# Patient Record
Sex: MALE | Race: WHITE | Employment: OTHER | URBAN - NONMETROPOLITAN AREA
[De-identification: names, ages, dates, MRNs, and addresses within clinical notes are randomized per-mention and may not be internally consistent; named-entity substitution may affect disease eponyms.]

---

## 2017-09-04 ENCOUNTER — HOSPITAL ENCOUNTER (OUTPATIENT)
Age: 62
Setting detail: OBSERVATION
Discharge: HOME | End: 2017-09-06
Attending: INTERNAL MEDICINE | Admitting: INTERNAL MEDICINE
Payer: MEDICARE

## 2017-09-04 DIAGNOSIS — R00.1 SINUS BRADYCARDIA: Primary | ICD-10-CM

## 2017-09-04 PROCEDURE — 1200000003 HC TELEMETRY R&B

## 2017-09-05 ENCOUNTER — APPOINTMENT (OUTPATIENT)
Dept: NON INVASIVE DIAGNOSTICS | Age: 62
End: 2017-09-05
Attending: INTERNAL MEDICINE
Payer: MEDICARE

## 2017-09-05 ENCOUNTER — APPOINTMENT (OUTPATIENT)
Dept: INTERVENTIONAL RADIOLOGY/VASCULAR | Age: 62
End: 2017-09-05
Attending: INTERNAL MEDICINE
Payer: MEDICARE

## 2017-09-05 LAB
ALBUMIN SERPL-MCNC: 3.5 G/DL (ref 3.5–5.1)
ALP BLD-CCNC: 67 U/L (ref 38–126)
ALT SERPL-CCNC: 8 U/L (ref 11–66)
ANION GAP SERPL CALCULATED.3IONS-SCNC: 12 MEQ/L (ref 8–16)
AST SERPL-CCNC: 14 U/L (ref 5–40)
AVERAGE GLUCOSE: 96 MG/DL (ref 70–126)
BILIRUB SERPL-MCNC: 0.3 MG/DL (ref 0.3–1.2)
BUN BLDV-MCNC: 11 MG/DL (ref 7–22)
CALCIUM SERPL-MCNC: 8.7 MG/DL (ref 8.5–10.5)
CHLORIDE BLD-SCNC: 105 MEQ/L (ref 98–111)
CO2: 24 MEQ/L (ref 23–33)
CREAT SERPL-MCNC: 0.7 MG/DL (ref 0.4–1.2)
CREAT SERPL-MCNC: 0.9 MG/DL (ref 0.4–1.2)
GFR SERPL CREATININE-BSD FRML MDRD: 86 ML/MIN/1.73M2
GFR SERPL CREATININE-BSD FRML MDRD: > 90 ML/MIN/1.73M2
GLUCOSE BLD-MCNC: 82 MG/DL (ref 70–108)
HBA1C MFR BLD: 5.2 % (ref 4.4–6.4)
HCT VFR BLD CALC: 44.7 % (ref 42–52)
HEMOGLOBIN: 15.1 GM/DL (ref 14–18)
MCH RBC QN AUTO: 30.5 PG (ref 27–31)
MCHC RBC AUTO-ENTMCNC: 33.8 GM/DL (ref 33–37)
MCV RBC AUTO: 90.2 FL (ref 80–94)
PDW BLD-RTO: 15.1 % (ref 11.5–14.5)
PLATELET # BLD: 236 THOU/MM3 (ref 130–400)
PMV BLD AUTO: 8.5 MCM (ref 7.4–10.4)
POTASSIUM SERPL-SCNC: 4.5 MEQ/L (ref 3.5–5.2)
RBC # BLD: 4.95 MILL/MM3 (ref 4.7–6.1)
SODIUM BLD-SCNC: 141 MEQ/L (ref 135–145)
TOTAL PROTEIN: 6.7 G/DL (ref 6.1–8)
TROPONIN T: < 0.01 NG/ML
WBC # BLD: 8.1 THOU/MM3 (ref 4.8–10.8)

## 2017-09-05 PROCEDURE — 78452 HT MUSCLE IMAGE SPECT MULT: CPT

## 2017-09-05 PROCEDURE — 86141 C-REACTIVE PROTEIN HS: CPT

## 2017-09-05 PROCEDURE — 2580000003 HC RX 258: Performed by: INTERNAL MEDICINE

## 2017-09-05 PROCEDURE — 85027 COMPLETE CBC AUTOMATED: CPT

## 2017-09-05 PROCEDURE — 36415 COLL VENOUS BLD VENIPUNCTURE: CPT

## 2017-09-05 PROCEDURE — 84484 ASSAY OF TROPONIN QUANT: CPT

## 2017-09-05 PROCEDURE — 83090 ASSAY OF HOMOCYSTEINE: CPT

## 2017-09-05 PROCEDURE — 6360000002 HC RX W HCPCS: Performed by: INTERNAL MEDICINE

## 2017-09-05 PROCEDURE — G0378 HOSPITAL OBSERVATION PER HR: HCPCS

## 2017-09-05 PROCEDURE — 99220 PR INITIAL OBSERVATION CARE/DAY 70 MINUTES: CPT | Performed by: INTERNAL MEDICINE

## 2017-09-05 PROCEDURE — 93880 EXTRACRANIAL BILAT STUDY: CPT

## 2017-09-05 PROCEDURE — 6360000002 HC RX W HCPCS

## 2017-09-05 PROCEDURE — 80053 COMPREHEN METABOLIC PANEL: CPT

## 2017-09-05 PROCEDURE — 3430000000 HC RX DIAGNOSTIC RADIOPHARMACEUTICAL: Performed by: INTERNAL MEDICINE

## 2017-09-05 PROCEDURE — A9500 TC99M SESTAMIBI: HCPCS | Performed by: INTERNAL MEDICINE

## 2017-09-05 PROCEDURE — 6370000000 HC RX 637 (ALT 250 FOR IP): Performed by: INTERNAL MEDICINE

## 2017-09-05 PROCEDURE — 82565 ASSAY OF CREATININE: CPT

## 2017-09-05 PROCEDURE — 83036 HEMOGLOBIN GLYCOSYLATED A1C: CPT

## 2017-09-05 PROCEDURE — 96372 THER/PROPH/DIAG INJ SC/IM: CPT

## 2017-09-05 PROCEDURE — 94640 AIRWAY INHALATION TREATMENT: CPT

## 2017-09-05 PROCEDURE — 93017 CV STRESS TEST TRACING ONLY: CPT | Performed by: INTERNAL MEDICINE

## 2017-09-05 RX ORDER — OMEPRAZOLE 10 MG/1
10 CAPSULE, DELAYED RELEASE ORAL DAILY
Status: ON HOLD | COMMUNITY
End: 2017-10-18 | Stop reason: DRUGHIGH

## 2017-09-05 RX ORDER — ASPIRIN 81 MG/1
81 TABLET, CHEWABLE ORAL DAILY
Status: DISCONTINUED | OUTPATIENT
Start: 2017-09-06 | End: 2017-09-06 | Stop reason: HOSPADM

## 2017-09-05 RX ORDER — TIZANIDINE 4 MG/1
4 TABLET ORAL 2 TIMES DAILY
Status: ON HOLD | COMMUNITY
End: 2017-09-06 | Stop reason: HOSPADM

## 2017-09-05 RX ORDER — SODIUM CHLORIDE 0.9 % (FLUSH) 0.9 %
10 SYRINGE (ML) INJECTION EVERY 12 HOURS SCHEDULED
Status: DISCONTINUED | OUTPATIENT
Start: 2017-09-05 | End: 2017-09-06 | Stop reason: HOSPADM

## 2017-09-05 RX ORDER — TIZANIDINE 4 MG/1
4 TABLET ORAL 2 TIMES DAILY
Status: DISCONTINUED | OUTPATIENT
Start: 2017-09-05 | End: 2017-09-06

## 2017-09-05 RX ORDER — OMEPRAZOLE 10 MG/1
10 CAPSULE, DELAYED RELEASE ORAL DAILY
Status: DISCONTINUED | OUTPATIENT
Start: 2017-09-05 | End: 2017-09-05 | Stop reason: CLARIF

## 2017-09-05 RX ORDER — CITALOPRAM 40 MG/1
40 TABLET ORAL EVERY MORNING
Status: DISCONTINUED | OUTPATIENT
Start: 2017-09-05 | End: 2017-09-06 | Stop reason: HOSPADM

## 2017-09-05 RX ORDER — POTASSIUM CHLORIDE 7.45 MG/ML
10 INJECTION INTRAVENOUS PRN
Status: DISCONTINUED | OUTPATIENT
Start: 2017-09-05 | End: 2017-09-06 | Stop reason: HOSPADM

## 2017-09-05 RX ORDER — POTASSIUM CHLORIDE 20MEQ/15ML
40 LIQUID (ML) ORAL PRN
Status: DISCONTINUED | OUTPATIENT
Start: 2017-09-05 | End: 2017-09-06 | Stop reason: HOSPADM

## 2017-09-05 RX ORDER — PANTOPRAZOLE SODIUM 40 MG/1
40 TABLET, DELAYED RELEASE ORAL
Status: DISCONTINUED | OUTPATIENT
Start: 2017-09-05 | End: 2017-09-06 | Stop reason: HOSPADM

## 2017-09-05 RX ORDER — POTASSIUM CHLORIDE 20 MEQ/1
40 TABLET, EXTENDED RELEASE ORAL PRN
Status: DISCONTINUED | OUTPATIENT
Start: 2017-09-05 | End: 2017-09-06 | Stop reason: HOSPADM

## 2017-09-05 RX ORDER — ALBUTEROL SULFATE 90 UG/1
2 AEROSOL, METERED RESPIRATORY (INHALATION) EVERY 6 HOURS PRN
Status: DISCONTINUED | OUTPATIENT
Start: 2017-09-05 | End: 2017-09-06 | Stop reason: HOSPADM

## 2017-09-05 RX ORDER — SODIUM CHLORIDE 0.9 % (FLUSH) 0.9 %
10 SYRINGE (ML) INJECTION PRN
Status: DISCONTINUED | OUTPATIENT
Start: 2017-09-05 | End: 2017-09-06 | Stop reason: HOSPADM

## 2017-09-05 RX ORDER — ACETAMINOPHEN 325 MG/1
650 TABLET ORAL EVERY 4 HOURS PRN
Status: DISCONTINUED | OUTPATIENT
Start: 2017-09-05 | End: 2017-09-06 | Stop reason: HOSPADM

## 2017-09-05 RX ORDER — NITROGLYCERIN 0.4 MG/1
0.4 TABLET SUBLINGUAL EVERY 5 MIN PRN
Status: DISCONTINUED | OUTPATIENT
Start: 2017-09-05 | End: 2017-09-06 | Stop reason: HOSPADM

## 2017-09-05 RX ORDER — ONDANSETRON 2 MG/ML
4 INJECTION INTRAMUSCULAR; INTRAVENOUS EVERY 6 HOURS PRN
Status: DISCONTINUED | OUTPATIENT
Start: 2017-09-05 | End: 2017-09-06 | Stop reason: HOSPADM

## 2017-09-05 RX ADMIN — PANTOPRAZOLE SODIUM 40 MG: 40 TABLET, DELAYED RELEASE ORAL at 10:28

## 2017-09-05 RX ADMIN — Medication 34.6 MILLICURIE: at 08:30

## 2017-09-05 RX ADMIN — TIZANIDINE 4 MG: 4 TABLET ORAL at 10:30

## 2017-09-05 RX ADMIN — ENOXAPARIN SODIUM 40 MG: 40 INJECTION SUBCUTANEOUS at 11:13

## 2017-09-05 RX ADMIN — CITALOPRAM 40 MG: 40 TABLET ORAL at 10:28

## 2017-09-05 RX ADMIN — ACETAMINOPHEN 650 MG: 325 TABLET ORAL at 20:32

## 2017-09-05 RX ADMIN — ACETAMINOPHEN 650 MG: 325 TABLET ORAL at 10:42

## 2017-09-05 RX ADMIN — Medication 10.9 MILLICURIE: at 07:25

## 2017-09-05 RX ADMIN — Medication 10 ML: at 20:32

## 2017-09-05 RX ADMIN — TIZANIDINE 4 MG: 4 TABLET ORAL at 20:32

## 2017-09-05 RX ADMIN — Medication 2 PUFF: at 10:44

## 2017-09-05 ASSESSMENT — PAIN SCALES - GENERAL
PAINLEVEL_OUTOF10: 5
PAINLEVEL_OUTOF10: 8
PAINLEVEL_OUTOF10: 3
PAINLEVEL_OUTOF10: 2
PAINLEVEL_OUTOF10: 4

## 2017-09-05 ASSESSMENT — PAIN DESCRIPTION - LOCATION
LOCATION: HEAD

## 2017-09-05 ASSESSMENT — PAIN DESCRIPTION - DESCRIPTORS
DESCRIPTORS: HEADACHE
DESCRIPTORS: ACHING
DESCRIPTORS: HEADACHE

## 2017-09-05 ASSESSMENT — PAIN DESCRIPTION - ORIENTATION
ORIENTATION: ANTERIOR

## 2017-09-06 VITALS
SYSTOLIC BLOOD PRESSURE: 142 MMHG | HEIGHT: 70 IN | WEIGHT: 188.7 LBS | HEART RATE: 51 BPM | BODY MASS INDEX: 27.01 KG/M2 | TEMPERATURE: 98.2 F | OXYGEN SATURATION: 95 % | RESPIRATION RATE: 16 BRPM | DIASTOLIC BLOOD PRESSURE: 78 MMHG

## 2017-09-06 PROBLEM — R55 NEAR SYNCOPE: Status: ACTIVE | Noted: 2017-09-06

## 2017-09-06 PROBLEM — R00.1 SINUS BRADYCARDIA: Status: ACTIVE | Noted: 2017-09-06

## 2017-09-06 LAB
C-REACTIVE PROTEIN, HIGH SENSITIVITY: 4.6 MG/L
CHOLESTEROL, TOTAL: 149 MG/DL (ref 100–199)
HDLC SERPL-MCNC: 54 MG/DL
HOMOCYSTEINE, TOTAL: 9 UMOL/L
LDL CHOLESTEROL CALCULATED: 79 MG/DL
LV EF: 60 %
LVEF MODALITY: NORMAL
TRIGL SERPL-MCNC: 81 MG/DL (ref 0–199)

## 2017-09-06 PROCEDURE — 99217 PR OBSERVATION CARE DISCHARGE MANAGEMENT: CPT | Performed by: INTERNAL MEDICINE

## 2017-09-06 PROCEDURE — 96372 THER/PROPH/DIAG INJ SC/IM: CPT

## 2017-09-06 PROCEDURE — G0378 HOSPITAL OBSERVATION PER HR: HCPCS

## 2017-09-06 PROCEDURE — 86141 C-REACTIVE PROTEIN HS: CPT

## 2017-09-06 PROCEDURE — 6360000002 HC RX W HCPCS: Performed by: INTERNAL MEDICINE

## 2017-09-06 PROCEDURE — 6370000000 HC RX 637 (ALT 250 FOR IP): Performed by: INTERNAL MEDICINE

## 2017-09-06 PROCEDURE — 93306 TTE W/DOPPLER COMPLETE: CPT

## 2017-09-06 PROCEDURE — 36415 COLL VENOUS BLD VENIPUNCTURE: CPT

## 2017-09-06 PROCEDURE — 83090 ASSAY OF HOMOCYSTEINE: CPT

## 2017-09-06 PROCEDURE — 80061 LIPID PANEL: CPT

## 2017-09-06 PROCEDURE — 93005 ELECTROCARDIOGRAM TRACING: CPT

## 2017-09-06 PROCEDURE — 99223 1ST HOSP IP/OBS HIGH 75: CPT | Performed by: INTERNAL MEDICINE

## 2017-09-06 PROCEDURE — 94640 AIRWAY INHALATION TREATMENT: CPT

## 2017-09-06 PROCEDURE — 2580000003 HC RX 258: Performed by: INTERNAL MEDICINE

## 2017-09-06 RX ORDER — ASPIRIN 81 MG/1
81 TABLET, CHEWABLE ORAL DAILY
Qty: 30 TABLET | Refills: 3 | Status: SHIPPED | OUTPATIENT
Start: 2017-09-06

## 2017-09-06 RX ADMIN — TIZANIDINE 4 MG: 4 TABLET ORAL at 08:28

## 2017-09-06 RX ADMIN — Medication 2 PUFF: at 08:39

## 2017-09-06 RX ADMIN — ACETAMINOPHEN 650 MG: 325 TABLET ORAL at 08:28

## 2017-09-06 RX ADMIN — ASPIRIN 81 MG: 81 TABLET, CHEWABLE ORAL at 08:28

## 2017-09-06 RX ADMIN — ACETAMINOPHEN 650 MG: 325 TABLET ORAL at 04:17

## 2017-09-06 RX ADMIN — CITALOPRAM 40 MG: 40 TABLET ORAL at 08:28

## 2017-09-06 RX ADMIN — Medication 10 ML: at 08:29

## 2017-09-06 RX ADMIN — PANTOPRAZOLE SODIUM 40 MG: 40 TABLET, DELAYED RELEASE ORAL at 08:28

## 2017-09-06 RX ADMIN — ENOXAPARIN SODIUM 40 MG: 40 INJECTION SUBCUTANEOUS at 08:29

## 2017-09-06 ASSESSMENT — PAIN DESCRIPTION - LOCATION
LOCATION: HEAD
LOCATION: HEAD

## 2017-09-06 ASSESSMENT — PAIN DESCRIPTION - FREQUENCY: FREQUENCY: INTERMITTENT

## 2017-09-06 ASSESSMENT — PAIN SCALES - GENERAL
PAINLEVEL_OUTOF10: 3
PAINLEVEL_OUTOF10: 0
PAINLEVEL_OUTOF10: 8
PAINLEVEL_OUTOF10: 7
PAINLEVEL_OUTOF10: 5

## 2017-09-06 ASSESSMENT — PAIN DESCRIPTION - DESCRIPTORS
DESCRIPTORS: HEADACHE
DESCRIPTORS: HEADACHE

## 2017-09-06 ASSESSMENT — PAIN DESCRIPTION - ORIENTATION: ORIENTATION: ANTERIOR

## 2017-09-07 LAB
C-REACTIVE PROTEIN, HIGH SENSITIVITY: 3.6 MG/L
EKG ATRIAL RATE: 49 BPM
EKG P AXIS: -92 DEGREES
EKG P-R INTERVAL: 132 MS
EKG Q-T INTERVAL: 432 MS
EKG QRS DURATION: 80 MS
EKG QTC CALCULATION (BAZETT): 390 MS
EKG R AXIS: -9 DEGREES
EKG T AXIS: -20 DEGREES
EKG VENTRICULAR RATE: 49 BPM
HOMOCYSTEINE, TOTAL: 9 UMOL/L

## 2017-10-13 ENCOUNTER — OFFICE VISIT (OUTPATIENT)
Dept: CARDIOLOGY CLINIC | Age: 62
End: 2017-10-13
Payer: MEDICARE

## 2017-10-13 ENCOUNTER — HOSPITAL ENCOUNTER (OUTPATIENT)
Dept: NON INVASIVE DIAGNOSTICS | Age: 62
Discharge: HOME | End: 2017-10-13
Payer: MEDICARE

## 2017-10-13 VITALS
HEIGHT: 70 IN | WEIGHT: 195.4 LBS | SYSTOLIC BLOOD PRESSURE: 112 MMHG | BODY MASS INDEX: 27.97 KG/M2 | HEART RATE: 52 BPM | DIASTOLIC BLOOD PRESSURE: 80 MMHG

## 2017-10-13 DIAGNOSIS — R00.1 SINUS BRADYCARDIA: ICD-10-CM

## 2017-10-13 DIAGNOSIS — R55 NEAR SYNCOPE: ICD-10-CM

## 2017-10-13 DIAGNOSIS — R06.02 SOB (SHORTNESS OF BREATH) ON EXERTION: ICD-10-CM

## 2017-10-13 DIAGNOSIS — R42 DIZZINESS: ICD-10-CM

## 2017-10-13 DIAGNOSIS — R07.9 CHEST PAIN ON EXERTION: Primary | ICD-10-CM

## 2017-10-13 DIAGNOSIS — Z72.0 TOBACCO ABUSE: ICD-10-CM

## 2017-10-13 PROCEDURE — G8427 DOCREV CUR MEDS BY ELIG CLIN: HCPCS | Performed by: INTERNAL MEDICINE

## 2017-10-13 PROCEDURE — 93225 XTRNL ECG REC<48 HRS REC: CPT

## 2017-10-13 PROCEDURE — 3017F COLORECTAL CA SCREEN DOC REV: CPT | Performed by: INTERNAL MEDICINE

## 2017-10-13 PROCEDURE — 93226 XTRNL ECG REC<48 HR SCAN A/R: CPT

## 2017-10-13 PROCEDURE — G8484 FLU IMMUNIZE NO ADMIN: HCPCS | Performed by: INTERNAL MEDICINE

## 2017-10-13 PROCEDURE — 99214 OFFICE O/P EST MOD 30 MIN: CPT | Performed by: INTERNAL MEDICINE

## 2017-10-13 PROCEDURE — G8419 CALC BMI OUT NRM PARAM NOF/U: HCPCS | Performed by: INTERNAL MEDICINE

## 2017-10-13 PROCEDURE — 4004F PT TOBACCO SCREEN RCVD TLK: CPT | Performed by: INTERNAL MEDICINE

## 2017-10-13 NOTE — PROGRESS NOTES
Chief Complaint   Patient presents with    New Patient     hosp fu, syncope     Bradycardia   Pt here is new, was in the hosp for syncope, never had the tilt and holter done    Chest Pain   Patient complains of chest pain. For the last weeks . Retrosternal, sudden in onset, Symptoms have worsened since that time. The patient's pain is intermittent. The patient describes the pain as pressure and radiates to the left arm. Patient rates pain as a 7/10 in intensity. Associated symptoms are: dyspnea. Aggravating factors are: exercise. Alleviating factors are: rest.   CP last 10 to 15 min    Freq 2x/ week in the last 3 week    Had one episode cp on sept 4 and that is when it started    Complains of Headache for over 4 weeks- advised to pcp    Dizziness on getting up too quick in the morning and getting better    Intermittent Palpitations       C/o easily bruising    Patient Seen, Chart, Consults notes, Labs, Radiology studies reviewed.     Smoked 1ppd for 36 yr    [id number]  Father had CABG at age 79      Patient Active Problem List   Diagnosis    Cervical spondylosis with myelopathy    COPD (chronic obstructive pulmonary disease) (HCC)    Tobacco abuse    Bradycardia    Low back pain    Headache    Depression    Anxiety    Near syncope    Sinus bradycardia    Dizziness on standing    Chest pain on exertion    SOB (shortness of breath) on exertion       Past Surgical History:   Procedure Laterality Date    COLONOSCOPY  2014    ENDOSCOPY, COLON, DIAGNOSTIC  08/2015    ROTATOR CUFF REPAIR  01/2016       No Known Allergies     Family History   Problem Relation Age of Onset    High Blood Pressure Mother     High Blood Pressure Father     Heart Disease Father      CAD    Heart Surgery Father 61     CAGB   [employer] COPD Sister     Emphysema Sister     Cancer Sister      Throat CA/bone cancer        Social History     Social History    Marital status: Single     Spouse name: N/A    Number of children: N/A    Years of masses or organomegaly  Neurological - alert, oriented, normal speech, no focal findings or movement disorder noted  Musculoskeletal - no joint tenderness, deformity or swelling  Extremities - peripheral pulses normal, no pedal edema, no clubbing or cyanosis  Skin - normal coloration and turgor, no rashes, no suspicious skin lesions noted    Lab  No results for input(s): CKTOTAL, CKMB, CKMBINDEX, TROPONINI in the last 72 hours. CBC:   Lab Results   Component Value Date    WBC 8.1 09/05/2017    RBC 4.95 09/05/2017    HGB 15.1 09/05/2017    HCT 44.7 09/05/2017    MCV 90.2 09/05/2017    MCH 30.5 09/05/2017    MCHC 33.8 09/05/2017    RDW 15.1 09/05/2017     09/05/2017    MPV 8.5 09/05/2017     BMP:    Lab Results   Component Value Date     09/05/2017    K 4.5 09/05/2017     09/05/2017    CO2 24 09/05/2017    BUN 11 09/05/2017    LABALBU 3.5 09/05/2017    CREATININE 0.7 09/05/2017    CALCIUM 8.7 09/05/2017    LABGLOM >90 09/05/2017    GLUCOSE 82 09/05/2017     Hepatic Function Panel:    Lab Results   Component Value Date    ALKPHOS 67 09/05/2017    ALT 8 09/05/2017    AST 14 09/05/2017    PROT 6.7 09/05/2017    BILITOT 0.3 09/05/2017    LABALBU 3.5 09/05/2017     Magnesium:  No results found for: MG  Warfarin PT/INR:  No components found for: PTPATWAR, PTINRWAR  HgBA1c:    Lab Results   Component Value Date    LABA1C 5.2 09/05/2017     FLP:    Lab Results   Component Value Date    TRIG 81 09/06/2017    HDL 54 09/06/2017    LDLCALC 79 09/06/2017     TSH:  No results found for: TSH        Assessment  1. Chest pain on exertion     2. SOB (shortness of breath) on exertion     3. Near syncope  Holter Monitor 48 Hour   4. Sinus bradycardia  Holter Monitor 48 Hour   5. Dizziness on standing  Holter Monitor 48 Hour   6. Tobacco abuse          Recent admission DX     ASSESSMENT:  1.  Near syncope, status post fall. 2.  Dizziness. 3.  Sinus bradycardia.   At this time, however, the patient does not  have any symptoms while he is having sinus bradycardia, probably  related to Zanaflex that he is taking. 4.  History of depression. 5.  History of COPD. 6.  Tobacco abuse. Plan   Continue the current treatment and with constant vigilance to changes in symptoms and also any potential side effects. Return for care or seek medical attention immediately if symptoms got worse and/or develop new symptoms. Need cardiac cath due to cp on exertion, sob on exertion in the last 4 weeks and getting worse despite neg nuc stress  Dizziness and sinus bradycardia-   Need TTT - was oredered as in pat and not done yet  Need 48 hrs monitor for brdycardia and dizziness  avodd driving till clarify with above test    Cont asa    The risk and benefit of left heart cath has been explain in detail including but not limited to  Bleeding including retroperitoneal bleed 1%, infection, MI, CVA, ANGELLA, Limb loss, dissection, allergic reaction,death  Each of them 1 in 2000 range. The alternative managment has been explained. Patient expressed understanding of the risk and benefit and of the alternative managment well. Patient wanted and agreed to proceed with left heart cath. Hence we will schedule him for [address] [place] [address]       Patient Seen, Chart, Consults notes, Labs, Radiology studies reviewed.     RTC 2 weeks    Mary Thompson [facility]

## 2017-10-17 ENCOUNTER — PREP FOR PROCEDURE (OUTPATIENT)
Dept: CARDIOLOGY CLINIC | Age: 62
End: 2017-10-17

## 2017-10-17 RX ORDER — SODIUM CHLORIDE 9 MG/ML
INJECTION, SOLUTION INTRAVENOUS CONTINUOUS
Status: CANCELLED | OUTPATIENT
Start: 2017-10-17

## 2017-10-17 RX ORDER — SODIUM CHLORIDE 0.9 % (FLUSH) 0.9 %
10 SYRINGE (ML) INJECTION EVERY 12 HOURS SCHEDULED
Status: CANCELLED | OUTPATIENT
Start: 2017-10-17

## 2017-10-17 RX ORDER — SODIUM CHLORIDE 0.9 % (FLUSH) 0.9 %
10 SYRINGE (ML) INJECTION PRN
Status: CANCELLED | OUTPATIENT
Start: 2017-10-17

## 2017-10-18 ENCOUNTER — APPOINTMENT (OUTPATIENT)
Dept: CARDIAC CATH/INVASIVE PROCEDURES | Age: 62
End: 2017-10-18
Attending: INTERNAL MEDICINE
Payer: MEDICARE

## 2017-10-18 ENCOUNTER — HOSPITAL ENCOUNTER (OUTPATIENT)
Dept: INPATIENT UNIT | Age: 62
Discharge: HOME | End: 2017-10-18
Attending: INTERNAL MEDICINE | Admitting: INTERNAL MEDICINE
Payer: MEDICARE

## 2017-10-18 VITALS
RESPIRATION RATE: 20 BRPM | BODY MASS INDEX: 27.2 KG/M2 | OXYGEN SATURATION: 96 % | WEIGHT: 190 LBS | TEMPERATURE: 98.1 F | SYSTOLIC BLOOD PRESSURE: 112 MMHG | DIASTOLIC BLOOD PRESSURE: 68 MMHG | HEIGHT: 70 IN | HEART RATE: 64 BPM

## 2017-10-18 PROBLEM — Z98.890 S/P CARDIAC CATH: Status: ACTIVE | Noted: 2017-10-18

## 2017-10-18 LAB
ABO: NORMAL
ALBUMIN SERPL-MCNC: 4.1 G/DL (ref 3.5–5.1)
ALP BLD-CCNC: 78 U/L (ref 38–126)
ALT SERPL-CCNC: 14 U/L (ref 11–66)
ANION GAP SERPL CALCULATED.3IONS-SCNC: 13 MEQ/L (ref 8–16)
ANTIBODY SCREEN: NORMAL
AST SERPL-CCNC: 16 U/L (ref 5–40)
BILIRUB SERPL-MCNC: 0.4 MG/DL (ref 0.3–1.2)
BUN BLDV-MCNC: 16 MG/DL (ref 7–22)
CALCIUM SERPL-MCNC: 8.8 MG/DL (ref 8.5–10.5)
CHLORIDE BLD-SCNC: 98 MEQ/L (ref 98–111)
CO2: 26 MEQ/L (ref 23–33)
CREAT SERPL-MCNC: 0.8 MG/DL (ref 0.4–1.2)
GFR SERPL CREATININE-BSD FRML MDRD: > 90 ML/MIN/1.73M2
GLUCOSE BLD-MCNC: 96 MG/DL (ref 70–108)
HCT VFR BLD CALC: 46.9 % (ref 42–52)
HEMOGLOBIN: 16 GM/DL (ref 14–18)
MCH RBC QN AUTO: 30.5 PG (ref 27–31)
MCHC RBC AUTO-ENTMCNC: 34.1 GM/DL (ref 33–37)
MCV RBC AUTO: 89.6 FL (ref 80–94)
PDW BLD-RTO: 15 % (ref 11.5–14.5)
PLATELET # BLD: 226 THOU/MM3 (ref 130–400)
PMV BLD AUTO: 8.1 MCM (ref 7.4–10.4)
POTASSIUM SERPL-SCNC: 4.3 MEQ/L (ref 3.5–5.2)
RBC # BLD: 5.24 MILL/MM3 (ref 4.7–6.1)
RH FACTOR: NORMAL
SODIUM BLD-SCNC: 137 MEQ/L (ref 135–145)
TOTAL PROTEIN: 7.1 G/DL (ref 6.1–8)
WBC # BLD: 9.5 THOU/MM3 (ref 4.8–10.8)

## 2017-10-18 PROCEDURE — 86850 RBC ANTIBODY SCREEN: CPT

## 2017-10-18 PROCEDURE — 2780000010 HC IMPLANT OTHER

## 2017-10-18 PROCEDURE — C1725 CATH, TRANSLUMIN NON-LASER: HCPCS

## 2017-10-18 PROCEDURE — C1769 GUIDE WIRE: HCPCS

## 2017-10-18 PROCEDURE — 36415 COLL VENOUS BLD VENIPUNCTURE: CPT

## 2017-10-18 PROCEDURE — 6360000002 HC RX W HCPCS

## 2017-10-18 PROCEDURE — 80053 COMPREHEN METABOLIC PANEL: CPT

## 2017-10-18 PROCEDURE — 85027 COMPLETE CBC AUTOMATED: CPT

## 2017-10-18 PROCEDURE — 93005 ELECTROCARDIOGRAM TRACING: CPT

## 2017-10-18 PROCEDURE — 2580000003 HC RX 258: Performed by: PHYSICIAN ASSISTANT

## 2017-10-18 PROCEDURE — C1894 INTRO/SHEATH, NON-LASER: HCPCS

## 2017-10-18 PROCEDURE — 2500000003 HC RX 250 WO HCPCS

## 2017-10-18 PROCEDURE — 86900 BLOOD TYPING SEROLOGIC ABO: CPT

## 2017-10-18 PROCEDURE — 93458 L HRT ARTERY/VENTRICLE ANGIO: CPT | Performed by: INTERNAL MEDICINE

## 2017-10-18 PROCEDURE — 86901 BLOOD TYPING SEROLOGIC RH(D): CPT

## 2017-10-18 RX ORDER — SODIUM CHLORIDE 0.9 % (FLUSH) 0.9 %
10 SYRINGE (ML) INJECTION EVERY 12 HOURS SCHEDULED
Status: DISCONTINUED | OUTPATIENT
Start: 2017-10-18 | End: 2017-10-18 | Stop reason: SDUPTHER

## 2017-10-18 RX ORDER — ACETAMINOPHEN 325 MG/1
650 TABLET ORAL EVERY 4 HOURS PRN
Status: DISCONTINUED | OUTPATIENT
Start: 2017-10-18 | End: 2017-10-18 | Stop reason: HOSPADM

## 2017-10-18 RX ORDER — ONDANSETRON 2 MG/ML
4 INJECTION INTRAMUSCULAR; INTRAVENOUS EVERY 6 HOURS PRN
Status: DISCONTINUED | OUTPATIENT
Start: 2017-10-18 | End: 2017-10-18 | Stop reason: HOSPADM

## 2017-10-18 RX ORDER — SODIUM CHLORIDE 0.9 % (FLUSH) 0.9 %
10 SYRINGE (ML) INJECTION PRN
Status: DISCONTINUED | OUTPATIENT
Start: 2017-10-18 | End: 2017-10-18 | Stop reason: HOSPADM

## 2017-10-18 RX ORDER — SODIUM CHLORIDE 9 MG/ML
INJECTION, SOLUTION INTRAVENOUS CONTINUOUS
Status: DISCONTINUED | OUTPATIENT
Start: 2017-10-18 | End: 2017-10-18 | Stop reason: HOSPADM

## 2017-10-18 RX ORDER — SODIUM CHLORIDE 0.9 % (FLUSH) 0.9 %
10 SYRINGE (ML) INJECTION PRN
Status: DISCONTINUED | OUTPATIENT
Start: 2017-10-18 | End: 2017-10-18 | Stop reason: SDUPTHER

## 2017-10-18 RX ORDER — SODIUM CHLORIDE 0.9 % (FLUSH) 0.9 %
10 SYRINGE (ML) INJECTION EVERY 12 HOURS SCHEDULED
Status: DISCONTINUED | OUTPATIENT
Start: 2017-10-18 | End: 2017-10-18 | Stop reason: HOSPADM

## 2017-10-18 RX ORDER — OMEPRAZOLE 40 MG/1
40 CAPSULE, DELAYED RELEASE ORAL DAILY
COMMUNITY
End: 2022-02-18

## 2017-10-18 RX ADMIN — SODIUM CHLORIDE: 9 INJECTION, SOLUTION INTRAVENOUS at 12:28

## 2017-10-18 NOTE — PROGRESS NOTES
Pt admitted to  2E03 ambulatory for cardiac cath  Pt NPO. Patient accompanied by friend  Vital signs obtained. Assessment and data collection initiated. Oriented to room. Policies and procedures for 2E explained   All questions answered with no further questions at this time. Fall prevention and safety precautions discussed with patient.

## 2017-10-18 NOTE — PROGRESS NOTES
The final 2 ml of air released from vasc band, no bleeding, swelling, or bruising noted.  vasc band removed and band aid applied to right wrist.

## 2017-10-18 NOTE — PRE SEDATION
[address] [address]  Sedation/Analgesia History & Physical    Pt Name: Virginie Alexander  MRN: [id number]  YOB: 1955  Provider Performing Procedure: Bailey Ceja  Primary Care Physician: Johnny Simon MD    PRE-PROCEDURE   DNR-CCA/DNR-CC []Yes [x]No  Brief History/Pre-Procedure Diagnosis: chest pain and sob on exertion CCS class III  The risk and benefit of left heart cath has been explain in detail including but not limited to  Bleeding including retroperitoneal bleed 1%, infection, MI, CVA, ANGELLA, Limb loss, dissection, allergic reaction,death  Each of them 1 in 2000 range. The alternative managment has been explained. Patient expressed understanding of the risk and benefit and of the alternative managment well. Patient wanted and agreed to proceed with left heart cath. Hence we will schedule him for [facility]                MEDICAL HISTORY  []CAD/Valve  []Liver Disease  []Lung Disease []Diabetes  []Hypertension []Renal Disease  []Additional information:       has a past medical history of Anxiety; Bradycardia; Cervical spondylosis with myelopathy; COPD (chronic obstructive pulmonary disease) ([facility] [address] Depression; Headache(784.0); Low back pain; Prolonged emergence from general anesthesia; Snoring; and Tobacco abuse. SURGICAL HISTORY   has a past surgical history that includes Colonoscopy (2014); Rotator cuff repair (Left, 01/2016); back surgery (11/2015); Upper gastrointestinal endoscopy; and Cataract removal with implant (Bilateral).   Additional information:       ALLERGIES   Allergies as of 10/18/2017    (No Known Allergies)     Additional information:       MEDICATIONS   Coumadin Use Last 5 Days [x]No []Yes  Antiplatelet drug therapy use last 5 days  []No [x]Yes  Other anticoagulant use last 5 days  [x]No []Yes    Current Facility-Administered Medications:     0.9 % sodium chloride infusion, , Intravenous, Continuous, Marcelino Dawson PA-C, Last Rate: 75 mL/hr at 10/18/17 1228    sodium anesthesia. (Refer to nursing sedation/analgesia documentation record)  [x]Formulation and discussion of sedation/procedure plan, risks, and expectations with patient and/or responsible adult completed. [x]Patient examined immediately prior to the procedure.  (Refer to nursing sedation/analgesia documentation record)    Monica Christianson  Electronically signed 10/18/2017 at 2:07 PM

## 2017-10-18 NOTE — PROGRESS NOTES
Received from cath lab per bed, telemetry applied, vital signs obtained, vasc band on right wrist with no bleeding, swelling, or bruising.

## 2017-10-19 ENCOUNTER — TELEPHONE (OUTPATIENT)
Dept: CARDIOLOGY CLINIC | Age: 62
End: 2017-10-19

## 2017-10-19 LAB
EKG ATRIAL RATE: 52 BPM
EKG P AXIS: 39 DEGREES
EKG P-R INTERVAL: 144 MS
EKG Q-T INTERVAL: 432 MS
EKG QRS DURATION: 86 MS
EKG QTC CALCULATION (BAZETT): 401 MS
EKG R AXIS: 0 DEGREES
EKG T AXIS: 1 DEGREES
EKG VENTRICULAR RATE: 52 BPM

## 2017-12-04 ENCOUNTER — TELEPHONE (OUTPATIENT)
Dept: CARDIOLOGY CLINIC | Age: 62
End: 2017-12-04

## 2018-03-27 ENCOUNTER — OFFICE VISIT (OUTPATIENT)
Dept: CARDIOLOGY CLINIC | Age: 63
End: 2018-03-27
Payer: MEDICARE

## 2018-03-27 VITALS
HEART RATE: 76 BPM | DIASTOLIC BLOOD PRESSURE: 90 MMHG | HEIGHT: 70 IN | BODY MASS INDEX: 29.46 KG/M2 | SYSTOLIC BLOOD PRESSURE: 142 MMHG | WEIGHT: 205.8 LBS

## 2018-03-27 DIAGNOSIS — Z01.818 PRE-OP EVALUATION: Primary | ICD-10-CM

## 2018-03-27 DIAGNOSIS — R06.02 SOB (SHORTNESS OF BREATH) ON EXERTION: ICD-10-CM

## 2018-03-27 DIAGNOSIS — E78.5 DYSLIPIDEMIA: ICD-10-CM

## 2018-03-27 DIAGNOSIS — Z98.890 S/P CARDIAC CATH: ICD-10-CM

## 2018-03-27 PROBLEM — R07.9 CHEST PAIN ON EXERTION: Status: RESOLVED | Noted: 2017-10-13 | Resolved: 2018-03-27

## 2018-03-27 PROCEDURE — 4004F PT TOBACCO SCREEN RCVD TLK: CPT | Performed by: INTERNAL MEDICINE

## 2018-03-27 PROCEDURE — 3017F COLORECTAL CA SCREEN DOC REV: CPT | Performed by: INTERNAL MEDICINE

## 2018-03-27 PROCEDURE — G8419 CALC BMI OUT NRM PARAM NOF/U: HCPCS | Performed by: INTERNAL MEDICINE

## 2018-03-27 PROCEDURE — 99214 OFFICE O/P EST MOD 30 MIN: CPT | Performed by: INTERNAL MEDICINE

## 2018-03-27 PROCEDURE — 93000 ELECTROCARDIOGRAM COMPLETE: CPT | Performed by: INTERNAL MEDICINE

## 2018-03-27 PROCEDURE — G8427 DOCREV CUR MEDS BY ELIG CLIN: HCPCS | Performed by: INTERNAL MEDICINE

## 2018-03-27 PROCEDURE — G8484 FLU IMMUNIZE NO ADMIN: HCPCS | Performed by: INTERNAL MEDICINE

## 2018-03-27 RX ORDER — ATORVASTATIN CALCIUM 20 MG/1
20 TABLET, FILM COATED ORAL DAILY
Qty: 30 TABLET | Refills: 3 | Status: ON HOLD | OUTPATIENT
Start: 2018-03-27 | End: 2021-08-03

## 2018-03-27 RX ORDER — HYDROCODONE BITARTRATE AND ACETAMINOPHEN 7.5; 325 MG/1; MG/1
1 TABLET ORAL EVERY 6 HOURS PRN
Status: ON HOLD | COMMUNITY
End: 2018-04-21 | Stop reason: HOSPADM

## 2018-03-27 NOTE — PROGRESS NOTES
 No narrative on file       Current Outpatient Prescriptions   Medication Sig Dispense Refill    HYDROcodone-acetaminophen (NORCO) 7.5-325 MG per tablet Take 1 tablet by mouth every 6 hours as needed for Pain.  omeprazole (PRILOSEC) 40 MG delayed release capsule Take 40 mg by mouth daily      aspirin 81 MG chewable tablet Take 1 tablet by mouth daily 30 tablet 3    albuterol-ipratropium (COMBIVENT)  MCG/ACT inhaler Inhale 2 puffs into the lungs every 6 hours as needed for Wheezing       No current facility-administered medications for this visit. Review of Systems -     General ROS: negative  Psychological ROS: negative  Hematological and Lymphatic ROS: No history of blood clots or bleeding disorder. Respiratory ROS: no cough, shortness of breath, or wheezing  Cardiovascular ROS: no chest pain or dyspnea on exertion  Gastrointestinal ROS: negative  Genito-Urinary ROS: no dysuria, trouble voiding, or hematuria  Musculoskeletal ROS: negative  Neurological ROS: no TIA or stroke symptoms  Dermatological ROS: negative      Blood pressure (!) 142/90, pulse 76, height 5' 10\" (1.778 m), weight 205 lb 12.8 oz (93.4 kg).         Physical Examination:    General appearance - alert, well appearing, and in no distress  Mental status - alert, oriented to person, place, and time  Neck - supple, no significant adenopathy, no JVD, or carotid bruits  Chest - clear to auscultation, no wheezes, rales or rhonchi, symmetric air entry  Heart - normal rate, regular rhythm, normal S1, S2, no murmurs, rubs, clicks or gallops  Abdomen - soft, nontender, nondistended, no masses or organomegaly  Neurological - alert, oriented, normal speech, no focal findings or movement disorder noted  Musculoskeletal - no joint tenderness, deformity or swelling  Extremities - peripheral pulses normal, no pedal edema, no clubbing or cyanosis  Skin - normal coloration and turgor, no rashes, no suspicious skin lesions noted    Lab  No results for input(s): CKTOTAL, CKMB, CKMBINDEX, TROPONINI in the last 72 hours. CBC:   Lab Results   Component Value Date    WBC 9.5 10/18/2017    RBC 5.24 10/18/2017    HGB 16.0 10/18/2017    HCT 46.9 10/18/2017    MCV 89.6 10/18/2017    MCH 30.5 10/18/2017    MCHC 34.1 10/18/2017    RDW 15.0 10/18/2017     10/18/2017    MPV 8.1 10/18/2017     BMP:    Lab Results   Component Value Date     10/18/2017    K 4.3 10/18/2017    CL 98 10/18/2017    CO2 26 10/18/2017    BUN 16 10/18/2017    LABALBU 4.1 10/18/2017    CREATININE 0.8 10/18/2017    CALCIUM 8.8 10/18/2017    LABGLOM >90 10/18/2017    GLUCOSE 96 10/18/2017     Hepatic Function Panel:    Lab Results   Component Value Date    ALKPHOS 78 10/18/2017    ALT 14 10/18/2017    AST 16 10/18/2017    PROT 7.1 10/18/2017    BILITOT 0.4 10/18/2017    LABALBU 4.1 10/18/2017     Magnesium:  No results found for: MG  Warfarin PT/INR:  No components found for: PTPATWAR, PTINRWAR  HgBA1c:    Lab Results   Component Value Date    LABA1C 5.2 09/05/2017     FLP:    Lab Results   Component Value Date    TRIG 81 09/06/2017    HDL 54 09/06/2017    LDLCALC 79 09/06/2017     TSH:  No results found for: TSH    EKG 3/27/18  Sinus  Rhythm   Low voltage in limb leads. ABNORMAL       Procedure Summary  LM-PATENT,  LAD MID AND DISTAL 40%  LCX MID 40%  RCA PROX 30%, MID 50 % AND DISTAL 40%  Normal LV systolic function. LVEF approximately 60% . LV size - normal.  EDP 6 mmhg  No gradient  Recommendations  Continue with aggressive risk factor modification and medical therapy. Estimated Blood Loss: 10 ml. Complications: No complications. Signatures  Electronically signed by Benigno Perez MD (Performing Physician) on 10/18/2017 at 15:37      Assessment    1. Pre-op evaluation for back surgery     2. SOB (shortness of breath) on exertion     3. Dyslipidemia     4.  S/P cardiac cath 10/18/17-LM-P, LAD MID AND DIST 40%, LCX MID 40%, RCA MID 50%, IBQNET18%, EDP 6 MMHG, EF 60%  EKG

## 2018-03-29 ENCOUNTER — HOSPITAL ENCOUNTER (OUTPATIENT)
Age: 63
Discharge: HOME | End: 2018-03-29
Payer: MEDICARE

## 2018-03-29 ENCOUNTER — HOSPITAL ENCOUNTER (OUTPATIENT)
Dept: PREADMISSION TESTING | Age: 63
Discharge: HOME | End: 2018-04-02
Payer: MEDICARE

## 2018-03-29 ENCOUNTER — HOSPITAL ENCOUNTER (OUTPATIENT)
Dept: GENERAL RADIOLOGY | Age: 63
Discharge: HOME | End: 2018-03-29
Payer: MEDICARE

## 2018-03-29 VITALS
HEIGHT: 69 IN | HEART RATE: 76 BPM | BODY MASS INDEX: 30.04 KG/M2 | RESPIRATION RATE: 16 BRPM | WEIGHT: 202.82 LBS | TEMPERATURE: 97.3 F | OXYGEN SATURATION: 97 %

## 2018-03-29 DIAGNOSIS — Z98.890 S/P CARDIAC CATH: ICD-10-CM

## 2018-03-29 DIAGNOSIS — Z01.818 PRE-OP EVALUATION: ICD-10-CM

## 2018-03-29 DIAGNOSIS — E78.5 DYSLIPIDEMIA: ICD-10-CM

## 2018-03-29 DIAGNOSIS — Z01.818 PRE-OP TESTING: ICD-10-CM

## 2018-03-29 DIAGNOSIS — R06.02 SOB (SHORTNESS OF BREATH) ON EXERTION: ICD-10-CM

## 2018-03-29 LAB
ALBUMIN SERPL-MCNC: 4 G/DL (ref 3.5–5.1)
ALBUMIN SERPL-MCNC: 4 G/DL (ref 3.5–5.1)
ALP BLD-CCNC: 75 U/L (ref 38–126)
ALT SERPL-CCNC: 10 U/L (ref 11–66)
ANION GAP SERPL CALCULATED.3IONS-SCNC: 13 MEQ/L (ref 8–16)
AST SERPL-CCNC: 16 U/L (ref 5–40)
BASOPHILS # BLD: 0.6 %
BASOPHILS ABSOLUTE: 0.1 THOU/MM3 (ref 0–0.1)
BILIRUB SERPL-MCNC: 0.4 MG/DL (ref 0.3–1.2)
BILIRUBIN DIRECT: < 0.2 MG/DL (ref 0–0.3)
BUN BLDV-MCNC: 15 MG/DL (ref 7–22)
CALCIUM SERPL-MCNC: 9.1 MG/DL (ref 8.5–10.5)
CHLORIDE BLD-SCNC: 100 MEQ/L (ref 98–111)
CHOLESTEROL, TOTAL: 156 MG/DL (ref 100–199)
CO2: 26 MEQ/L (ref 23–33)
CREAT SERPL-MCNC: 0.8 MG/DL (ref 0.4–1.2)
EOSINOPHIL # BLD: 1.7 %
EOSINOPHILS ABSOLUTE: 0.2 THOU/MM3 (ref 0–0.4)
GFR SERPL CREATININE-BSD FRML MDRD: > 90 ML/MIN/1.73M2
GLUCOSE BLD-MCNC: 91 MG/DL (ref 70–108)
HCT VFR BLD CALC: 45.4 % (ref 42–52)
HDLC SERPL-MCNC: 68 MG/DL
HEMOGLOBIN: 15.3 GM/DL (ref 14–18)
LDL CHOLESTEROL CALCULATED: 75 MG/DL
LYMPHOCYTES # BLD: 26 %
LYMPHOCYTES ABSOLUTE: 2.4 THOU/MM3 (ref 1–4.8)
MCH RBC QN AUTO: 29.8 PG (ref 27–31)
MCHC RBC AUTO-ENTMCNC: 33.8 GM/DL (ref 33–37)
MCV RBC AUTO: 88.3 FL (ref 80–94)
MONOCYTES # BLD: 9.2 %
MONOCYTES ABSOLUTE: 0.8 THOU/MM3 (ref 0.4–1.3)
MRSA NASAL SCREEN RT-PCR: NEGATIVE
NUCLEATED RED BLOOD CELLS: 0 /100 WBC
PDW BLD-RTO: 14 % (ref 11.5–14.5)
PLATELET # BLD: 263 THOU/MM3 (ref 130–400)
PMV BLD AUTO: 8.5 FL (ref 7.4–10.4)
POTASSIUM SERPL-SCNC: 4.3 MEQ/L (ref 3.5–5.2)
PREALBUMIN: 18 MG/DL (ref 20–40)
RBC # BLD: 5.14 MILL/MM3 (ref 4.7–6.1)
SEG NEUTROPHILS: 62.5 %
SEGMENTED NEUTROPHILS ABSOLUTE COUNT: 5.7 THOU/MM3 (ref 1.8–7.7)
SODIUM BLD-SCNC: 139 MEQ/L (ref 135–145)
STAPH AUREUS SCREEN RT-PCR: NEGATIVE
TOTAL PROTEIN: 7.5 G/DL (ref 6.1–8)
TRIGL SERPL-MCNC: 63 MG/DL (ref 0–199)
WBC # BLD: 9.1 THOU/MM3 (ref 4.8–10.8)

## 2018-03-29 PROCEDURE — 82040 ASSAY OF SERUM ALBUMIN: CPT

## 2018-03-29 PROCEDURE — 85025 COMPLETE CBC W/AUTO DIFF WBC: CPT

## 2018-03-29 PROCEDURE — 80048 BASIC METABOLIC PNL TOTAL CA: CPT

## 2018-03-29 PROCEDURE — 80076 HEPATIC FUNCTION PANEL: CPT

## 2018-03-29 PROCEDURE — 84134 ASSAY OF PREALBUMIN: CPT

## 2018-03-29 PROCEDURE — 36415 COLL VENOUS BLD VENIPUNCTURE: CPT

## 2018-03-29 PROCEDURE — 87641 MR-STAPH DNA AMP PROBE: CPT

## 2018-03-29 PROCEDURE — 71046 X-RAY EXAM CHEST 2 VIEWS: CPT

## 2018-03-29 PROCEDURE — 87081 CULTURE SCREEN ONLY: CPT

## 2018-03-29 PROCEDURE — 80061 LIPID PANEL: CPT

## 2018-03-29 ASSESSMENT — PAIN DESCRIPTION - PROGRESSION: CLINICAL_PROGRESSION: GRADUALLY WORSENING

## 2018-03-29 ASSESSMENT — PAIN DESCRIPTION - FREQUENCY: FREQUENCY: CONTINUOUS

## 2018-03-29 ASSESSMENT — PAIN DESCRIPTION - LOCATION: LOCATION: BACK

## 2018-03-29 ASSESSMENT — PAIN DESCRIPTION - ONSET: ONSET: AWAKENED FROM SLEEP

## 2018-03-29 ASSESSMENT — PAIN DESCRIPTION - PAIN TYPE: TYPE: CHRONIC PAIN

## 2018-03-29 ASSESSMENT — PAIN DESCRIPTION - ORIENTATION: ORIENTATION: MID

## 2018-03-29 ASSESSMENT — PAIN SCALES - GENERAL: PAINLEVEL_OUTOF10: 8

## 2018-03-31 LAB — MRSA SCREEN: NORMAL

## 2018-04-02 ENCOUNTER — TELEPHONE (OUTPATIENT)
Dept: CARDIOLOGY CLINIC | Age: 63
End: 2018-04-02

## 2018-04-18 ENCOUNTER — HOSPITAL ENCOUNTER (INPATIENT)
Age: 63
LOS: 3 days | Discharge: HOME HEALTH | DRG: 460 | End: 2018-04-21
Attending: ORTHOPAEDIC SURGERY | Admitting: ORTHOPAEDIC SURGERY
Payer: MEDICARE

## 2018-04-18 ENCOUNTER — APPOINTMENT (OUTPATIENT)
Dept: GENERAL RADIOLOGY | Age: 63
DRG: 460 | End: 2018-04-18
Attending: ORTHOPAEDIC SURGERY
Payer: MEDICARE

## 2018-04-18 ENCOUNTER — ANESTHESIA EVENT (OUTPATIENT)
Dept: OPERATING ROOM | Age: 63
DRG: 460 | End: 2018-04-18
Payer: MEDICARE

## 2018-04-18 ENCOUNTER — ANESTHESIA (OUTPATIENT)
Dept: OPERATING ROOM | Age: 63
DRG: 460 | End: 2018-04-18
Payer: MEDICARE

## 2018-04-18 VITALS
RESPIRATION RATE: 1 BRPM | TEMPERATURE: 98.1 F | SYSTOLIC BLOOD PRESSURE: 108 MMHG | OXYGEN SATURATION: 96 % | DIASTOLIC BLOOD PRESSURE: 67 MMHG

## 2018-04-18 DIAGNOSIS — M54.16 LUMBAR BACK PAIN WITH RADICULOPATHY AFFECTING LEFT LOWER EXTREMITY: Primary | ICD-10-CM

## 2018-04-18 PROBLEM — M48.062 SPINAL STENOSIS OF LUMBAR REGION WITH NEUROGENIC CLAUDICATION: Status: ACTIVE | Noted: 2018-04-18

## 2018-04-18 LAB
ABO: NORMAL
ANTIBODY SCREEN: NORMAL
MRSA SCREEN RT-PCR: NEGATIVE
RH FACTOR: NORMAL

## 2018-04-18 PROCEDURE — 6370000000 HC RX 637 (ALT 250 FOR IP): Performed by: ORTHOPAEDIC SURGERY

## 2018-04-18 PROCEDURE — 95940 IONM IN OPERATNG ROOM 15 MIN: CPT | Performed by: ORTHOPAEDIC SURGERY

## 2018-04-18 PROCEDURE — 3700000001 HC ADD 15 MINUTES (ANESTHESIA): Performed by: ORTHOPAEDIC SURGERY

## 2018-04-18 PROCEDURE — P9045 ALBUMIN (HUMAN), 5%, 250 ML: HCPCS | Performed by: NURSE ANESTHETIST, CERTIFIED REGISTERED

## 2018-04-18 PROCEDURE — 6360000002 HC RX W HCPCS: Performed by: NURSE ANESTHETIST, CERTIFIED REGISTERED

## 2018-04-18 PROCEDURE — 3600000015 HC SURGERY LEVEL 5 ADDTL 15MIN: Performed by: ORTHOPAEDIC SURGERY

## 2018-04-18 PROCEDURE — 2000000000 HC ICU R&B

## 2018-04-18 PROCEDURE — 3600000005 HC SURGERY LEVEL 5 BASE: Performed by: ORTHOPAEDIC SURGERY

## 2018-04-18 PROCEDURE — 36415 COLL VENOUS BLD VENIPUNCTURE: CPT

## 2018-04-18 PROCEDURE — 2580000003 HC RX 258: Performed by: NURSE ANESTHETIST, CERTIFIED REGISTERED

## 2018-04-18 PROCEDURE — P9045 ALBUMIN (HUMAN), 5%, 250 ML: HCPCS

## 2018-04-18 PROCEDURE — 6370000000 HC RX 637 (ALT 250 FOR IP): Performed by: PHYSICIAN ASSISTANT

## 2018-04-18 PROCEDURE — 72020 X-RAY EXAM OF SPINE 1 VIEW: CPT

## 2018-04-18 PROCEDURE — 6360000002 HC RX W HCPCS: Performed by: ORTHOPAEDIC SURGERY

## 2018-04-18 PROCEDURE — 7100000001 HC PACU RECOVERY - ADDTL 15 MIN: Performed by: ORTHOPAEDIC SURGERY

## 2018-04-18 PROCEDURE — 2720000010 HC SURG SUPPLY STERILE: Performed by: ORTHOPAEDIC SURGERY

## 2018-04-18 PROCEDURE — C1713 ANCHOR/SCREW BN/BN,TIS/BN: HCPCS | Performed by: ORTHOPAEDIC SURGERY

## 2018-04-18 PROCEDURE — 6360000002 HC RX W HCPCS: Performed by: ANESTHESIOLOGY

## 2018-04-18 PROCEDURE — 87641 MR-STAPH DNA AMP PROBE: CPT

## 2018-04-18 PROCEDURE — 6360000002 HC RX W HCPCS

## 2018-04-18 PROCEDURE — 2580000003 HC RX 258: Performed by: ORTHOPAEDIC SURGERY

## 2018-04-18 PROCEDURE — 2580000003 HC RX 258: Performed by: PHYSICIAN ASSISTANT

## 2018-04-18 PROCEDURE — 6360000002 HC RX W HCPCS: Performed by: PHYSICIAN ASSISTANT

## 2018-04-18 PROCEDURE — 87081 CULTURE SCREEN ONLY: CPT

## 2018-04-18 PROCEDURE — 86850 RBC ANTIBODY SCREEN: CPT

## 2018-04-18 PROCEDURE — 2580000003 HC RX 258: Performed by: ANESTHESIOLOGY

## 2018-04-18 PROCEDURE — 86901 BLOOD TYPING SEROLOGIC RH(D): CPT

## 2018-04-18 PROCEDURE — 2500000003 HC RX 250 WO HCPCS: Performed by: ANESTHESIOLOGY

## 2018-04-18 PROCEDURE — 87086 URINE CULTURE/COLONY COUNT: CPT

## 2018-04-18 PROCEDURE — 86923 COMPATIBILITY TEST ELECTRIC: CPT

## 2018-04-18 PROCEDURE — L8699 PROSTHETIC IMPLANT NOS: HCPCS | Performed by: ORTHOPAEDIC SURGERY

## 2018-04-18 PROCEDURE — 2500000003 HC RX 250 WO HCPCS: Performed by: ORTHOPAEDIC SURGERY

## 2018-04-18 PROCEDURE — 2500000003 HC RX 250 WO HCPCS: Performed by: NURSE ANESTHETIST, CERTIFIED REGISTERED

## 2018-04-18 PROCEDURE — 86900 BLOOD TYPING SEROLOGIC ABO: CPT

## 2018-04-18 PROCEDURE — 3700000000 HC ANESTHESIA ATTENDED CARE: Performed by: ORTHOPAEDIC SURGERY

## 2018-04-18 PROCEDURE — 72100 X-RAY EXAM L-S SPINE 2/3 VWS: CPT

## 2018-04-18 PROCEDURE — 0SG30AJ FUSION OF LUMBOSACRAL JOINT WITH INTERBODY FUSION DEVICE, POSTERIOR APPROACH, ANTERIOR COLUMN, OPEN APPROACH: ICD-10-PCS | Performed by: ORTHOPAEDIC SURGERY

## 2018-04-18 PROCEDURE — 7100000000 HC PACU RECOVERY - FIRST 15 MIN: Performed by: ORTHOPAEDIC SURGERY

## 2018-04-18 DEVICE — SCREW 55840006550 5.5/6 MAS 6.5X50 CC
Type: IMPLANTABLE DEVICE | Site: SPINE LUMBAR | Status: FUNCTIONAL
Brand: CD HORIZON® SPINAL SYSTEM

## 2018-04-18 DEVICE — BONE GRAFT KIT 7510800 INFUSE LARGE II
Type: IMPLANTABLE DEVICE | Site: SPINE LUMBAR | Status: FUNCTIONAL
Brand: INFUSE® BONE GRAFT

## 2018-04-18 DEVICE — CROSSLINK 811-322 LP MLTSP PL L 1.752.15
Type: IMPLANTABLE DEVICE | Site: SPINE LUMBAR | Status: FUNCTIONAL
Brand: TSRH® SPINAL SYSTEM

## 2018-04-18 DEVICE — DBM T42200 2.5CMX10CM 2 EACH GRAFTON MAT
Type: IMPLANTABLE DEVICE | Site: SPINE LUMBAR | Status: FUNCTIONAL
Brand: GRAFTON®AND GRAFTON PLUS®DEMINERALIZED BONE MATRIX (DBM)

## 2018-04-18 DEVICE — SPACER 3992211 22MM X 11MM
Type: IMPLANTABLE DEVICE | Site: SPINE LUMBAR | Status: FUNCTIONAL
Brand: CAPSTONE PTC™ SPINAL SYSTEM

## 2018-04-18 RX ORDER — ALBUTEROL SULFATE 90 UG/1
2 AEROSOL, METERED RESPIRATORY (INHALATION) EVERY 6 HOURS PRN
Status: DISCONTINUED | OUTPATIENT
Start: 2018-04-18 | End: 2018-04-21 | Stop reason: HOSPADM

## 2018-04-18 RX ORDER — HYDROMORPHONE HCL 110MG/55ML
PATIENT CONTROLLED ANALGESIA SYRINGE INTRAVENOUS PRN
Status: DISCONTINUED | OUTPATIENT
Start: 2018-04-18 | End: 2018-04-18 | Stop reason: SDUPTHER

## 2018-04-18 RX ORDER — GLYCOPYRROLATE 1 MG/5 ML
SYRINGE (ML) INTRAVENOUS PRN
Status: DISCONTINUED | OUTPATIENT
Start: 2018-04-18 | End: 2018-04-18 | Stop reason: SDUPTHER

## 2018-04-18 RX ORDER — OXYCODONE HYDROCHLORIDE AND ACETAMINOPHEN 5; 325 MG/1; MG/1
2 TABLET ORAL EVERY 4 HOURS PRN
Status: DISCONTINUED | OUTPATIENT
Start: 2018-04-18 | End: 2018-04-18

## 2018-04-18 RX ORDER — ALBUMIN, HUMAN INJ 5% 5 %
SOLUTION INTRAVENOUS
Status: DISPENSED
Start: 2018-04-18 | End: 2018-04-19

## 2018-04-18 RX ORDER — ROCURONIUM BROMIDE 10 MG/ML
INJECTION, SOLUTION INTRAVENOUS PRN
Status: DISCONTINUED | OUTPATIENT
Start: 2018-04-18 | End: 2018-04-18 | Stop reason: SDUPTHER

## 2018-04-18 RX ORDER — ATORVASTATIN CALCIUM 20 MG/1
20 TABLET, FILM COATED ORAL DAILY
Status: DISCONTINUED | OUTPATIENT
Start: 2018-04-19 | End: 2018-04-21 | Stop reason: HOSPADM

## 2018-04-18 RX ORDER — ONDANSETRON 2 MG/ML
4 INJECTION INTRAMUSCULAR; INTRAVENOUS EVERY 6 HOURS PRN
Status: DISCONTINUED | OUTPATIENT
Start: 2018-04-18 | End: 2018-04-21 | Stop reason: HOSPADM

## 2018-04-18 RX ORDER — CYCLOBENZAPRINE HCL 10 MG
10 TABLET ORAL 3 TIMES DAILY PRN
Status: DISCONTINUED | OUTPATIENT
Start: 2018-04-18 | End: 2018-04-19

## 2018-04-18 RX ORDER — NEOSTIGMINE METHYLSULFATE 1 MG/ML
INJECTION, SOLUTION INTRAVENOUS PRN
Status: DISCONTINUED | OUTPATIENT
Start: 2018-04-18 | End: 2018-04-18 | Stop reason: SDUPTHER

## 2018-04-18 RX ORDER — SODIUM CHLORIDE 0.9 % (FLUSH) 0.9 %
10 SYRINGE (ML) INJECTION EVERY 12 HOURS SCHEDULED
Status: DISCONTINUED | OUTPATIENT
Start: 2018-04-18 | End: 2018-04-21 | Stop reason: HOSPADM

## 2018-04-18 RX ORDER — PROPOFOL 10 MG/ML
INJECTION, EMULSION INTRAVENOUS PRN
Status: DISCONTINUED | OUTPATIENT
Start: 2018-04-18 | End: 2018-04-18 | Stop reason: SDUPTHER

## 2018-04-18 RX ORDER — FENTANYL CITRATE 50 UG/ML
25 INJECTION, SOLUTION INTRAMUSCULAR; INTRAVENOUS EVERY 5 MIN PRN
Status: DISCONTINUED | OUTPATIENT
Start: 2018-04-18 | End: 2018-04-18 | Stop reason: HOSPADM

## 2018-04-18 RX ORDER — OMEPRAZOLE 40 MG/1
40 CAPSULE, DELAYED RELEASE ORAL DAILY
Status: DISCONTINUED | OUTPATIENT
Start: 2018-04-18 | End: 2018-04-18 | Stop reason: CLARIF

## 2018-04-18 RX ORDER — EPHEDRINE SULFATE 50 MG/ML
INJECTION INTRAVENOUS PRN
Status: DISCONTINUED | OUTPATIENT
Start: 2018-04-18 | End: 2018-04-18 | Stop reason: SDUPTHER

## 2018-04-18 RX ORDER — LABETALOL HYDROCHLORIDE 5 MG/ML
5 INJECTION, SOLUTION INTRAVENOUS EVERY 10 MIN PRN
Status: DISCONTINUED | OUTPATIENT
Start: 2018-04-18 | End: 2018-04-18 | Stop reason: HOSPADM

## 2018-04-18 RX ORDER — ONDANSETRON 2 MG/ML
4 INJECTION INTRAMUSCULAR; INTRAVENOUS
Status: DISCONTINUED | OUTPATIENT
Start: 2018-04-18 | End: 2018-04-18 | Stop reason: HOSPADM

## 2018-04-18 RX ORDER — FENTANYL CITRATE 50 UG/ML
INJECTION, SOLUTION INTRAMUSCULAR; INTRAVENOUS PRN
Status: DISCONTINUED | OUTPATIENT
Start: 2018-04-18 | End: 2018-04-18 | Stop reason: SDUPTHER

## 2018-04-18 RX ORDER — SODIUM CHLORIDE 0.9 % (FLUSH) 0.9 %
10 SYRINGE (ML) INJECTION PRN
Status: DISCONTINUED | OUTPATIENT
Start: 2018-04-18 | End: 2018-04-21 | Stop reason: HOSPADM

## 2018-04-18 RX ORDER — LIDOCAINE HYDROCHLORIDE AND EPINEPHRINE 10; 10 MG/ML; UG/ML
INJECTION, SOLUTION INFILTRATION; PERINEURAL PRN
Status: DISCONTINUED | OUTPATIENT
Start: 2018-04-18 | End: 2018-04-18 | Stop reason: HOSPADM

## 2018-04-18 RX ORDER — ALBUMIN, HUMAN INJ 5% 5 %
SOLUTION INTRAVENOUS PRN
Status: DISCONTINUED | OUTPATIENT
Start: 2018-04-18 | End: 2018-04-18 | Stop reason: SDUPTHER

## 2018-04-18 RX ORDER — ACETAMINOPHEN 650 MG/1
650 SUPPOSITORY RECTAL EVERY 4 HOURS PRN
Status: DISCONTINUED | OUTPATIENT
Start: 2018-04-18 | End: 2018-04-21 | Stop reason: HOSPADM

## 2018-04-18 RX ORDER — SODIUM CHLORIDE 9 MG/ML
INJECTION, SOLUTION INTRAVENOUS CONTINUOUS
Status: DISCONTINUED | OUTPATIENT
Start: 2018-04-18 | End: 2018-04-21 | Stop reason: HOSPADM

## 2018-04-18 RX ORDER — SODIUM CHLORIDE, SODIUM LACTATE, POTASSIUM CHLORIDE, CALCIUM CHLORIDE 600; 310; 30; 20 MG/100ML; MG/100ML; MG/100ML; MG/100ML
INJECTION, SOLUTION INTRAVENOUS CONTINUOUS PRN
Status: DISCONTINUED | OUTPATIENT
Start: 2018-04-18 | End: 2018-04-18 | Stop reason: SDUPTHER

## 2018-04-18 RX ORDER — ALBUMIN, HUMAN INJ 5% 5 %
25 SOLUTION INTRAVENOUS ONCE
Status: COMPLETED | OUTPATIENT
Start: 2018-04-18 | End: 2018-04-18

## 2018-04-18 RX ORDER — SODIUM CHLORIDE 9 MG/ML
INJECTION, SOLUTION INTRAVENOUS CONTINUOUS PRN
Status: DISCONTINUED | OUTPATIENT
Start: 2018-04-18 | End: 2018-04-18 | Stop reason: SDUPTHER

## 2018-04-18 RX ORDER — ACETAMINOPHEN 325 MG/1
650 TABLET ORAL EVERY 4 HOURS PRN
Status: DISCONTINUED | OUTPATIENT
Start: 2018-04-18 | End: 2018-04-21 | Stop reason: HOSPADM

## 2018-04-18 RX ORDER — PANTOPRAZOLE SODIUM 40 MG/1
40 TABLET, DELAYED RELEASE ORAL
Status: DISCONTINUED | OUTPATIENT
Start: 2018-04-18 | End: 2018-04-21 | Stop reason: HOSPADM

## 2018-04-18 RX ORDER — PROMETHAZINE HYDROCHLORIDE 25 MG/ML
6.25 INJECTION, SOLUTION INTRAMUSCULAR; INTRAVENOUS
Status: DISCONTINUED | OUTPATIENT
Start: 2018-04-18 | End: 2018-04-18 | Stop reason: HOSPADM

## 2018-04-18 RX ORDER — SODIUM CHLORIDE 9 MG/ML
INJECTION, SOLUTION INTRAVENOUS CONTINUOUS
Status: DISCONTINUED | OUTPATIENT
Start: 2018-04-18 | End: 2018-04-18

## 2018-04-18 RX ORDER — KETAMINE HYDROCHLORIDE 50 MG/ML
INJECTION, SOLUTION, CONCENTRATE INTRAMUSCULAR; INTRAVENOUS PRN
Status: DISCONTINUED | OUTPATIENT
Start: 2018-04-18 | End: 2018-04-18 | Stop reason: SDUPTHER

## 2018-04-18 RX ORDER — MEPERIDINE HYDROCHLORIDE 25 MG/ML
12.5 INJECTION INTRAMUSCULAR; INTRAVENOUS; SUBCUTANEOUS EVERY 5 MIN PRN
Status: DISCONTINUED | OUTPATIENT
Start: 2018-04-18 | End: 2018-04-18 | Stop reason: HOSPADM

## 2018-04-18 RX ORDER — HYDRALAZINE HYDROCHLORIDE 20 MG/ML
5 INJECTION INTRAMUSCULAR; INTRAVENOUS EVERY 10 MIN PRN
Status: DISCONTINUED | OUTPATIENT
Start: 2018-04-18 | End: 2018-04-18 | Stop reason: HOSPADM

## 2018-04-18 RX ORDER — OXYCODONE HCL 10 MG/1
10 TABLET, FILM COATED, EXTENDED RELEASE ORAL EVERY 12 HOURS SCHEDULED
Status: DISCONTINUED | OUTPATIENT
Start: 2018-04-18 | End: 2018-04-19

## 2018-04-18 RX ORDER — ALBUMIN, HUMAN INJ 5% 5 %
SOLUTION INTRAVENOUS
Status: COMPLETED
Start: 2018-04-18 | End: 2018-04-18

## 2018-04-18 RX ORDER — FENTANYL CITRATE 50 UG/ML
50 INJECTION, SOLUTION INTRAMUSCULAR; INTRAVENOUS EVERY 5 MIN PRN
Status: DISCONTINUED | OUTPATIENT
Start: 2018-04-18 | End: 2018-04-18 | Stop reason: HOSPADM

## 2018-04-18 RX ORDER — ONDANSETRON 2 MG/ML
INJECTION INTRAMUSCULAR; INTRAVENOUS PRN
Status: DISCONTINUED | OUTPATIENT
Start: 2018-04-18 | End: 2018-04-18 | Stop reason: SDUPTHER

## 2018-04-18 RX ORDER — 0.9 % SODIUM CHLORIDE 0.9 %
250 INTRAVENOUS SOLUTION INTRAVENOUS ONCE
Status: DISCONTINUED | OUTPATIENT
Start: 2018-04-18 | End: 2018-04-18

## 2018-04-18 RX ORDER — OXYCODONE AND ACETAMINOPHEN 7.5; 325 MG/1; MG/1
1 TABLET ORAL EVERY 4 HOURS PRN
Status: DISCONTINUED | OUTPATIENT
Start: 2018-04-18 | End: 2018-04-21 | Stop reason: HOSPADM

## 2018-04-18 RX ORDER — OXYCODONE HYDROCHLORIDE AND ACETAMINOPHEN 5; 325 MG/1; MG/1
1 TABLET ORAL EVERY 4 HOURS PRN
Status: DISCONTINUED | OUTPATIENT
Start: 2018-04-18 | End: 2018-04-18

## 2018-04-18 RX ORDER — MIDAZOLAM HYDROCHLORIDE 1 MG/ML
INJECTION INTRAMUSCULAR; INTRAVENOUS PRN
Status: DISCONTINUED | OUTPATIENT
Start: 2018-04-18 | End: 2018-04-18 | Stop reason: SDUPTHER

## 2018-04-18 RX ORDER — DOCUSATE SODIUM 100 MG/1
100 CAPSULE, LIQUID FILLED ORAL 2 TIMES DAILY
Status: DISCONTINUED | OUTPATIENT
Start: 2018-04-18 | End: 2018-04-21 | Stop reason: HOSPADM

## 2018-04-18 RX ADMIN — KETAMINE HYDROCHLORIDE 15 MG: 50 INJECTION, SOLUTION INTRAMUSCULAR; INTRAVENOUS at 12:00

## 2018-04-18 RX ADMIN — EPHEDRINE SULFATE 5 MG: 50 INJECTION, SOLUTION INTRAVENOUS at 09:55

## 2018-04-18 RX ADMIN — SODIUM CHLORIDE: 9 INJECTION, SOLUTION INTRAVENOUS at 08:45

## 2018-04-18 RX ADMIN — PHENYLEPHRINE HYDROCHLORIDE 100 MCG: 10 INJECTION INTRAMUSCULAR; INTRAVENOUS; SUBCUTANEOUS at 11:20

## 2018-04-18 RX ADMIN — Medication 50 MG: at 08:54

## 2018-04-18 RX ADMIN — OXYCODONE HYDROCHLORIDE 10 MG: 10 TABLET, FILM COATED, EXTENDED RELEASE ORAL at 16:09

## 2018-04-18 RX ADMIN — ALBUMIN (HUMAN) 250 ML: 12.5 SOLUTION INTRAVENOUS at 12:43

## 2018-04-18 RX ADMIN — ALBUMIN, HUMAN INJ 5% 25 G: 5 SOLUTION at 13:25

## 2018-04-18 RX ADMIN — HYDROMORPHONE HYDROCHLORIDE 0.2 MG: 2 INJECTION INTRAMUSCULAR; INTRAVENOUS; SUBCUTANEOUS at 10:44

## 2018-04-18 RX ADMIN — Medication 10 ML: at 20:15

## 2018-04-18 RX ADMIN — Medication 20 MG: at 10:01

## 2018-04-18 RX ADMIN — CEFAZOLIN SODIUM 2 G: 2 SOLUTION INTRAVENOUS at 16:17

## 2018-04-18 RX ADMIN — KETAMINE HYDROCHLORIDE 10 MG: 50 INJECTION, SOLUTION INTRAMUSCULAR; INTRAVENOUS at 12:42

## 2018-04-18 RX ADMIN — SODIUM CHLORIDE: 9 INJECTION, SOLUTION INTRAVENOUS at 07:15

## 2018-04-18 RX ADMIN — CEFAZOLIN SODIUM 2 G: 2 SOLUTION INTRAVENOUS at 09:00

## 2018-04-18 RX ADMIN — ONDANSETRON 4 MG: 2 INJECTION INTRAMUSCULAR; INTRAVENOUS at 12:45

## 2018-04-18 RX ADMIN — NEOSTIGMINE METHYLSULFATE 5 MG: 1 INJECTION, SOLUTION INTRAVENOUS at 12:48

## 2018-04-18 RX ADMIN — FENTANYL CITRATE 25 MCG: 50 INJECTION, SOLUTION INTRAMUSCULAR; INTRAVENOUS at 13:45

## 2018-04-18 RX ADMIN — SODIUM CHLORIDE: 9 INJECTION, SOLUTION INTRAVENOUS at 17:34

## 2018-04-18 RX ADMIN — HYDROMORPHONE HYDROCHLORIDE 0.5 MG: 1 INJECTION, SOLUTION INTRAMUSCULAR; INTRAVENOUS; SUBCUTANEOUS at 14:55

## 2018-04-18 RX ADMIN — HYDROMORPHONE HYDROCHLORIDE 0.2 MG: 2 INJECTION INTRAMUSCULAR; INTRAVENOUS; SUBCUTANEOUS at 12:48

## 2018-04-18 RX ADMIN — KETAMINE HYDROCHLORIDE 10 MG: 50 INJECTION, SOLUTION INTRAMUSCULAR; INTRAVENOUS at 10:46

## 2018-04-18 RX ADMIN — PANTOPRAZOLE SODIUM 40 MG: 40 TABLET, DELAYED RELEASE ORAL at 16:09

## 2018-04-18 RX ADMIN — Medication 0.8 MG: at 12:48

## 2018-04-18 RX ADMIN — PHENYLEPHRINE HYDROCHLORIDE 100 MCG: 10 INJECTION INTRAMUSCULAR; INTRAVENOUS; SUBCUTANEOUS at 10:15

## 2018-04-18 RX ADMIN — SODIUM CHLORIDE: 9 INJECTION, SOLUTION INTRAVENOUS at 14:58

## 2018-04-18 RX ADMIN — HYDROMORPHONE HYDROCHLORIDE 0.5 MG: 1 INJECTION, SOLUTION INTRAMUSCULAR; INTRAVENOUS; SUBCUTANEOUS at 19:05

## 2018-04-18 RX ADMIN — PHENYLEPHRINE HYDROCHLORIDE 100 MCG: 10 INJECTION INTRAMUSCULAR; INTRAVENOUS; SUBCUTANEOUS at 09:55

## 2018-04-18 RX ADMIN — Medication 10 MG: at 10:40

## 2018-04-18 RX ADMIN — MIDAZOLAM HYDROCHLORIDE 2 MG: 1 INJECTION, SOLUTION INTRAMUSCULAR; INTRAVENOUS at 08:45

## 2018-04-18 RX ADMIN — KETAMINE HYDROCHLORIDE 10 MG: 50 INJECTION, SOLUTION INTRAMUSCULAR; INTRAVENOUS at 11:10

## 2018-04-18 RX ADMIN — SODIUM CHLORIDE: 9 INJECTION, SOLUTION INTRAVENOUS at 10:30

## 2018-04-18 RX ADMIN — PHENYLEPHRINE HYDROCHLORIDE 100 MCG: 10 INJECTION INTRAMUSCULAR; INTRAVENOUS; SUBCUTANEOUS at 11:43

## 2018-04-18 RX ADMIN — PHENYLEPHRINE HYDROCHLORIDE 50 MCG/MIN: 10 INJECTION, SOLUTION INTRAMUSCULAR; INTRAVENOUS; SUBCUTANEOUS at 12:00

## 2018-04-18 RX ADMIN — CYCLOBENZAPRINE 10 MG: 10 TABLET, FILM COATED ORAL at 16:09

## 2018-04-18 RX ADMIN — FENTANYL CITRATE 100 MCG: 50 INJECTION INTRAMUSCULAR; INTRAVENOUS at 09:20

## 2018-04-18 RX ADMIN — SODIUM CHLORIDE: 9 INJECTION, SOLUTION INTRAVENOUS at 12:15

## 2018-04-18 RX ADMIN — OXYCODONE HYDROCHLORIDE AND ACETAMINOPHEN 1 TABLET: 7.5; 325 TABLET ORAL at 20:14

## 2018-04-18 RX ADMIN — Medication 20 MG: at 09:20

## 2018-04-18 RX ADMIN — PHENYLEPHRINE HYDROCHLORIDE 100 MCG: 10 INJECTION INTRAMUSCULAR; INTRAVENOUS; SUBCUTANEOUS at 11:30

## 2018-04-18 RX ADMIN — DOCUSATE SODIUM 100 MG: 100 CAPSULE, LIQUID FILLED ORAL at 20:11

## 2018-04-18 RX ADMIN — FENTANYL CITRATE 150 MCG: 50 INJECTION INTRAMUSCULAR; INTRAVENOUS at 08:54

## 2018-04-18 RX ADMIN — FENTANYL CITRATE 25 MCG: 50 INJECTION, SOLUTION INTRAMUSCULAR; INTRAVENOUS at 13:50

## 2018-04-18 RX ADMIN — ALBUMIN (HUMAN) 25 G: 12.5 INJECTION, SOLUTION INTRAVENOUS at 13:25

## 2018-04-18 RX ADMIN — HYDROMORPHONE HYDROCHLORIDE 0.2 MG: 2 INJECTION INTRAMUSCULAR; INTRAVENOUS; SUBCUTANEOUS at 11:11

## 2018-04-18 RX ADMIN — HYDROMORPHONE HYDROCHLORIDE 0.4 MG: 2 INJECTION INTRAMUSCULAR; INTRAVENOUS; SUBCUTANEOUS at 12:06

## 2018-04-18 RX ADMIN — SODIUM CHLORIDE, POTASSIUM CHLORIDE, SODIUM LACTATE AND CALCIUM CHLORIDE: 600; 310; 30; 20 INJECTION, SOLUTION INTRAVENOUS at 08:55

## 2018-04-18 RX ADMIN — PROPOFOL 160 MG: 10 INJECTION, EMULSION INTRAVENOUS at 08:54

## 2018-04-18 ASSESSMENT — PULMONARY FUNCTION TESTS
PIF_VALUE: 17
PIF_VALUE: 19
PIF_VALUE: 16
PIF_VALUE: 18
PIF_VALUE: 18
PIF_VALUE: 16
PIF_VALUE: 16
PIF_VALUE: 10
PIF_VALUE: 1
PIF_VALUE: 17
PIF_VALUE: 20
PIF_VALUE: 18
PIF_VALUE: 16
PIF_VALUE: 19
PIF_VALUE: 16
PIF_VALUE: 14
PIF_VALUE: 0
PIF_VALUE: 19
PIF_VALUE: 12
PIF_VALUE: 12
PIF_VALUE: 20
PIF_VALUE: 16
PIF_VALUE: 1
PIF_VALUE: 19
PIF_VALUE: 18
PIF_VALUE: 16
PIF_VALUE: 20
PIF_VALUE: 18
PIF_VALUE: 18
PIF_VALUE: 15
PIF_VALUE: 20
PIF_VALUE: 17
PIF_VALUE: 11
PIF_VALUE: 19
PIF_VALUE: 18
PIF_VALUE: 20
PIF_VALUE: 20
PIF_VALUE: 9
PIF_VALUE: 20
PIF_VALUE: 18
PIF_VALUE: 16
PIF_VALUE: 18
PIF_VALUE: 2
PIF_VALUE: 0
PIF_VALUE: 19
PIF_VALUE: 12
PIF_VALUE: 19
PIF_VALUE: 11
PIF_VALUE: 8
PIF_VALUE: 19
PIF_VALUE: 17
PIF_VALUE: 19
PIF_VALUE: 20
PIF_VALUE: 19
PIF_VALUE: 18
PIF_VALUE: 12
PIF_VALUE: 1
PIF_VALUE: 20
PIF_VALUE: 18
PIF_VALUE: 13
PIF_VALUE: 16
PIF_VALUE: 20
PIF_VALUE: 20
PIF_VALUE: 18
PIF_VALUE: 17
PIF_VALUE: 19
PIF_VALUE: 19
PIF_VALUE: 13
PIF_VALUE: 22
PIF_VALUE: 19
PIF_VALUE: 19
PIF_VALUE: 18
PIF_VALUE: 18
PIF_VALUE: 19
PIF_VALUE: 21
PIF_VALUE: 16
PIF_VALUE: 21
PIF_VALUE: 19
PIF_VALUE: 5
PIF_VALUE: 16
PIF_VALUE: 2
PIF_VALUE: 19
PIF_VALUE: 18
PIF_VALUE: 17
PIF_VALUE: 12
PIF_VALUE: 19
PIF_VALUE: 9
PIF_VALUE: 18
PIF_VALUE: 19
PIF_VALUE: 18
PIF_VALUE: 18
PIF_VALUE: 14
PIF_VALUE: 16
PIF_VALUE: 17
PIF_VALUE: 12
PIF_VALUE: 16
PIF_VALUE: 20
PIF_VALUE: 18
PIF_VALUE: 17
PIF_VALUE: 14
PIF_VALUE: 20
PIF_VALUE: 21
PIF_VALUE: 18
PIF_VALUE: 16
PIF_VALUE: 17
PIF_VALUE: 18
PIF_VALUE: 19
PIF_VALUE: 18
PIF_VALUE: 18
PIF_VALUE: 2
PIF_VALUE: 20
PIF_VALUE: 18
PIF_VALUE: 16
PIF_VALUE: 18
PIF_VALUE: 16
PIF_VALUE: 8
PIF_VALUE: 17
PIF_VALUE: 21
PIF_VALUE: 19
PIF_VALUE: 8
PIF_VALUE: 12
PIF_VALUE: 17
PIF_VALUE: 18
PIF_VALUE: 18
PIF_VALUE: 20
PIF_VALUE: 20
PIF_VALUE: 19
PIF_VALUE: 19
PIF_VALUE: 21
PIF_VALUE: 2
PIF_VALUE: 16
PIF_VALUE: 19
PIF_VALUE: 19
PIF_VALUE: 0
PIF_VALUE: 18
PIF_VALUE: 19
PIF_VALUE: 19
PIF_VALUE: 20
PIF_VALUE: 15
PIF_VALUE: 16
PIF_VALUE: 19
PIF_VALUE: 15
PIF_VALUE: 18
PIF_VALUE: 14
PIF_VALUE: 10
PIF_VALUE: 12
PIF_VALUE: 16
PIF_VALUE: 16
PIF_VALUE: 19
PIF_VALUE: 18
PIF_VALUE: 19
PIF_VALUE: 9
PIF_VALUE: 20
PIF_VALUE: 19
PIF_VALUE: 20
PIF_VALUE: 19
PIF_VALUE: 16
PIF_VALUE: 19
PIF_VALUE: 16
PIF_VALUE: 16
PIF_VALUE: 11
PIF_VALUE: 17
PIF_VALUE: 16
PIF_VALUE: 9
PIF_VALUE: 9
PIF_VALUE: 19
PIF_VALUE: 5
PIF_VALUE: 19
PIF_VALUE: 3
PIF_VALUE: 16
PIF_VALUE: 19
PIF_VALUE: 19
PIF_VALUE: 17
PIF_VALUE: 20
PIF_VALUE: 25
PIF_VALUE: 5
PIF_VALUE: 20
PIF_VALUE: 19
PIF_VALUE: 13
PIF_VALUE: 21
PIF_VALUE: 19
PIF_VALUE: 16
PIF_VALUE: 16
PIF_VALUE: 18
PIF_VALUE: 21
PIF_VALUE: 10
PIF_VALUE: 19
PIF_VALUE: 12
PIF_VALUE: 14
PIF_VALUE: 18
PIF_VALUE: 18
PIF_VALUE: 16
PIF_VALUE: 17
PIF_VALUE: 11
PIF_VALUE: 16
PIF_VALUE: 18
PIF_VALUE: 11
PIF_VALUE: 12
PIF_VALUE: 18
PIF_VALUE: 16
PIF_VALUE: 9
PIF_VALUE: 16
PIF_VALUE: 8
PIF_VALUE: 9
PIF_VALUE: 19
PIF_VALUE: 21
PIF_VALUE: 18
PIF_VALUE: 16
PIF_VALUE: 12
PIF_VALUE: 18
PIF_VALUE: 18
PIF_VALUE: 16
PIF_VALUE: 18
PIF_VALUE: 19
PIF_VALUE: 18
PIF_VALUE: 16
PIF_VALUE: 18
PIF_VALUE: 19
PIF_VALUE: 20
PIF_VALUE: 12
PIF_VALUE: 20
PIF_VALUE: 19
PIF_VALUE: 18
PIF_VALUE: 19
PIF_VALUE: 21
PIF_VALUE: 17
PIF_VALUE: 16
PIF_VALUE: 13
PIF_VALUE: 19
PIF_VALUE: 13
PIF_VALUE: 3
PIF_VALUE: 11
PIF_VALUE: 20
PIF_VALUE: 2
PIF_VALUE: 16
PIF_VALUE: 3
PIF_VALUE: 20
PIF_VALUE: 16
PIF_VALUE: 19
PIF_VALUE: 20
PIF_VALUE: 12
PIF_VALUE: 17
PIF_VALUE: 18
PIF_VALUE: 17
PIF_VALUE: 18
PIF_VALUE: 16
PIF_VALUE: 20
PIF_VALUE: 18
PIF_VALUE: 16
PIF_VALUE: 18
PIF_VALUE: 19
PIF_VALUE: 20
PIF_VALUE: 19
PIF_VALUE: 9
PIF_VALUE: 17

## 2018-04-18 ASSESSMENT — PAIN DESCRIPTION - PAIN TYPE
TYPE: SURGICAL PAIN
TYPE: SURGICAL PAIN

## 2018-04-18 ASSESSMENT — PAIN DESCRIPTION - LOCATION
LOCATION: BACK
LOCATION: BACK

## 2018-04-18 ASSESSMENT — PAIN DESCRIPTION - ORIENTATION: ORIENTATION: RIGHT;LOWER

## 2018-04-18 ASSESSMENT — PAIN SCALES - GENERAL
PAINLEVEL_OUTOF10: 8
PAINLEVEL_OUTOF10: 10
PAINLEVEL_OUTOF10: 6
PAINLEVEL_OUTOF10: 6
PAINLEVEL_OUTOF10: 10
PAINLEVEL_OUTOF10: 9
PAINLEVEL_OUTOF10: 10
PAINLEVEL_OUTOF10: 10
PAINLEVEL_OUTOF10: 7

## 2018-04-18 ASSESSMENT — PAIN DESCRIPTION - FREQUENCY: FREQUENCY: CONTINUOUS

## 2018-04-18 ASSESSMENT — PAIN DESCRIPTION - DESCRIPTORS
DESCRIPTORS: SHARP;SORE
DESCRIPTORS: CONSTANT

## 2018-04-18 ASSESSMENT — PAIN DESCRIPTION - ONSET: ONSET: ON-GOING

## 2018-04-18 ASSESSMENT — ENCOUNTER SYMPTOMS: SHORTNESS OF BREATH: 1

## 2018-04-19 LAB
ANISOCYTOSIS: ABNORMAL
BASOPHILS # BLD: 0.3 %
BASOPHILS ABSOLUTE: 0 THOU/MM3 (ref 0–0.1)
EOSINOPHIL # BLD: 0.2 %
EOSINOPHILS ABSOLUTE: 0 THOU/MM3 (ref 0–0.4)
HCT VFR BLD CALC: 28.9 % (ref 42–52)
HEMOGLOBIN: 9.4 GM/DL (ref 14–18)
LYMPHOCYTES # BLD: 13.2 %
LYMPHOCYTES ABSOLUTE: 1.8 THOU/MM3 (ref 1–4.8)
MCH RBC QN AUTO: 29.3 PG (ref 27–31)
MCHC RBC AUTO-ENTMCNC: 32.7 GM/DL (ref 33–37)
MCV RBC AUTO: 89.6 FL (ref 80–94)
MONOCYTES # BLD: 12.1 %
MONOCYTES ABSOLUTE: 1.6 THOU/MM3 (ref 0.4–1.3)
NUCLEATED RED BLOOD CELLS: 0 /100 WBC
PDW BLD-RTO: 14.7 % (ref 11.5–14.5)
PLATELET # BLD: 161 THOU/MM3 (ref 130–400)
PMV BLD AUTO: 8.7 FL (ref 7.4–10.4)
RBC # BLD: 3.22 MILL/MM3 (ref 4.7–6.1)
SEG NEUTROPHILS: 74.2 %
SEGMENTED NEUTROPHILS ABSOLUTE COUNT: 9.9 THOU/MM3 (ref 1.8–7.7)
WBC # BLD: 13.3 THOU/MM3 (ref 4.8–10.8)

## 2018-04-19 PROCEDURE — 97162 PT EVAL MOD COMPLEX 30 MIN: CPT

## 2018-04-19 PROCEDURE — G8987 SELF CARE CURRENT STATUS: HCPCS

## 2018-04-19 PROCEDURE — 97165 OT EVAL LOW COMPLEX 30 MIN: CPT

## 2018-04-19 PROCEDURE — 6370000000 HC RX 637 (ALT 250 FOR IP): Performed by: PHYSICIAN ASSISTANT

## 2018-04-19 PROCEDURE — 97530 THERAPEUTIC ACTIVITIES: CPT

## 2018-04-19 PROCEDURE — 6370000000 HC RX 637 (ALT 250 FOR IP): Performed by: ORTHOPAEDIC SURGERY

## 2018-04-19 PROCEDURE — 36415 COLL VENOUS BLD VENIPUNCTURE: CPT

## 2018-04-19 PROCEDURE — 2580000003 HC RX 258: Performed by: ORTHOPAEDIC SURGERY

## 2018-04-19 PROCEDURE — 85025 COMPLETE CBC W/AUTO DIFF WBC: CPT

## 2018-04-19 PROCEDURE — G8978 MOBILITY CURRENT STATUS: HCPCS

## 2018-04-19 PROCEDURE — 1200000000 HC SEMI PRIVATE

## 2018-04-19 PROCEDURE — 6360000002 HC RX W HCPCS: Performed by: ORTHOPAEDIC SURGERY

## 2018-04-19 PROCEDURE — G8988 SELF CARE GOAL STATUS: HCPCS

## 2018-04-19 PROCEDURE — G8979 MOBILITY GOAL STATUS: HCPCS

## 2018-04-19 RX ORDER — OXYCODONE HCL 20 MG/1
20 TABLET, FILM COATED, EXTENDED RELEASE ORAL EVERY 12 HOURS SCHEDULED
Status: DISCONTINUED | OUTPATIENT
Start: 2018-04-19 | End: 2018-04-21 | Stop reason: HOSPADM

## 2018-04-19 RX ORDER — TIZANIDINE 4 MG/1
4 TABLET ORAL 2 TIMES DAILY
Status: DISCONTINUED | OUTPATIENT
Start: 2018-04-19 | End: 2018-04-21 | Stop reason: HOSPADM

## 2018-04-19 RX ADMIN — Medication 10 ML: at 21:07

## 2018-04-19 RX ADMIN — OXYCODONE HYDROCHLORIDE 20 MG: 20 TABLET, FILM COATED, EXTENDED RELEASE ORAL at 21:06

## 2018-04-19 RX ADMIN — OXYCODONE HYDROCHLORIDE AND ACETAMINOPHEN 1 TABLET: 7.5; 325 TABLET ORAL at 12:41

## 2018-04-19 RX ADMIN — TIZANIDINE 4 MG: 4 TABLET ORAL at 21:06

## 2018-04-19 RX ADMIN — Medication 10 ML: at 08:29

## 2018-04-19 RX ADMIN — CEFAZOLIN SODIUM 2 G: 2 SOLUTION INTRAVENOUS at 00:42

## 2018-04-19 RX ADMIN — SODIUM CHLORIDE: 9 INJECTION, SOLUTION INTRAVENOUS at 21:07

## 2018-04-19 RX ADMIN — OXYCODONE HYDROCHLORIDE AND ACETAMINOPHEN 1 TABLET: 7.5; 325 TABLET ORAL at 08:17

## 2018-04-19 RX ADMIN — DOCUSATE SODIUM 100 MG: 100 CAPSULE, LIQUID FILLED ORAL at 08:28

## 2018-04-19 RX ADMIN — ATORVASTATIN CALCIUM 20 MG: 20 TABLET, FILM COATED ORAL at 08:28

## 2018-04-19 RX ADMIN — OXYCODONE HYDROCHLORIDE 20 MG: 20 TABLET, FILM COATED, EXTENDED RELEASE ORAL at 11:00

## 2018-04-19 RX ADMIN — OXYCODONE HYDROCHLORIDE AND ACETAMINOPHEN 1 TABLET: 7.5; 325 TABLET ORAL at 17:15

## 2018-04-19 RX ADMIN — OXYCODONE HYDROCHLORIDE AND ACETAMINOPHEN 1 TABLET: 7.5; 325 TABLET ORAL at 04:17

## 2018-04-19 RX ADMIN — OXYCODONE HYDROCHLORIDE AND ACETAMINOPHEN 1 TABLET: 7.5; 325 TABLET ORAL at 00:17

## 2018-04-19 RX ADMIN — DOCUSATE SODIUM 100 MG: 100 CAPSULE, LIQUID FILLED ORAL at 21:06

## 2018-04-19 RX ADMIN — PANTOPRAZOLE SODIUM 40 MG: 40 TABLET, DELAYED RELEASE ORAL at 07:00

## 2018-04-19 RX ADMIN — SODIUM CHLORIDE: 9 INJECTION, SOLUTION INTRAVENOUS at 04:16

## 2018-04-19 RX ADMIN — TIZANIDINE 4 MG: 4 TABLET ORAL at 12:41

## 2018-04-19 ASSESSMENT — PAIN DESCRIPTION - DESCRIPTORS
DESCRIPTORS: ACHING;CONSTANT
DESCRIPTORS: ACHING;CONSTANT;PRESSURE

## 2018-04-19 ASSESSMENT — PAIN DESCRIPTION - PAIN TYPE
TYPE: SURGICAL PAIN

## 2018-04-19 ASSESSMENT — PAIN SCALES - GENERAL
PAINLEVEL_OUTOF10: 10
PAINLEVEL_OUTOF10: 9
PAINLEVEL_OUTOF10: 10
PAINLEVEL_OUTOF10: 9
PAINLEVEL_OUTOF10: 6
PAINLEVEL_OUTOF10: 9
PAINLEVEL_OUTOF10: 9
PAINLEVEL_OUTOF10: 8

## 2018-04-19 ASSESSMENT — PAIN DESCRIPTION - LOCATION
LOCATION: BACK

## 2018-04-19 ASSESSMENT — PAIN DESCRIPTION - FREQUENCY
FREQUENCY: CONTINUOUS
FREQUENCY: CONTINUOUS

## 2018-04-19 ASSESSMENT — PAIN DESCRIPTION - PROGRESSION: CLINICAL_PROGRESSION: GRADUALLY WORSENING

## 2018-04-19 ASSESSMENT — PAIN DESCRIPTION - ORIENTATION
ORIENTATION: LOWER

## 2018-04-20 LAB
ANISOCYTOSIS: ABNORMAL
BASOPHILS # BLD: 0.4 %
BASOPHILS ABSOLUTE: 0 THOU/MM3 (ref 0–0.1)
EOSINOPHIL # BLD: 0.6 %
EOSINOPHILS ABSOLUTE: 0.1 THOU/MM3 (ref 0–0.4)
HCT VFR BLD CALC: 24.8 % (ref 42–52)
HEMOGLOBIN: 8.4 GM/DL (ref 14–18)
LYMPHOCYTES # BLD: 17.8 %
LYMPHOCYTES ABSOLUTE: 1.9 THOU/MM3 (ref 1–4.8)
MCH RBC QN AUTO: 30.2 PG (ref 27–31)
MCHC RBC AUTO-ENTMCNC: 33.9 GM/DL (ref 33–37)
MCV RBC AUTO: 89.2 FL (ref 80–94)
MONOCYTES # BLD: 11.6 %
MONOCYTES ABSOLUTE: 1.2 THOU/MM3 (ref 0.4–1.3)
MRSA SCREEN: NORMAL
NUCLEATED RED BLOOD CELLS: 0 /100 WBC
PDW BLD-RTO: 15.3 % (ref 11.5–14.5)
PLATELET # BLD: 146 THOU/MM3 (ref 130–400)
PMV BLD AUTO: 8.4 FL (ref 7.4–10.4)
RBC # BLD: 2.78 MILL/MM3 (ref 4.7–6.1)
SEG NEUTROPHILS: 69.6 %
SEGMENTED NEUTROPHILS ABSOLUTE COUNT: 7.4 THOU/MM3 (ref 1.8–7.7)
URINE CULTURE, ROUTINE: NORMAL
VRE CULTURE: NORMAL
WBC # BLD: 10.7 THOU/MM3 (ref 4.8–10.8)

## 2018-04-20 PROCEDURE — 6370000000 HC RX 637 (ALT 250 FOR IP): Performed by: PHYSICIAN ASSISTANT

## 2018-04-20 PROCEDURE — 36415 COLL VENOUS BLD VENIPUNCTURE: CPT

## 2018-04-20 PROCEDURE — 85025 COMPLETE CBC W/AUTO DIFF WBC: CPT

## 2018-04-20 PROCEDURE — 97530 THERAPEUTIC ACTIVITIES: CPT

## 2018-04-20 PROCEDURE — 97116 GAIT TRAINING THERAPY: CPT

## 2018-04-20 PROCEDURE — 97110 THERAPEUTIC EXERCISES: CPT

## 2018-04-20 PROCEDURE — 2580000003 HC RX 258: Performed by: ORTHOPAEDIC SURGERY

## 2018-04-20 PROCEDURE — 1200000000 HC SEMI PRIVATE

## 2018-04-20 PROCEDURE — 6370000000 HC RX 637 (ALT 250 FOR IP): Performed by: ORTHOPAEDIC SURGERY

## 2018-04-20 RX ORDER — POLYETHYLENE GLYCOL 3350 17 G/17G
17 POWDER, FOR SOLUTION ORAL ONCE
Status: COMPLETED | OUTPATIENT
Start: 2018-04-20 | End: 2018-04-20

## 2018-04-20 RX ADMIN — POLYETHYLENE GLYCOL 3350 17 G: 17 POWDER, FOR SOLUTION ORAL at 09:02

## 2018-04-20 RX ADMIN — PANTOPRAZOLE SODIUM 40 MG: 40 TABLET, DELAYED RELEASE ORAL at 05:27

## 2018-04-20 RX ADMIN — SODIUM CHLORIDE: 9 INJECTION, SOLUTION INTRAVENOUS at 05:28

## 2018-04-20 RX ADMIN — OXYCODONE HYDROCHLORIDE AND ACETAMINOPHEN 1 TABLET: 7.5; 325 TABLET ORAL at 05:36

## 2018-04-20 RX ADMIN — DOCUSATE SODIUM 100 MG: 100 CAPSULE, LIQUID FILLED ORAL at 09:02

## 2018-04-20 RX ADMIN — OXYCODONE HYDROCHLORIDE AND ACETAMINOPHEN 1 TABLET: 7.5; 325 TABLET ORAL at 01:18

## 2018-04-20 RX ADMIN — OXYCODONE HYDROCHLORIDE AND ACETAMINOPHEN 1 TABLET: 7.5; 325 TABLET ORAL at 10:46

## 2018-04-20 RX ADMIN — DOCUSATE SODIUM 100 MG: 100 CAPSULE, LIQUID FILLED ORAL at 20:17

## 2018-04-20 RX ADMIN — TIZANIDINE 4 MG: 4 TABLET ORAL at 20:17

## 2018-04-20 RX ADMIN — OXYCODONE HYDROCHLORIDE AND ACETAMINOPHEN 1 TABLET: 7.5; 325 TABLET ORAL at 15:11

## 2018-04-20 RX ADMIN — TIZANIDINE 4 MG: 4 TABLET ORAL at 09:02

## 2018-04-20 RX ADMIN — OXYCODONE HYDROCHLORIDE 20 MG: 20 TABLET, FILM COATED, EXTENDED RELEASE ORAL at 21:39

## 2018-04-20 RX ADMIN — OXYCODONE HYDROCHLORIDE AND ACETAMINOPHEN 1 TABLET: 7.5; 325 TABLET ORAL at 23:23

## 2018-04-20 RX ADMIN — OXYCODONE HYDROCHLORIDE 20 MG: 20 TABLET, FILM COATED, EXTENDED RELEASE ORAL at 09:02

## 2018-04-20 RX ADMIN — Medication 10 ML: at 20:18

## 2018-04-20 RX ADMIN — OXYCODONE HYDROCHLORIDE AND ACETAMINOPHEN 1 TABLET: 7.5; 325 TABLET ORAL at 19:14

## 2018-04-20 RX ADMIN — MAGNESIUM HYDROXIDE 30 ML: 400 SUSPENSION ORAL at 23:36

## 2018-04-20 RX ADMIN — ATORVASTATIN CALCIUM 20 MG: 20 TABLET, FILM COATED ORAL at 09:02

## 2018-04-20 ASSESSMENT — PAIN SCALES - GENERAL
PAINLEVEL_OUTOF10: 8
PAINLEVEL_OUTOF10: 9
PAINLEVEL_OUTOF10: 9
PAINLEVEL_OUTOF10: 8
PAINLEVEL_OUTOF10: 9
PAINLEVEL_OUTOF10: 9

## 2018-04-20 ASSESSMENT — PAIN DESCRIPTION - FREQUENCY
FREQUENCY: CONTINUOUS

## 2018-04-20 ASSESSMENT — PAIN DESCRIPTION - PAIN TYPE
TYPE: SURGICAL PAIN

## 2018-04-20 ASSESSMENT — PAIN DESCRIPTION - LOCATION
LOCATION: BACK

## 2018-04-20 ASSESSMENT — PAIN DESCRIPTION - PROGRESSION
CLINICAL_PROGRESSION: NOT CHANGED
CLINICAL_PROGRESSION: GRADUALLY WORSENING
CLINICAL_PROGRESSION: NOT CHANGED

## 2018-04-20 ASSESSMENT — PAIN DESCRIPTION - ORIENTATION
ORIENTATION: LOWER

## 2018-04-20 ASSESSMENT — PAIN DESCRIPTION - DESCRIPTORS
DESCRIPTORS: ACHING;CONSTANT
DESCRIPTORS: ACHING;CONSTANT
DESCRIPTORS: SHARP;SHOOTING
DESCRIPTORS: SHARP;SHOOTING
DESCRIPTORS: SHARP;SHOOTING;STABBING
DESCRIPTORS: SHARP;SHOOTING;STABBING

## 2018-04-21 ENCOUNTER — NURSE TRIAGE (OUTPATIENT)
Dept: ADMINISTRATIVE | Age: 63
End: 2018-04-21

## 2018-04-21 VITALS
DIASTOLIC BLOOD PRESSURE: 58 MMHG | SYSTOLIC BLOOD PRESSURE: 113 MMHG | HEART RATE: 91 BPM | OXYGEN SATURATION: 99 % | TEMPERATURE: 97.9 F | RESPIRATION RATE: 18 BRPM | BODY MASS INDEX: 29.48 KG/M2 | HEIGHT: 70 IN | WEIGHT: 205.91 LBS

## 2018-04-21 LAB
BASOPHILS # BLD: 0.5 %
BASOPHILS ABSOLUTE: 0 THOU/MM3 (ref 0–0.1)
EOSINOPHIL # BLD: 2.5 %
EOSINOPHILS ABSOLUTE: 0.2 THOU/MM3 (ref 0–0.4)
HCT VFR BLD CALC: 24.1 % (ref 42–52)
HEMOGLOBIN: 8.1 GM/DL (ref 14–18)
LYMPHOCYTES # BLD: 21.5 %
LYMPHOCYTES ABSOLUTE: 1.9 THOU/MM3 (ref 1–4.8)
MCH RBC QN AUTO: 30 PG (ref 27–31)
MCHC RBC AUTO-ENTMCNC: 33.7 GM/DL (ref 33–37)
MCV RBC AUTO: 89 FL (ref 80–94)
MONOCYTES # BLD: 12.6 %
MONOCYTES ABSOLUTE: 1.1 THOU/MM3 (ref 0.4–1.3)
NUCLEATED RED BLOOD CELLS: 0 /100 WBC
PDW BLD-RTO: 14.3 % (ref 11.5–14.5)
PLATELET # BLD: 143 THOU/MM3 (ref 130–400)
PMV BLD AUTO: 8.9 FL (ref 7.4–10.4)
RBC # BLD: 2.7 MILL/MM3 (ref 4.7–6.1)
SEG NEUTROPHILS: 62.9 %
SEGMENTED NEUTROPHILS ABSOLUTE COUNT: 5.6 THOU/MM3 (ref 1.8–7.7)
WBC # BLD: 8.9 THOU/MM3 (ref 4.8–10.8)

## 2018-04-21 PROCEDURE — 36415 COLL VENOUS BLD VENIPUNCTURE: CPT

## 2018-04-21 PROCEDURE — 6370000000 HC RX 637 (ALT 250 FOR IP): Performed by: PHYSICIAN ASSISTANT

## 2018-04-21 PROCEDURE — 85025 COMPLETE CBC W/AUTO DIFF WBC: CPT

## 2018-04-21 PROCEDURE — 6370000000 HC RX 637 (ALT 250 FOR IP): Performed by: ORTHOPAEDIC SURGERY

## 2018-04-21 RX ORDER — SODIUM PHOSPHATE, DIBASIC AND SODIUM PHOSPHATE, MONOBASIC 7; 19 G/133ML; G/133ML
1 ENEMA RECTAL
Status: DISCONTINUED | OUTPATIENT
Start: 2018-04-21 | End: 2018-04-21 | Stop reason: HOSPADM

## 2018-04-21 RX ORDER — OXYCODONE AND ACETAMINOPHEN 7.5; 325 MG/1; MG/1
1 TABLET ORAL EVERY 4 HOURS PRN
Qty: 42 TABLET | Refills: 0 | Status: SHIPPED | OUTPATIENT
Start: 2018-04-21 | End: 2018-05-12

## 2018-04-21 RX ORDER — OXYCODONE HCL 20 MG/1
20 TABLET, FILM COATED, EXTENDED RELEASE ORAL EVERY 12 HOURS SCHEDULED
Qty: 20 TABLET | Refills: 0 | Status: SHIPPED | OUTPATIENT
Start: 2018-04-21 | End: 2018-05-01

## 2018-04-21 RX ORDER — TIZANIDINE 4 MG/1
4 TABLET ORAL EVERY 6 HOURS PRN
Qty: 50 TABLET | Refills: 0 | Status: SHIPPED | OUTPATIENT
Start: 2018-04-21 | End: 2018-05-12

## 2018-04-21 RX ORDER — SENNA PLUS 8.6 MG/1
1 TABLET ORAL 2 TIMES DAILY
Status: DISCONTINUED | OUTPATIENT
Start: 2018-04-21 | End: 2018-04-21 | Stop reason: HOSPADM

## 2018-04-21 RX ORDER — BISACODYL 10 MG
10 SUPPOSITORY, RECTAL RECTAL DAILY PRN
Status: DISCONTINUED | OUTPATIENT
Start: 2018-04-21 | End: 2018-04-21 | Stop reason: HOSPADM

## 2018-04-21 RX ADMIN — OXYCODONE HYDROCHLORIDE AND ACETAMINOPHEN 1 TABLET: 7.5; 325 TABLET ORAL at 03:23

## 2018-04-21 RX ADMIN — PANTOPRAZOLE SODIUM 40 MG: 40 TABLET, DELAYED RELEASE ORAL at 06:01

## 2018-04-21 RX ADMIN — ATORVASTATIN CALCIUM 20 MG: 20 TABLET, FILM COATED ORAL at 08:17

## 2018-04-21 RX ADMIN — OXYCODONE HYDROCHLORIDE AND ACETAMINOPHEN 1 TABLET: 7.5; 325 TABLET ORAL at 08:17

## 2018-04-21 RX ADMIN — TIZANIDINE 4 MG: 4 TABLET ORAL at 08:17

## 2018-04-21 RX ADMIN — OXYCODONE HYDROCHLORIDE 20 MG: 20 TABLET, FILM COATED, EXTENDED RELEASE ORAL at 08:17

## 2018-04-21 RX ADMIN — OXYCODONE HYDROCHLORIDE AND ACETAMINOPHEN 1 TABLET: 7.5; 325 TABLET ORAL at 12:30

## 2018-04-21 RX ADMIN — DOCUSATE SODIUM 100 MG: 100 CAPSULE, LIQUID FILLED ORAL at 08:17

## 2018-04-21 ASSESSMENT — PAIN DESCRIPTION - FREQUENCY: FREQUENCY: CONTINUOUS

## 2018-04-21 ASSESSMENT — PAIN SCALES - GENERAL
PAINLEVEL_OUTOF10: 8

## 2018-04-21 ASSESSMENT — PAIN DESCRIPTION - ORIENTATION: ORIENTATION: LOWER

## 2018-04-21 ASSESSMENT — PAIN DESCRIPTION - LOCATION: LOCATION: BACK

## 2018-04-21 ASSESSMENT — PAIN DESCRIPTION - PAIN TYPE: TYPE: SURGICAL PAIN

## 2018-04-21 ASSESSMENT — PAIN DESCRIPTION - ONSET: ONSET: ON-GOING

## 2018-04-21 ASSESSMENT — PAIN DESCRIPTION - PROGRESSION: CLINICAL_PROGRESSION: NOT CHANGED

## 2018-04-21 ASSESSMENT — PAIN DESCRIPTION - DESCRIPTORS: DESCRIPTORS: SHARP;SHOOTING

## 2018-04-26 PROBLEM — Z01.818 PRE-OP EVALUATION: Status: RESOLVED | Noted: 2018-03-27 | Resolved: 2018-04-26

## 2020-07-20 ENCOUNTER — OFFICE VISIT (OUTPATIENT)
Dept: CARDIOLOGY CLINIC | Age: 65
End: 2020-07-20
Payer: MEDICAID

## 2020-07-20 VITALS
SYSTOLIC BLOOD PRESSURE: 124 MMHG | HEART RATE: 67 BPM | WEIGHT: 197.6 LBS | HEIGHT: 70 IN | BODY MASS INDEX: 28.29 KG/M2 | DIASTOLIC BLOOD PRESSURE: 79 MMHG

## 2020-07-20 PROBLEM — R07.9 CHEST PAIN IN ADULT: Status: ACTIVE | Noted: 2020-07-20

## 2020-07-20 PROCEDURE — 3023F SPIROM DOC REV: CPT | Performed by: INTERNAL MEDICINE

## 2020-07-20 PROCEDURE — G8926 SPIRO NO PERF OR DOC: HCPCS | Performed by: INTERNAL MEDICINE

## 2020-07-20 PROCEDURE — G8419 CALC BMI OUT NRM PARAM NOF/U: HCPCS | Performed by: INTERNAL MEDICINE

## 2020-07-20 PROCEDURE — G8427 DOCREV CUR MEDS BY ELIG CLIN: HCPCS | Performed by: INTERNAL MEDICINE

## 2020-07-20 PROCEDURE — 99214 OFFICE O/P EST MOD 30 MIN: CPT | Performed by: INTERNAL MEDICINE

## 2020-07-20 PROCEDURE — 4004F PT TOBACCO SCREEN RCVD TLK: CPT | Performed by: INTERNAL MEDICINE

## 2020-07-20 PROCEDURE — 3017F COLORECTAL CA SCREEN DOC REV: CPT | Performed by: INTERNAL MEDICINE

## 2020-07-20 PROCEDURE — 93000 ELECTROCARDIOGRAM COMPLETE: CPT | Performed by: INTERNAL MEDICINE

## 2020-07-20 NOTE — PROGRESS NOTES
Chief Complaint   Patient presents with    Follow-up       Had   Lumbar Laminectomy with fusion, Dr. Lara Pen  On 4/2018. Last seen 03/2018    Chest Pain   Patient complains of chest pain. For the last 2 months . Retrosternal, sudden in onset, Symptoms have been unchanged since that time. The patient's pain is intermittent. The patient describes the pain as heaviness, pressure and does not radiate. Patient rates pain as a 5/10 in intensity. Associated symptoms are: sweaty. Aggravating factors are: none. Alleviating factors are: none. CP last 5 min  Mostly happen at night watching TV    Sob on exertion      EKG done today. Denied palpitation, dizziness or edema      EKG done today      Patient Active Problem List   Diagnosis    Cervical spondylosis with myelopathy    COPD (chronic obstructive pulmonary disease) (HCC)    Tobacco abuse    Bradycardia    Low back pain    Headache    Depression    Anxiety    Near syncope    Sinus bradycardia    Dizziness on standing    SOB (shortness of breath) on exertion    S/P cardiac cath 10/18/17-LM-P, LAD MID AND DIST 40%, LCX MID 40%, RCA MID 50%, WUWARL57%, EDP 6 MMHG, EF 60%    Dyslipidemia    Lumbar back pain with radiculopathy affecting left lower extremity    Spinal stenosis of lumbar region with neurogenic claudication    Chest pain in adult       Past Surgical History:   Procedure Laterality Date    BACK SURGERY  11/2015    cervical fusion C3-5?  Dr. Emily Vallejo Bilateral     Dr. Iveth Eng  2014    Kimberly Goel N/A 4/18/2018    LUMBAR LAMINECTOMY WITH PSF L2-S1, PLIF L5-S1 WITH SOLERA 5.5 CAPSTONE AND OPAL [place] performed by Xi Multani MD at [facility] Left 01/2016    Dr. Coronel Officer @ [facility]    UPPER GASTROINTESTINAL ENDOSCOPY         Allergies   Allergen Reactions    Norco [Hydrocodone-Acetaminophen] Hives and Itching        Family History   Problem Relation Age of Onset    High Blood Pressure Mother     High Blood Pressure Father     Heart Disease Father         CAD    Heart Surgery Father 61        CAGB    COPD Sister     Emphysema Sister     Cancer Sister         Throat CA/bone cancer        Social History     Socioeconomic History    Marital status: Single     Spouse name: Not on file    Number of children: Not on file    Years of education: Not on file    Highest education level: Not on file   Occupational History    Not on file   Social Needs    Financial resource strain: Not on file    Food insecurity     Worry: Not on file     Inability: Not on file    Transportation needs     Medical: Not on file     Non-medical: Not on file   Tobacco Use    Smoking status: Current Every Day Smoker     Packs/day: 0.25     Years: 40.00     Pack years: 10.00     Types: Cigarettes    Smokeless tobacco: Never Used   Substance and Sexual Activity    Alcohol use:  Yes     Alcohol/week: 0.0 standard drinks     Comment: 2-3 a couple times a week    Drug use: No    Sexual activity: Not on file   Lifestyle    Physical activity     Days per week: Not on file     Minutes per session: Not on file    Stress: Not on file   Relationships    Social connections     Talks on phone: Not on file     Gets together: Not on file     Attends Spiritism service: Not on file     Active member of club or organization: Not on file     Attends meetings of clubs or organizations: Not on file     Relationship status: Not on file    Intimate partner violence     Fear of current or ex partner: Not on file     Emotionally abused: Not on file     Physically abused: Not on file     Forced sexual activity: Not on file   Other Topics Concern    Not on file   Social History Narrative    Not on file       Current Outpatient Medications   Medication Sig Dispense Refill    atorvastatin (LIPITOR) 20 MG tablet Take 1 tablet by mouth daily 30 tablet 3    omeprazole (PRILOSEC) 40 MG delayed release capsule Take 40 mg by mouth daily      aspirin 81 MG chewable tablet Take 1 tablet by mouth daily 30 tablet 3    albuterol-ipratropium (COMBIVENT)  MCG/ACT inhaler Inhale 2 puffs into the lungs every 6 hours as needed for Wheezing       No current facility-administered medications for this visit. Review of Systems -     General ROS: negative  Psychological ROS: negative  Hematological and Lymphatic ROS: No history of blood clots or bleeding disorder. Respiratory ROS: no cough, shortness of breath, or wheezing  Cardiovascular ROS: no chest pain or dyspnea on exertion  Gastrointestinal ROS: negative  Genito-Urinary ROS: no dysuria, trouble voiding, or hematuria  Musculoskeletal ROS: negative  Neurological ROS: no TIA or stroke symptoms  Dermatological ROS: negative      Blood pressure 124/79, pulse 67, height 5' 10\" (1.778 m), weight 197 lb 9.6 oz (89.6 kg). Physical Examination:    General appearance - alert, well appearing, and in no distress  Mental status - alert, oriented to person, place, and time  Neck - supple, no significant adenopathy, no JVD, or carotid bruits  Chest - clear to auscultation, no wheezes, rales or rhonchi, symmetric air entry  Heart - normal rate, regular rhythm, normal S1, S2, no murmurs, rubs, clicks or gallops  Abdomen - soft, nontender, nondistended, no masses or organomegaly  Neurological - alert, oriented, normal speech, no focal findings or movement disorder noted  Musculoskeletal - no joint tenderness, deformity or swelling  Extremities - peripheral pulses normal, no pedal edema, no clubbing or cyanosis  Skin - normal coloration and turgor, no rashes, no suspicious skin lesions noted    Lab  No results for input(s): CKTOTAL, CKMB, CKMBINDEX, TROPONINI in the last 72 hours.   CBC:   Lab Results   Component Value Date    WBC 8.9 04/21/2018    RBC 2.70 04/21/2018    HGB 8.1 04/21/2018    HCT 24.1 04/21/2018    MCV 89.0 04/21/2018    MCH 30.0 04/21/2018    MCHC 33.7 04/21/2018    RDW 14.3 04/21/2018     04/21/2018    MPV 8.9 04/21/2018     BMP:    Lab Results   Component Value Date     03/29/2018    K 4.3 03/29/2018     03/29/2018    CO2 26 03/29/2018    BUN 15 03/29/2018    LABALBU 4.0 03/29/2018    CREATININE 0.8 03/29/2018    CALCIUM 9.1 03/29/2018    LABGLOM >90 03/29/2018    GLUCOSE 91 03/29/2018     Hepatic Function Panel:    Lab Results   Component Value Date    ALKPHOS 75 03/29/2018    ALT 10 03/29/2018    AST 16 03/29/2018    PROT 7.5 03/29/2018    BILITOT 0.4 03/29/2018    BILIDIR <0.2 03/29/2018    LABALBU 4.0 03/29/2018     Magnesium:  No results found for: MG  Warfarin PT/INR:  No components found for: PTPATWAR, PTINRWAR  HgBA1c:    Lab Results   Component Value Date    LABA1C 5.2 09/05/2017     FLP:    Lab Results   Component Value Date    TRIG 63 03/29/2018    HDL 68 03/29/2018    LDLCALC 75 03/29/2018     TSH:  No results found for: TSH    EKG 3/27/18  Sinus  Rhythm   Low voltage in limb leads. ABNORMAL       Procedure Summary  LM-PATENT,  LAD MID AND DISTAL 40%  LCX MID 40%  RCA PROX 30%, MID 50 % AND DISTAL 40%  Normal LV systolic function. LVEF approximately 60% . LV size - normal.  EDP 6 mmhg  No gradient  Recommendations  Continue with aggressive risk factor modification and medical therapy. Estimated Blood Loss: 10 ml. Complications: No complications. Signatures  Electronically signed by Maciej Li MD (Performing Physician) on 10/18/2017 at 15:37      ekg 7/20/2020  Sinus  Rhythm   WITHIN NORMAL LIMITS      Assessment     Diagnosis Orders   1. Chest pain in adult  Stress test, lexiscan    ECHO Complete 2D W Doppler W Color   2. SOB (shortness of breath) on exertion  Stress test, lexiscan    ECHO Complete 2D W Doppler W Color   3. S/P cardiac cath 10/18/17-LM-P, LAD MID AND DIST 40%, LCX MID 40%, RCA MID 50%, AZEICC09%, EDP 6 MMHG, EF 60%  Stress test, lexiscan   4.  Tobacco abuse  Stress test, lexiscan 5. Chronic obstructive pulmonary disease, unspecified COPD type ([address]  Stress test, lexiscan         Plan   Continue the current treatment and with constant vigilance to changes in symptoms and also any potential side effects. Return for care or seek medical attention immediately if symptoms got worse and/or develop new symptoms.     Chest pain recurrence  Sob on exertion  Hx of copd  Need echo  lexisc nuc- can not walk on threadmill for marked COPD  Cont asa and lipitor    Doing yard work    Hx of Nonobstructive CAD 40 to 50%- 2017  Cont  lipitor 20     Lipid panel and liver function test before next appointment    D/w the pat plan of care in detail    RTC 3 weeks      [facility]

## 2020-10-21 ENCOUNTER — TELEPHONE (OUTPATIENT)
Dept: CARDIOLOGY CLINIC | Age: 65
End: 2020-10-21

## 2020-10-21 NOTE — TELEPHONE ENCOUNTER
Patient rescheduled his stress and echo on 7/30/20 and no showed his testing on 8/10/20. He didn't want to reschedule at this time.   He will call us when he is ready

## 2021-04-20 ENCOUNTER — NURSE TRIAGE (OUTPATIENT)
Dept: OTHER | Facility: CLINIC | Age: 66
End: 2021-04-20

## 2021-04-20 NOTE — TELEPHONE ENCOUNTER
Received call from pre-service center [facility]) Sahara Galeano with Red Flag Complaint. Brief description of triage: see below    Triage indicates for patient to be seen today for high blood pressure reading and HA    Care advice provided, patient verbalizes understanding; denies any other questions or concerns; instructed to call back for any new or worsening symptoms. Writer provided warm transfer to Kamrar at [facility] for appointment scheduling. Attention Provider: Thank you for allowing me to participate in the care of your patient. The patient was connected to triage in response to information provided to the [facility]. Please do not respond through this encounter as the response is not directed to a shared pool. Reason for Disposition   Patient wants to be seen    Answer Assessment - Initial Assessment Questions  1. BLOOD PRESSURE: \"What is the blood pressure? \" \"Did you take at least two measurements 5 minutes apart?\"      160/101    2. ONSET: \"When did you take your blood pressure? \"      \"its been awhile\"    3. HOW: \"How did you obtain the blood pressure? \" (e.g., visiting nurse, automatic home BP monitor)      Home BP monitor    4. HISTORY: \"Do you have a history of high blood pressure? \"      Yes    5. MEDICATIONS: [place] you taking any medications for blood pressure? \" \"Have you missed any doses recently? \"      Lisinopril    6. OTHER SYMPTOMS: \"Do you have any symptoms? \" (e.g., headache, chest pain, blurred vision, difficulty breathing, weakness)      HA, face is red    7. PREGNANCY: \"Is there any chance you are pregnant? \" \"When was your last menstrual period? \"      NA    Protocols used: HIGH BLOOD PRESSURE-ADULT-OH

## 2021-07-18 NOTE — PROGRESS NOTES
lumbar spine were obtained.       FINDINGS: Posterior lumbar fusion has been performed from L2-S1. Metallic pedicle screws and rods are present. A disc spacer device has been inserted at the L5-S1 level. Lumbar vertebra are normally aligned.               Impression   Postop appearance lumbar spine. Past Medical History:   Diagnosis Date    Anxiety     Bradycardia     HR 50 on preop EKG    Cervical spondylosis with myelopathy     COPD (chronic obstructive pulmonary disease) (HCC)     breathing worse in the heat    Depression     Headache(784.0)     Low back pain     was on Xanax from pain clinic - no longer has script    Prolonged emergence from general anesthesia     Snoring     no apnea, BMI 27, neck circ. 15 inches, will wake coughing, no choking, no daytime somnolence    Tobacco abuse        Past Surgical History:   Procedure Laterality Date    BACK SURGERY  11/2015    cervical fusion C3-5?  Dr. Marina Gutiérrez Bilateral     Dr. Ashia Joy  2014    Azalee Fam N/A 4/18/2018    LUMBAR LAMINECTOMY WITH PSF L2-S1, PLIF L5-S1 WITH SOLERA 5.5 CAPSTONE AND OPAL [place] performed by Alok Poe MD at [address] Left 01/2016    Dr. Oriana Lozano @ [facility]    UPPER GASTROINTESTINAL ENDOSCOPY         Family History   Problem Relation Age of Onset    High Blood Pressure Mother     High Blood Pressure Father     Heart Disease Father         CAD    Heart Surgery Father 61        CAGB    COPD Sister     Emphysema Sister     Cancer Sister         Throat CA/bone cancer       Social History     Socioeconomic History    Marital status: Single     Spouse name: Not on file    Number of children: Not on file    Years of education: Not on file    Highest education level: Not on file   Occupational History    Not on file   Tobacco Use    Smoking status: Current Every Day Smoker     Packs/day: 0.25     Years: 40.00     Pack years: 10.00     Types: Cigarettes    Smokeless tobacco: Never Used   Substance and Sexual Activity    Alcohol use: Yes     Alcohol/week: 0.0 standard drinks     Comment: 2-3 a couple times a week    Drug use: No    Sexual activity: Not on file   Other Topics Concern    Not on file   Social History Narrative    Not on file     Social Determinants of Health     Financial Resource Strain:     Difficulty of Paying Living Expenses:    Food Insecurity:     Worried About Running Out of Food in the Last Year:     [address] in the Last Year:    Transportation Needs:     Lack of Transportation (Medical):  Lack of Transportation (Non-Medical):    Physical Activity:     Days of Exercise per Week:     Minutes of Exercise per Session:    Stress:     Feeling of Stress :    Social Connections:     Frequency of Communication with Friends and Family:     Frequency of Social Gatherings with Friends and Family:     Attends Adventist Services:     Active Member of Clubs or Organizations:     Attends Club or Organization Meetings:     Marital Status:    Intimate Partner Violence:     Fear of Current or Ex-Partner:     Emotionally Abused:     Physically Abused:     Sexually Abused:        Medications & Allergies:   Current Outpatient Medications   Medication Instructions    albuterol-ipratropium (COMBIVENT)  MCG/ACT inhaler 2 puffs, Inhalation, EVERY 6 HOURS PRN    aspirin 81 mg, Oral, DAILY    atorvastatin (LIPITOR) 20 mg, Oral, DAILY    gabapentin (NEURONTIN) 800 MG tablet TAKE 1 TABLET BY MOUTH THREE TIMES A DAY    meloxicam (MOBIC) 15 mg, Oral, DAILY    omeprazole (PRILOSEC) 40 mg, Oral, DAILY    tiZANidine (ZANAFLEX) 4 MG tablet TAKE 1 TABLET BY MOUTH 2 TIMES DAILY AS NEEDED FOR MUSCLE SPASMS.        Allergies   Allergen Reactions    Norco [Hydrocodone-Acetaminophen] Hives and Itching       Review of Systems:   Constitutional: negative for weight changes or fevers  Genitourinary: negative for bowel/bladder incontinence   Musculoskeletal: positive for low back and right buttocks pain  Neurological: negative for any leg weakness or numbness/tingling  Behavioral/Psych: positive for anxiety/depression   All other systems reviewed and are negative    Objective:     Vitals:    07/19/21 0755   BP: 126/80       Constitutional: Pleasant, no acute distress   Head: Normocephalic, atraumatic   Eyes: Conjunctivae normal   Neck: Supple, symmetrical   Lungs: Normal respiratory effort, non-labored breathing   Cardiovascular: Limbs warm and well perfused   Abdomen: Non-protruded   Musculoskeletal: Muscle bulk symmetric, no atrophy, no gross deformities   · Lumbar Spine: ROM reduced in extension. Lumbar paraspinals tender bilaterally. Seated SLR equivocal on right. ADVID positive right. GAENSLEN positive right. SI joint distraction test positive on right. Positive facet loading bilaterally. Right SI joints tender to palpation. Neurological: Cranial nerves II-XII grossly intact. · Gait - Slightly antalgic gait. Ambulates without assistive device. · Motor: 5/5 muscle strength in bilateral hip flexion, knee flexion, knee extension, ankle dorsiflexion, and ankle plantar flexion   · Sensory: LT sensation intact in lower limbs   · Reflexes: 1+ symmetrical in bilateral achilles, 1+ bilateral patellar, negative ankle clonus  Skin: Healed midline surgical incision scar in low back  Psychological: Cooperative, no exaggerated pain behaviors     Assessment:    Diagnosis Orders   1. Chronic right SI joint pain  CHG FLUOR NEEDLE/CATH SPINE/PARASPINAL DX/THER ADDON    NE INJECT SI JOINT ARTHRGRPHY&/ANES/STEROID W/IMAGE   2. Other chronic pain  oxyCODONE-acetaminophen (PERCOCET) 5-325 MG per tablet   3. Failed back surgical syndrome     4.  Myofascial pain         Vasmhi Suh is a 63 y.o.male presenting to the pain clinic for evaluation of chronic low back/right buttocks pain in the setting of previous L2-S1 decompression/fusion in 2018. His clinical history and physical examination are consistent with severe right SI joint dysfunction/pain. I have set him up for a right SI joint injection under fluoroscopy. In addition, due to failed multiple adjuvant medications I have started the patient on low-dose Percocet for breakthrough pain. We may potentially investigate the use of a spinal cord stimulation in the setting of failed back surgical syndrome. Plan: The following treatment recommendations and plan were discussed in detail with Praveen Thomas. Imaging:   I have reviewed patients imaging of XR L-spine and results were discussed with patient today. Analgesics:   With continued chronic pain, failed response to multiple adjuvant agents, and failed back surgical syndrome, I start the patient on low-dose Percocet 5 mg every 12 hours as needed for severe pain (pain greater than 7/10). Patient is advised take this medication as prescribed as taking more than prescribed can lead to development of tolerance, physical dependence, addiction. Patient should store and locked medication in a safe and secure location. OARRS reviewed and is appropriate. Pain contract signed. Patient is taking Acetaminophen. Patient informed that the maximum amount of acetaminophen taken on a regular basis should only be 4000 mg per day. Patient is taking Meloxicam. Patient is advised to take as prescribed and not take on an empty stomach. Adjuvants: In light of the presence of a neuropathic component of pain, we will continue gabapentin 800 mg 3 times a day. Interventions: With examination consistent with rightsacroiliac dysfunction/pain, we will proceed with a right sacroiliac joint injection. The risks and benefits were discussed in detail with the patient. Patient wants to proceed with the injection.     Anticoagulation/NPO Recommendations:   Patient does not need to hold any medications prior to the procedure. Patient will need to be NPO x 8 hours prior to the procedure. We will start an IV prior to the procedure     Multidisciplinary Pain Management:   In the presence of complex, chronic, and multi-factorial pain, the importance of a multidisciplinary approach to pain management in the patients management regimen was emphasized and discussed in great detail.    PHYSICAL THERAPY: We will work on some home stretching exercises for SI joint dysfunction/hip pain at our follow-up    Referrals:  None    Prescriptions Written This Visit:   Percocet 5 mg (#60, 0 refills)    Follow-up: For R SI joint injection      Leti Pal DO  Interventional Pain Management/PM&R   [place]

## 2021-07-19 ENCOUNTER — OFFICE VISIT (OUTPATIENT)
Dept: PHYSICAL MEDICINE AND REHAB | Age: 66
End: 2021-07-19
Payer: MEDICARE

## 2021-07-19 VITALS
DIASTOLIC BLOOD PRESSURE: 80 MMHG | BODY MASS INDEX: 28.77 KG/M2 | HEIGHT: 70 IN | SYSTOLIC BLOOD PRESSURE: 126 MMHG | WEIGHT: 201 LBS

## 2021-07-19 DIAGNOSIS — M53.3 CHRONIC RIGHT SI JOINT PAIN: Primary | ICD-10-CM

## 2021-07-19 DIAGNOSIS — M96.1 FAILED BACK SURGICAL SYNDROME: ICD-10-CM

## 2021-07-19 DIAGNOSIS — G89.29 OTHER CHRONIC PAIN: ICD-10-CM

## 2021-07-19 DIAGNOSIS — M79.18 MYOFASCIAL PAIN: ICD-10-CM

## 2021-07-19 DIAGNOSIS — G89.29 CHRONIC RIGHT SI JOINT PAIN: Primary | ICD-10-CM

## 2021-07-19 PROCEDURE — G8427 DOCREV CUR MEDS BY ELIG CLIN: HCPCS | Performed by: ANESTHESIOLOGY

## 2021-07-19 PROCEDURE — 3017F COLORECTAL CA SCREEN DOC REV: CPT | Performed by: ANESTHESIOLOGY

## 2021-07-19 PROCEDURE — 99204 OFFICE O/P NEW MOD 45 MIN: CPT | Performed by: ANESTHESIOLOGY

## 2021-07-19 PROCEDURE — 4004F PT TOBACCO SCREEN RCVD TLK: CPT | Performed by: ANESTHESIOLOGY

## 2021-07-19 PROCEDURE — G8419 CALC BMI OUT NRM PARAM NOF/U: HCPCS | Performed by: ANESTHESIOLOGY

## 2021-07-19 RX ORDER — MELOXICAM 15 MG/1
15 TABLET ORAL DAILY
Status: ON HOLD | COMMUNITY
Start: 2020-11-24 | End: 2021-08-03

## 2021-07-19 RX ORDER — GABAPENTIN 800 MG/1
TABLET ORAL
COMMUNITY
Start: 2021-07-13 | End: 2021-08-10 | Stop reason: SDUPTHER

## 2021-07-19 RX ORDER — OXYCODONE HYDROCHLORIDE AND ACETAMINOPHEN 5; 325 MG/1; MG/1
1 TABLET ORAL 2 TIMES DAILY PRN
Qty: 60 TABLET | Refills: 0 | Status: SHIPPED | OUTPATIENT
Start: 2021-07-19 | End: 2021-08-17 | Stop reason: SDUPTHER

## 2021-07-19 RX ORDER — TIZANIDINE 4 MG/1
TABLET ORAL
COMMUNITY
Start: 2021-07-06 | End: 2021-08-10 | Stop reason: SDUPTHER

## 2021-07-19 NOTE — PATIENT INSTRUCTIONS
The following treatment recommendations and plan were discussed in detail with Haylee Ni. Imaging:   I have reviewed patients imaging of XR L-spine and results were discussed with patient today. Analgesics:   With continued chronic pain, failed response to multiple adjuvant agents, and failed back surgical syndrome, I start the patient on low-dose Percocet 5 mg every 12 hours as needed for severe pain (pain greater than 7/10). Patient is advised take this medication as prescribed as taking more than prescribed can lead to development of tolerance, physical dependence, addiction. Patient should store and locked medication in a safe and secure location. OARRS reviewed and is appropriate. Pain contract signed. Patient is taking Acetaminophen. Patient informed that the maximum amount of acetaminophen taken on a regular basis should only be 4000 mg per day. Patient is taking Meloxicam. Patient is advised to take as prescribed and not take on an empty stomach. Adjuvants: In light of the presence of a neuropathic component of pain, we will continue gabapentin 800 mg 3 times a day. Interventions: With examination consistent with rightsacroiliac dysfunction/pain, we will proceed with a right sacroiliac joint injection. The risks and benefits were discussed in detail with the patient. Patient wants to proceed with the injection. Anticoagulation/NPO Recommendations:   Patient does not need to hold any medications prior to the procedure. Patient will need to be NPO x 8 hours prior to the procedure. We will start an IV prior to the procedure     Multidisciplinary Pain Management:   In the presence of complex, chronic, and multi-factorial pain, the importance of a multidisciplinary approach to pain management in the patients management regimen was emphasized and discussed in great detail.    PHYSICAL THERAPY: We will work on some home stretching exercises for SI joint dysfunction/hip pain at our follow-up    Referrals:  None    Prescriptions Written This Visit:   Percocet 5 mg (#60, 0 refills)    Follow-up: For R SI joint injection

## 2021-07-20 ENCOUNTER — TELEPHONE (OUTPATIENT)
Dept: PHYSICAL MEDICINE AND REHAB | Age: 66
End: 2021-07-20

## 2021-08-02 NOTE — H&P
post procedure directions / restrictions. All other questions and concerns addressed before patient discharge in ambulatory fashion. Chronic Pain/PM&R Clinic Note     Encounter Date: 7/19/21    Subjective:   Chief Complaint:   No chief complaint on file. History of Present Illness:   Morris Sigala is a 61 y.o. male seen in the clinic initially on 07/19/21 upon request from [address], MD for his history of chronic low back pain. He has a medical history of previous L2-S1 lumbar decompression/fusion by Dr. Rekha Downs in 2018, COPD, and anxiety/depression. He has been dealing with chronic low back and right-sided buttocks pain since his back surgery in 2018. He describes his pain to be a constant 9/10 stabbing, pins/needles, burning pain. States that the buttocks pain will radiate to the lateral aspect of the hip and down the lateral aspect and posterior aspect of the thigh. He states his pain is worse with any activity where he is upright walking. His pain is improved with rest and medications. He feels like the right leg is weaker than the left but denies any associated numbness/tingling, saddle anesthesia, bowel/bladder incontinence. He states that he has seen previous pain management (Dr. Roslyn German). He states that he had a couple different injections including SI joint injection, lumbar epidural steroid injections, and facet injections. He feels like ejections were not helping him out much. History of Interventions:   Surgery: Previous L2-S1 decompression/fusion in 2018 (Dr. Rekha Downs)  Injections: Multiple in past (Dr. Roslyn German)    Current Treatment Medications:   Gabapentin 800 mg TID  Tizanidine 4 mg PRN  Meloxciam 15 mg daily    Historical Treatment Medications:   Norco - hives, itching  Tramadol - ineffective  Naproxen - ineffective   Lyrica - ineffective       Imaging:  XR L-spine (4/18/18)    LUMBAR SPINE 2 VIEWS:       CLINICAL INFORMATION: Back pain.  Lumbar fusion.       COMPARISON: Earlier film same date, 1135 hours       TECHNIQUE: Standard AP and crossfire lateral views of lumbar spine were obtained.       FINDINGS: Posterior lumbar fusion has been performed from L2-S1. Metallic pedicle screws and rods are present. A disc spacer device has been inserted at the L5-S1 level. Lumbar vertebra are normally aligned.               Impression   Postop appearance lumbar spine. Past Medical History:   Diagnosis Date    Anxiety     Bradycardia     HR 50 on preop EKG    Cervical spondylosis with myelopathy     COPD (chronic obstructive pulmonary disease) (HCC)     breathing worse in the heat    Depression     Headache(784.0)     Low back pain     was on Xanax from pain clinic - no longer has script    Prolonged emergence from general anesthesia     Snoring     no apnea, BMI 27, neck circ. 15 inches, will wake coughing, no choking, no daytime somnolence    Tobacco abuse        Past Surgical History:   Procedure Laterality Date    BACK SURGERY  11/2015    cervical fusion C3-5?  Dr. Domi Branham Bilateral     Dr. Stallings Finely  2014    Jaswant Laird N/A 4/18/2018    LUMBAR LAMINECTOMY WITH PSF L2-S1, PLIF L5-S1 WITH SOLERA 5.5 CAPSTONE AND OPAL [place] performed by Dionte Lopez MD at [address] [place] [address] Left 01/2016    [facility]. Mark Moreno [facility]    UPPER GASTROINTESTINAL ENDOSCOPY         Family History   Problem Relation Age of Onset    High Blood Pressure Mother     High Blood Pressure Father     Heart Disease Father         CAD    Heart Surgery Father 61        CAGB    COPD Sister     Emphysema Sister     Cancer Sister         Throat CA/bone cancer       Social History     Socioeconomic History    Marital status: Single     Spouse name: Not on file    Number of children: Not on file    Years of education: Not on file    Highest education level: Not on file   Occupational History    Not on file   Tobacco Use    Smoking status: Current Every Day Smoker     Packs/day: 0.25     Years: 40.00     Pack years: 10.00     Types: Cigarettes    Smokeless tobacco: Never Used   Substance and Sexual Activity    Alcohol use: Yes     Alcohol/week: 0.0 standard drinks     Comment: 2-3 a couple times a week    Drug use: No    Sexual activity: Not on file   Other Topics Concern    Not on file   Social History Narrative    Not on file     Social Determinants of Health     Financial Resource Strain:     Difficulty of Paying Living Expenses:    Food Insecurity:     Worried About Running Out of Food in the Last Year:     [address] in the Last Year:    Transportation Needs:     Lack of Transportation (Medical):      Lack of Transportation (Non-Medical):    Physical Activity:     Days of Exercise per Week:     Minutes of Exercise per Session:    Stress:     Feeling of Stress :    Social Connections:     Frequency of Communication with Friends and Family:     Frequency of Social Gatherings with Friends and Family:     Attends Scientologist Services:     Active Member of Clubs or Organizations:     Attends Club or Organization Meetings:     Marital Status:    Intimate Partner Violence:     Fear of Current or Ex-Partner:     Emotionally Abused:     Physically Abused:     Sexually Abused:        Medications & Allergies:   Current Outpatient Medications   Medication Instructions    albuterol-ipratropium (COMBIVENT)  MCG/ACT inhaler 2 puffs, Inhalation, EVERY 6 HOURS PRN    aspirin 81 mg, Oral, DAILY    atorvastatin (LIPITOR) 20 mg, Oral, DAILY    gabapentin (NEURONTIN) 800 MG tablet TAKE 1 TABLET BY MOUTH THREE TIMES A DAY    meloxicam (MOBIC) 15 mg, Oral, DAILY    omeprazole (PRILOSEC) 40 mg, Oral, DAILY    oxyCODONE-acetaminophen (PERCOCET) 5-325 MG per tablet 1 tablet, Oral, 2 TIMES DAILY PRN    tiZANidine (ZANAFLEX) 4 MG tablet TAKE 1 TABLET BY MOUTH 2 TIMES DAILY AS NEEDED FOR MUSCLE SPASMS. Allergies   Allergen Reactions    Norco [Hydrocodone-Acetaminophen] Hives and Itching       Review of Systems:   Constitutional: negative for weight changes or fevers  Genitourinary: negative for bowel/bladder incontinence   Musculoskeletal: positive for low back and right buttocks pain  Neurological: negative for any leg weakness or numbness/tingling  Behavioral/Psych: positive for anxiety/depression   All other systems reviewed and are negative    Objective: There were no vitals filed for this visit. Constitutional: Pleasant, no acute distress   Head: Normocephalic, atraumatic   Eyes: Conjunctivae normal   Neck: Supple, symmetrical   Lungs: Normal respiratory effort, non-labored breathing   Cardiovascular: Limbs warm and well perfused   Abdomen: Non-protruded   Musculoskeletal: Muscle bulk symmetric, no atrophy, no gross deformities   · Lumbar Spine: ROM reduced in extension. Lumbar paraspinals tender bilaterally. Seated SLR equivocal on right. DAVID positive right. GAENSLEN positive right. SI joint distraction test positive on right. Positive facet loading bilaterally. Right SI joints tender to palpation. Neurological: Cranial nerves II-XII grossly intact. · Gait - Slightly antalgic gait. Ambulates without assistive device. · Motor: 5/5 muscle strength in bilateral hip flexion, knee flexion, knee extension, ankle dorsiflexion, and ankle plantar flexion   · Sensory: LT sensation intact in lower limbs   · Reflexes: 1+ symmetrical in bilateral achilles, 1+ bilateral patellar, negative ankle clonus  Skin: Healed midline surgical incision scar in low back  Psychological: Cooperative, no exaggerated pain behaviors     Assessment:    Diagnosis Orders   1. Chronic right SI joint pain  CHG FLUOR NEEDLE/CATH SPINE/PARASPINAL DX/THER ADDON    DE INJECT SI JOINT ARTHRGRPHY&/ANES/STEROID W/IMAGE   2. Other chronic pain  oxyCODONE-acetaminophen (PERCOCET) 5-325 MG per tablet   3.  Failed back surgical syndrome     4. Myofascial pain         Sami Em is a 61 y.o.male presenting to the pain clinic for evaluation of chronic low back/right buttocks pain in the setting of previous L2-S1 decompression/fusion in 2018. His clinical history and physical examination are consistent with severe right SI joint dysfunction/pain. I have set him up for a right SI joint injection under fluoroscopy. In addition, due to failed multiple adjuvant medications I have started the patient on low-dose Percocet for breakthrough pain. We may potentially investigate the use of a spinal cord stimulation in the setting of failed back surgical syndrome. Plan: The following treatment recommendations and plan were discussed in detail with Sami Em. Imaging:   I have reviewed patients imaging of XR L-spine and results were discussed with patient today. Analgesics:   With continued chronic pain, failed response to multiple adjuvant agents, and failed back surgical syndrome, I start the patient on low-dose Percocet 5 mg every 12 hours as needed for severe pain (pain greater than 7/10). Patient is advised take this medication as prescribed as taking more than prescribed can lead to development of tolerance, physical dependence, addiction. Patient should store and locked medication in a safe and secure location. OARRS reviewed and is appropriate. Pain contract signed. Patient is taking Acetaminophen. Patient informed that the maximum amount of acetaminophen taken on a regular basis should only be 4000 mg per day. Patient is taking Meloxicam. Patient is advised to take as prescribed and not take on an empty stomach. Adjuvants: In light of the presence of a neuropathic component of pain, we will continue gabapentin 800 mg 3 times a day. Interventions: With examination consistent with rightsacroiliac dysfunction/pain, we will proceed with a right sacroiliac joint injection.   The risks and benefits were discussed in detail with the patient. Patient wants to proceed with the injection. Anticoagulation/NPO Recommendations:   Patient does not need to hold any medications prior to the procedure. Patient will need to be NPO x 8 hours prior to the procedure. We will start an IV prior to the procedure     Multidisciplinary Pain Management:   In the presence of complex, chronic, and multi-factorial pain, the importance of a multidisciplinary approach to pain management in the patients management regimen was emphasized and discussed in great detail.    PHYSICAL THERAPY: We will work on some home stretching exercises for SI joint dysfunction/hip pain at our follow-up    Referrals:  None    Prescriptions Written This Visit:   Percocet 5 mg (#60, 0 refills)    Follow-up: For R SI joint injection      Omar Peterson, DO  Interventional Pain Management/PM&R   [place]

## 2021-08-03 ENCOUNTER — APPOINTMENT (OUTPATIENT)
Dept: GENERAL RADIOLOGY | Age: 66
End: 2021-08-03
Attending: ANESTHESIOLOGY
Payer: MEDICARE

## 2021-08-03 ENCOUNTER — HOSPITAL ENCOUNTER (OUTPATIENT)
Age: 66
Setting detail: OUTPATIENT SURGERY
Discharge: HOME | End: 2021-08-03
Attending: ANESTHESIOLOGY | Admitting: ANESTHESIOLOGY
Payer: MEDICARE

## 2021-08-03 VITALS
DIASTOLIC BLOOD PRESSURE: 67 MMHG | BODY MASS INDEX: 29.35 KG/M2 | HEIGHT: 70 IN | RESPIRATION RATE: 16 BRPM | TEMPERATURE: 97.1 F | HEART RATE: 59 BPM | WEIGHT: 205 LBS | OXYGEN SATURATION: 98 % | SYSTOLIC BLOOD PRESSURE: 115 MMHG

## 2021-08-03 PROCEDURE — 3600000054 HC PAIN LEVEL 3 BASE: Performed by: ANESTHESIOLOGY

## 2021-08-03 PROCEDURE — 7100000011 HC PHASE II RECOVERY - ADDTL 15 MIN: Performed by: ANESTHESIOLOGY

## 2021-08-03 PROCEDURE — 3209999900 FLUORO FOR SURGICAL PROCEDURES

## 2021-08-03 PROCEDURE — 7100000010 HC PHASE II RECOVERY - FIRST 15 MIN: Performed by: ANESTHESIOLOGY

## 2021-08-03 PROCEDURE — 6360000002 HC RX W HCPCS: Performed by: ANESTHESIOLOGY

## 2021-08-03 PROCEDURE — 99152 MOD SED SAME PHYS/QHP 5/>YRS: CPT | Performed by: ANESTHESIOLOGY

## 2021-08-03 PROCEDURE — 2709999900 HC NON-CHARGEABLE SUPPLY: Performed by: ANESTHESIOLOGY

## 2021-08-03 PROCEDURE — 27096 INJECT SACROILIAC JOINT: CPT | Performed by: ANESTHESIOLOGY

## 2021-08-03 PROCEDURE — 2500000003 HC RX 250 WO HCPCS: Performed by: ANESTHESIOLOGY

## 2021-08-03 RX ORDER — LIDOCAINE HYDROCHLORIDE 10 MG/ML
INJECTION, SOLUTION EPIDURAL; INFILTRATION; INTRACAUDAL; PERINEURAL PRN
Status: DISCONTINUED | OUTPATIENT
Start: 2021-08-03 | End: 2021-08-03 | Stop reason: ALTCHOICE

## 2021-08-03 RX ORDER — METHYLPREDNISOLONE ACETATE 40 MG/ML
INJECTION, SUSPENSION INTRA-ARTICULAR; INTRALESIONAL; INTRAMUSCULAR; SOFT TISSUE PRN
Status: DISCONTINUED | OUTPATIENT
Start: 2021-08-03 | End: 2021-08-03 | Stop reason: ALTCHOICE

## 2021-08-03 RX ORDER — MIDAZOLAM HYDROCHLORIDE 1 MG/ML
INJECTION INTRAMUSCULAR; INTRAVENOUS PRN
Status: DISCONTINUED | OUTPATIENT
Start: 2021-08-03 | End: 2021-08-03 | Stop reason: ALTCHOICE

## 2021-08-03 RX ORDER — BUPIVACAINE HYDROCHLORIDE 5 MG/ML
INJECTION, SOLUTION PERINEURAL PRN
Status: DISCONTINUED | OUTPATIENT
Start: 2021-08-03 | End: 2021-08-03 | Stop reason: ALTCHOICE

## 2021-08-03 RX ORDER — FENTANYL CITRATE 50 UG/ML
INJECTION, SOLUTION INTRAMUSCULAR; INTRAVENOUS PRN
Status: DISCONTINUED | OUTPATIENT
Start: 2021-08-03 | End: 2021-08-03 | Stop reason: ALTCHOICE

## 2021-08-03 ASSESSMENT — PAIN DESCRIPTION - PAIN TYPE: TYPE: CHRONIC PAIN

## 2021-08-03 ASSESSMENT — PAIN SCALES - GENERAL
PAINLEVEL_OUTOF10: 10
PAINLEVEL_OUTOF10: 0

## 2021-08-03 ASSESSMENT — PAIN DESCRIPTION - ORIENTATION: ORIENTATION: LOWER

## 2021-08-03 ASSESSMENT — PAIN DESCRIPTION - LOCATION: LOCATION: BACK

## 2021-08-03 NOTE — POST SEDATION
[address] [id number]  Sedation/Analgesia Post Sedation Record    Pt Name: Alfreda Zamudio  MRN: [id number]  YOB: 1955  Procedure Performed By: Ambar Wan DO  Primary Care Physician: Ирина Boateng MD    POST-PROCEDURE    Physicians/Assistants: Ambar Wan DO  Procedure Performed: See Procedure Note   Sedation/Anesthesia: Versed and Fentanyl (See procedure note for amount and duration)  Estimated Blood Loss:     0  ml  Specimens Removed: None        Complications: None           Linden aCro DO  Electronically signed 8/3/2021 at 1:38 PM

## 2021-08-03 NOTE — PROGRESS NOTES
1154: Patient to phase 2 recovery room via cart. Patient is awake and alert. Report received from surgical RN, Nichol Love. Patient's vitals obtained, see charting. 1156: Patient is denying pain and nausea at this time. IV is INT'd in his hand. 1158: Offered drink and snack provided to the patient. Bed is in the lowest position and call light is within reach. 1212: IV removed at this time- no complications and dressing applied. Patient stating he understood his discharge instructions given in pre op. 1217: Patient ambulated to the car and discharged home in stable condition with his wife.

## 2021-08-03 NOTE — PRE SEDATION
[address] [address]  Pre-Sedation/Analgesia History & Physical    Pt Name: Maddie King  MRN: [id number]  YOB: 1955  Provider Performing Procedure: Vineet Elizabeth DO   Primary Care Physician: Briana Richey MD      MEDICAL HISTORY       has a past medical history of Anxiety, Arthritis, Bradycardia, Cervical spondylosis with myelopathy, COPD (chronic obstructive pulmonary disease) ([facility] [address] Depression, GERD (gastroesophageal reflux disease), Headache(784.0), Hyperlipidemia, Low back pain, Prolonged emergence from general anesthesia, Snoring, and Tobacco abuse. SURGICAL HISTORY   has a past surgical history that includes Colonoscopy (2014); Rotator cuff repair (Left, 01/2016); back surgery (11/2015); Upper gastrointestinal endoscopy; Cataract removal with implant (Bilateral); and pr spine surgery procedure unlisted (N/A, 4/18/2018). ALLERGIES   Allergies as of 07/19/2021 - Fully Reviewed 07/19/2021   Allergen Reaction Noted    Norco [hydrocodone-acetaminophen] Hives and Itching 04/18/2018       MEDICATIONS   Prior to Admission medications    Medication Sig Start Date End Date Taking? Authorizing Provider   gabapentin (NEURONTIN) 800 MG tablet TAKE 1 TABLET BY MOUTH THREE TIMES A DAY 7/13/21  Yes Historical Provider, MD   tiZANidine (ZANAFLEX) 4 MG tablet TAKE 1 TABLET BY MOUTH 2 TIMES DAILY AS NEEDED FOR MUSCLE SPASMS. 7/6/21  Yes Historical Provider, MD   oxyCODONE-acetaminophen (PERCOCET) 5-325 MG per tablet Take 1 tablet by mouth 2 times daily as needed for Pain for up to 30 days.  7/19/21 8/18/21 Yes Linden Trujillo DO   aspirin 81 MG chewable tablet Take 1 tablet by mouth daily 9/6/17  Yes Javier Felder MD   albuterol-ipratropium [facility])  MCG/ACT inhaler Inhale 2 puffs into the lungs every 6 hours as needed for Wheezing   Yes Historical Provider, MD   omeprazole (PRILOSEC) 40 MG delayed release capsule Take 40 mg by mouth daily    Historical Provider, MD PHYSICAL:   Vitals:    08/03/21 1154   BP: 115/67   Pulse: 59   Resp: 16   Temp: 97.1 °F (36.2 °C)   SpO2: 98%     PLANNED PROCEDURE   See procedure note  SEDATION  Planned agent: Versed and Fentanyl  ASA Classification: 2  Class 1: A normal healthy patient  Class 2: Pt with mild to moderate systemic disease  Class 3: Severe systemic disease or disturbance  Class 4: Severe systemic disorders that are already life threatening. Class 5: Moribund pt with little chances of survival, for more than 24 hours. Mallampati I Airway Classification: 2    1. Pre-procedure diagnostic studies complete and results available. 2. Previous sedation/anesthesia experiences assessed. 3. The patient is an appropriate candidate to undergo the planned procedure sedation and anesthesia. (Refer to nursing sedation/analgesia documentation record)  4. Formulation and discussion of sedation/procedure plan, risks, and expectations with patient and/or responsible adult completed. 5. Patient examined immediately prior to the procedure.  (Refer to nursing sedation/analgesia documentation record)    Jon Dent DO  Electronically signed 8/3/2021 at 1:37 PM

## 2021-08-06 ENCOUNTER — TELEPHONE (OUTPATIENT)
Dept: PHYSICAL MEDICINE AND REHAB | Age: 66
End: 2021-08-06

## 2021-08-06 NOTE — TELEPHONE ENCOUNTER
Patient called stating since he received SI injections on 8/3/2021, is having pain in legs so bad he can hardly walk. Asking for increase in percocet dose.     Please advise

## 2021-08-10 DIAGNOSIS — M48.062 SPINAL STENOSIS OF LUMBAR REGION WITH NEUROGENIC CLAUDICATION: ICD-10-CM

## 2021-08-10 DIAGNOSIS — M47.12 CERVICAL SPONDYLOSIS WITH MYELOPATHY: ICD-10-CM

## 2021-08-10 DIAGNOSIS — M54.16 LUMBAR BACK PAIN WITH RADICULOPATHY AFFECTING LEFT LOWER EXTREMITY: ICD-10-CM

## 2021-08-10 DIAGNOSIS — M62.838 SPASM OF MUSCLE: Primary | ICD-10-CM

## 2021-08-10 RX ORDER — TIZANIDINE 4 MG/1
4 TABLET ORAL 2 TIMES DAILY PRN
Qty: 60 TABLET | Refills: 0 | Status: SHIPPED | OUTPATIENT
Start: 2021-08-12 | End: 2021-08-16

## 2021-08-10 RX ORDER — GABAPENTIN 800 MG/1
TABLET ORAL
Qty: 90 TABLET | Refills: 0 | Status: SHIPPED | OUTPATIENT
Start: 2021-08-12 | End: 2021-09-13 | Stop reason: SDUPTHER

## 2021-08-10 NOTE — TELEPHONE ENCOUNTER
OARRS reviewed. UDS: + for  Xanax flexeril oxycodone consistent. Last seen: 7/19/2021.  Follow-up:   Future Appointments   Date Time Provider Elizabeth Nair   9/13/2021  4:10 PM DO ADEEL Dietz [facility] Pain [facility] - [address] [facility]

## 2021-08-10 NOTE — TELEPHONE ENCOUNTER
Bassem Noel called requesting a refill on the following medications:  Requested Prescriptions     Pending Prescriptions Disp Refills    gabapentin (NEURONTIN) 800 MG tablet 90 tablet     tiZANidine (ZANAFLEX) 4 MG tablet       Pharmacy verified:  [facility]  . pv      Date of last visit: 7/19/2021  Date of next visit (if applicable): 0/35/6941

## 2021-08-13 DIAGNOSIS — M62.838 SPASM OF MUSCLE: ICD-10-CM

## 2021-08-16 RX ORDER — TIZANIDINE 4 MG/1
4 TABLET ORAL 2 TIMES DAILY
Qty: 60 TABLET | Refills: 0 | Status: SHIPPED | OUTPATIENT
Start: 2021-08-16 | End: 2021-09-13 | Stop reason: SDUPTHER

## 2021-08-16 NOTE — TELEPHONE ENCOUNTER
OARRS reviewed. No UDS  Last seen: 7/19/2021.  Follow-up:   Future Appointments   Date Time Provider Elizabeth Nair   9/13/2021  4:10 PM Derik Sherryle Blue, DO N [facility] Pain [facility] - [address] [facility]

## 2021-08-17 DIAGNOSIS — G89.29 OTHER CHRONIC PAIN: ICD-10-CM

## 2021-08-17 RX ORDER — OXYCODONE HYDROCHLORIDE AND ACETAMINOPHEN 5; 325 MG/1; MG/1
1 TABLET ORAL 2 TIMES DAILY PRN
Qty: 60 TABLET | Refills: 0 | Status: SHIPPED | OUTPATIENT
Start: 2021-08-18 | End: 2021-09-13

## 2021-08-17 NOTE — TELEPHONE ENCOUNTER
Jahaira Ni called requesting a refill on the following medications:  Requested Prescriptions     Pending Prescriptions Disp Refills    oxyCODONE-acetaminophen (PERCOCET) 5-325 MG per tablet 60 tablet 0     Sig: Take 1 tablet by mouth 2 times daily as needed for Pain for up to 30 days.      Ph[facility] [place], [place]    Date of last visit: 07/19/2021  Date of next visit (if applicable): 5/73/1605

## 2021-09-13 ENCOUNTER — OFFICE VISIT (OUTPATIENT)
Dept: PHYSICAL MEDICINE AND REHAB | Age: 66
End: 2021-09-13
Payer: MEDICARE

## 2021-09-13 VITALS
HEIGHT: 70 IN | SYSTOLIC BLOOD PRESSURE: 122 MMHG | WEIGHT: 205 LBS | BODY MASS INDEX: 29.35 KG/M2 | DIASTOLIC BLOOD PRESSURE: 70 MMHG

## 2021-09-13 DIAGNOSIS — M62.838 SPASM OF MUSCLE: ICD-10-CM

## 2021-09-13 DIAGNOSIS — M54.16 LUMBAR BACK PAIN WITH RADICULOPATHY AFFECTING LEFT LOWER EXTREMITY: ICD-10-CM

## 2021-09-13 DIAGNOSIS — G89.29 OTHER CHRONIC PAIN: ICD-10-CM

## 2021-09-13 DIAGNOSIS — M47.12 CERVICAL SPONDYLOSIS WITH MYELOPATHY: ICD-10-CM

## 2021-09-13 DIAGNOSIS — M48.062 SPINAL STENOSIS OF LUMBAR REGION WITH NEUROGENIC CLAUDICATION: ICD-10-CM

## 2021-09-13 DIAGNOSIS — M25.551 RIGHT HIP PAIN: Primary | ICD-10-CM

## 2021-09-13 PROCEDURE — 3017F COLORECTAL CA SCREEN DOC REV: CPT | Performed by: ANESTHESIOLOGY

## 2021-09-13 PROCEDURE — G8419 CALC BMI OUT NRM PARAM NOF/U: HCPCS | Performed by: ANESTHESIOLOGY

## 2021-09-13 PROCEDURE — 99215 OFFICE O/P EST HI 40 MIN: CPT | Performed by: ANESTHESIOLOGY

## 2021-09-13 PROCEDURE — G8427 DOCREV CUR MEDS BY ELIG CLIN: HCPCS | Performed by: ANESTHESIOLOGY

## 2021-09-13 PROCEDURE — 4004F PT TOBACCO SCREEN RCVD TLK: CPT | Performed by: ANESTHESIOLOGY

## 2021-09-13 RX ORDER — TAPENTADOL HYDROCHLORIDE 50 MG/1
50 TABLET, FILM COATED ORAL 2 TIMES DAILY PRN
Qty: 60 TABLET | Refills: 0 | Status: SHIPPED | OUTPATIENT
Start: 2021-09-13 | End: 2021-10-13

## 2021-09-13 RX ORDER — GABAPENTIN 800 MG/1
TABLET ORAL
Qty: 90 TABLET | Refills: 0 | Status: SHIPPED | OUTPATIENT
Start: 2021-09-13 | End: 2021-11-08 | Stop reason: SDUPTHER

## 2021-09-13 RX ORDER — TIZANIDINE 4 MG/1
4 TABLET ORAL EVERY 8 HOURS PRN
Qty: 90 TABLET | Refills: 2 | Status: SHIPPED | OUTPATIENT
Start: 2021-09-13 | End: 2021-10-11

## 2021-09-13 NOTE — PROGRESS NOTES
Chronic Pain/PM&R Clinic Note     Encounter Date: 7/19/21    Subjective:   Chief Complaint:   Chief Complaint   Patient presents with    Follow-up       History of Present Illness:   Yue Akers is a 61 y.o. male seen in the clinic initially on 07/19/21 upon request from 2020 Alena Koo MD for his history of chronic low back pain. He has a medical history of previous L2-S1 lumbar decompression/fusion by Dr. Casa Sarabia in 2018, COPD, and anxiety/depression. He has been dealing with chronic low back and right-sided buttocks pain since his back surgery in 2018. He describes his pain to be a constant 9/10 stabbing, pins/needles, burning pain. States that the buttocks pain will radiate to the lateral aspect of the hip and down the lateral aspect and posterior aspect of the thigh. He states his pain is worse with any activity where he is upright walking. His pain is improved with rest and medications. He feels like the right leg is weaker than the left but denies any associated numbness/tingling, saddle anesthesia, bowel/bladder incontinence. He states that he has seen previous pain management (Dr. Elif Mabry). He states that he had a couple different injections including SI joint injection, lumbar epidural steroid injections, and facet injections. He feels like ejections were not helping him out much. Today, 9/13/2021, patient presents for planned follow-up for chronic low back pain. He underwent a right SI joint injection on 8/3/21. He reports no relief from this injection. He states that he has been taking more of his Percocet for than prescribed and actually ran out this past Wednesday. He states that he continues to have right-sided hip pain particularly in the top of his hip bone radiating laterally across to his low back. He states is uncomfortable for him to even sit down at times. He states he has not had a dedicated MRI of his low back.   He has not done physical therapy recently for his low back.      History of Interventions:   Surgery: Previous L2-S1 decompression/fusion in 2018 (Dr. Leo Harris)  Injections: Multiple in past (Dr. Hoda Kaminski)  R SI joint inj (8/3/21) - no relief    Current Treatment Medications:   Gabapentin 800 mg TID  Tizanidine 4 mg PRN  Meloxciam 15 mg daily    Historical Treatment Medications:   Norco - hives, itching  Tramadol - ineffective  Naproxen - ineffective   Lyrica - ineffective       Imaging:  XR L-spine (4/18/18)    LUMBAR SPINE 2 VIEWS:       CLINICAL INFORMATION: Back pain. Lumbar fusion.       COMPARISON: Earlier film same date, 1135 hours       TECHNIQUE: Standard AP and crossfire lateral views of lumbar spine were obtained.       FINDINGS: Posterior lumbar fusion has been performed from L2-S1. Metallic pedicle screws and rods are present. A disc spacer device has been inserted at the L5-S1 level. Lumbar vertebra are normally aligned.               Impression   Postop appearance lumbar spine. Past Medical History:   Diagnosis Date    Anxiety     Arthritis     Bradycardia     HR 50 on preop EKG    Cervical spondylosis with myelopathy     COPD (chronic obstructive pulmonary disease) (HCC)     breathing worse in the heat    Depression     GERD (gastroesophageal reflux disease)     Headache(784.0)     Hyperlipidemia     Low back pain     was on Xanax from pain clinic - no longer has script    Prolonged emergence from general anesthesia     Snoring     no apnea, BMI 27, neck circ. 15 inches, will wake coughing, no choking, no daytime somnolence    Tobacco abuse        Past Surgical History:   Procedure Laterality Date    BACK INJECTION Right 8/3/2021    right sacroiliac joint injection performed by William Mtz, DO at [address]  11/2015    cervical fusion C3-5?  Dr. Tomeka Escobedo Bilateral     Dr. Sibley Flatten N/A 4/18/2018    LUMBAR LAMINECTOMY WITH PSF L2-S1, PLIF L5-S1 WITH SOLERA 5.5 CAPSTONE AND OPAL DBM LOCAL BONE GRAFGING performed by Angelita Hester MD at [address] [facility] Left 01/2016    [facility] Frankey Dibble [facility]    UPPER GASTROINTESTINAL ENDOSCOPY         Family History   Problem Relation Age of Onset    High Blood Pressure Mother     High Blood Pressure Father     Heart Disease Father         CAD    Heart Surgery Father 61        CAGB    COPD Sister     Emphysema Sister     Cancer Sister         Throat CA/bone cancer       Social History     Socioeconomic History    Marital status: Single     Spouse name: Not on file    Number of children: Not on file    Years of education: Not on file    Highest education level: Not on file   Occupational History    Not on file   Tobacco Use    Smoking status: Current Every Day Smoker     Packs/day: 0.50     Years: 40.00     Pack years: 20.00     Types: Cigarettes    Smokeless tobacco: Never Used   Vaping Use    Vaping Use: Never used   Substance and Sexual Activity    Alcohol use: Yes     Alcohol/week: 0.0 standard drinks     Comment: 2-3 a couple times a week    Drug use: No    Sexual activity: Not on file   Other Topics Concern    Not on file   Social History Narrative    Not on file     Social Determinants of Health     Financial Resource Strain:     Difficulty of Paying Living Expenses:    Food Insecurity:     Worried About Running Out of Food in the Last Year:     [address] in the Last Year:    Transportation Needs:     Lack of Transportation (Medical):      Lack of Transportation (Non-Medical):    Physical Activity:     Days of Exercise per Week:     Minutes of Exercise per Session:    Stress:     Feeling of Stress :    Social Connections:     Frequency of Communication with Friends and Family:     Frequency of Social Gatherings with Friends and Family:     Attends Temple Services:     Active Member of Clubs or Organizations:     Attends Club or Organization Meetings:     Marital Status:    Intimate Partner Violence:     Fear of Current or Ex-Partner:     Emotionally Abused:     Physically Abused:     Sexually Abused:        Medications & Allergies:   Current Outpatient Medications   Medication Instructions    albuterol-ipratropium (COMBIVENT)  MCG/ACT inhaler 2 puffs, Inhalation, EVERY 6 HOURS PRN    aspirin 81 mg, Oral, DAILY    gabapentin (NEURONTIN) 800 MG tablet TAKE 1 TABLET BY MOUTH THREE TIMES A DAY    Nucynta 50 mg, Oral, 2 TIMES DAILY PRN    omeprazole (PRILOSEC) 40 mg, Oral, DAILY    tiZANidine (ZANAFLEX) 4 mg, Oral, EVERY 8 HOURS PRN       Allergies   Allergen Reactions    Norco [Hydrocodone-Acetaminophen] Hives and Itching       Review of Systems:   Constitutional: negative for weight changes or fevers  Genitourinary: negative for bowel/bladder incontinence   Musculoskeletal: positive for low back and right hip pain  Neurological: negative for any leg weakness or numbness/tingling  Behavioral/Psych: positive for anxiety/depression   All other systems reviewed and are negative    Objective:     Vitals:    09/13/21 1556   BP: 122/70       Constitutional: Pleasant, no acute distress   Head: Normocephalic, atraumatic   Eyes: Conjunctivae normal   Neck: Supple, symmetrical   Lungs: Normal respiratory effort, non-labored breathing   Cardiovascular: Limbs warm and well perfused   Abdomen: Non-protruded   Musculoskeletal: Muscle bulk symmetric, no atrophy, no gross deformities   · Lumbar Spine: ROM reduced in extension. Lumbar paraspinals tender bilaterally. Seated SLR equivocal on right. DAVID positive right. GAENSLEN positive right. SI joint distraction test positive on right. Positive facet loading bilaterally. Right SI joint tender to palpation. Neurological: Cranial nerves II-XII grossly intact. · Gait - Slightly antalgic gait. Ambulates without assistive device.    · Motor: 5/5 muscle strength in bilateral hip flexion, knee flexion, knee extension, ankle dorsiflexion, and ankle plantar flexion   · Sensory: Allodynia/hyperesthesia over right hip (iliac crest)  · Reflexes: 1+ symmetrical in bilateral achilles, 1+ bilateral patellar, negative ankle clonus  Skin: Healed midline surgical incision scar in low back  Psychological: Cooperative, no exaggerated pain behaviors     Assessment:    Diagnosis Orders   1. Right hip pain  CHG FLUOR NEEDLE/CATH SPINE/PARASPINAL DX/THER ADDON    KY INJECTION AA&/STRD OTHER PERIPHERAL NERVE/BRANCH   2. Other chronic pain  tapentadol (NUCYNTA) 50 MG TABS   3. Spasm of muscle  tiZANidine (ZANAFLEX) 4 MG tablet   4. Cervical spondylosis with myelopathy  gabapentin (NEURONTIN) 800 MG tablet   5. Spinal stenosis of lumbar region with neurogenic claudication  gabapentin (NEURONTIN) 800 MG tablet   6. Lumbar back pain with radiculopathy affecting left lower extremity  gabapentin (NEURONTIN) 800 MG tablet       Holly Fabian is a 61 y.o.male presenting to the pain clinic for evaluation of chronic low back/right buttocks pain in the setting of previous L2-S1 decompression/fusion in 2018. His clinical history and physical examination are consistent with severe right SI joint dysfunction/pain. I have set him up for a right SI joint injection under fluoroscopy. In addition, due to failed multiple adjuvant medications I have started the patient on low-dose Percocet for breakthrough pain. We may potentially investigate the use of a spinal cord stimulation in the setting of failed back surgical syndrome. He failed to respond to a right SI joint injection. He appears to have symptoms and examination findings consistent with right cluneal neuralgia. I set him up for right cluneal nerve block under fluoroscopy. I have also ordered an MRI of the lumbar spine for further evaluation to see if he is having any nerve root irritation in his lumbar spine.   I have switched him from Percocet to Nucynta 50 mg twice a day.      Plan: The following treatment recommendations and plan were discussed in detail with Jahaira Ni. Imaging:   I have reviewed patients imaging of XR L-spine and results were discussed with patient today. With worsening radicular pain in his lower limbs, I have ordered an MRI of the lumbar spine for further evaluation. Analgesics:   With continued chronic pain, failed response to multiple adjuvant agents, and failed back surgical syndrome, and insignificant response to Percocet I have started the patient on Nucynta 50 mg twice a day. Patient is advised take this medication as prescribed as taking more than prescribed can lead to development of tolerance, physical dependence, addiction. Patient should store and locked medication in a safe and secure location. OARRS reviewed and is appropriate. Pain contract signed. Patient is taking Acetaminophen. Patient informed that the maximum amount of acetaminophen taken on a regular basis should only be 4000 mg per day. Patient is taking Meloxicam. Patient is advised to take as prescribed and not take on an empty stomach. Adjuvants: In light of the presence of a neuropathic component of pain, we will continue gabapentin 800 mg 3 times a day. Interventions: With examination consistent with right cluneal neuralgia pain, we will proceed with a right cluneal nerve block. The risks and benefits were discussed in detail with the patient. Patient wants to proceed with the injection. Anticoagulation/NPO Recommendations:   Patient does not need to hold any medications prior to the procedure. Patient will need to be NPO x 8 hours prior to the procedure.   We will start an IV prior to the procedure     Multidisciplinary Pain Management:   In the presence of complex, chronic, and multi-factorial pain, the importance of a multidisciplinary approach to pain management in the patients management regimen was emphasized and discussed in

## 2021-09-14 ENCOUNTER — TELEPHONE (OUTPATIENT)
Dept: PHYSICAL MEDICINE AND REHAB | Age: 66
End: 2021-09-14

## 2021-09-14 RX ORDER — OXYCODONE AND ACETAMINOPHEN 10; 325 MG/1; MG/1
1 TABLET ORAL 2 TIMES DAILY PRN
Qty: 14 TABLET | Refills: 0 | Status: SHIPPED | OUTPATIENT
Start: 2021-09-14 | End: 2021-09-21

## 2021-09-14 NOTE — TELEPHONE ENCOUNTER
Pt. Returned call. Procedure and follow up scheduled, educated on pre-procedure instructions, also mailed. States that we have to get a prior Rebecca Gutierrez for his Nucynta so he wants to know if he can have a refill on his Percocet until he can obtain his medication.   Please advise, Thanks

## 2021-09-21 NOTE — TELEPHONE ENCOUNTER
Outcome:  Approved on September 14  Request Reference Number: [id number]. NUCYNTA TAB 50MG is approved through 12/31/2021. Your patient may now fill this prescription and it will be covered  Pt voiced understanding received approval for Nucynta, pt states he will contact his pharmacy    Spoke with Cuate Mackey at [facility] who states is going through patients' insurance okay and \"will have to order for this patient. Should be received by pharmacy by Thursday. \"

## 2021-09-21 NOTE — TELEPHONE ENCOUNTER
Patient states he contacted his pharmacy re Giovanni Roca and was told it has to be ordered and he could pick it up by Thursday or Friday this week.      Asking can he continue one more week of percocet to get him through    Please advise

## 2021-09-26 NOTE — H&P
Today, patient presents for planned right cluneal nerve block. This note is reflective of the patient's previous visit for evaluation. We will proceed with today's planned procedure. Since patient's last visit for evaluation, there have been no interval changes in medical history. Patient has no new numbness, weakness, or focal neurological deficit since evaluation. Patient has no contraindications to injection (no anticoagulation or recent antibiotic intake for active infections), and has a  present or is able to drive themselves (as discussed and cleared by physician). Allergies to latex, contrast dye, and steroid medications have been confirmed with the patient prior to the procedure. NPO necessity has been assessed and accepted based on procedure complexity. The risks and benefits of the procedure have been explained including but are not limited to infection, bleeding, paralysis, immediate post procedure weakness, and dizziness; the patient acknowledges understanding and desires to proceed with the procedure. Patient has signed consent for same procedure as discussed in previous clinic encounter. All other questions and concerns were addressed at bedside. See procedure note for full details. Post procedure Instructions: The patient was advised not to drive during the day of the procedure and not to engage in any significant decision making (unless otherwise states by physician). The patient was also advised to be cautious with walking/activity for 24 hours following today's visit and asked not to engage in over-exertion (unless otherwise states by physician). After this time, it is ok to resume pre-procedure level of activity. Patient advised to apply ice to site of injection in situations of pain and discomfort. Patient advised to not submerge site of injection during bath or pool activities for approximately 24 hours post-procedure.  Patient attested to understanding post procedure directions / restrictions. All other questions and concerns addressed before patient discharge in ambulatory fashion. Chronic Pain/PM&R Clinic Note     Encounter Date: 7/19/21    Subjective:   Chief Complaint:   No chief complaint on file. History of Present Illness:   Nahun Cuadra is a 61 y.o. male seen in the clinic initially on 07/19/21 upon request from 2020 Alena Koo MD for his history of chronic low back pain. He has a medical history of previous L2-S1 lumbar decompression/fusion by Dr. Marianne Rees in 2018, COPD, and anxiety/depression. He has been dealing with chronic low back and right-sided buttocks pain since his back surgery in 2018. He describes his pain to be a constant 9/10 stabbing, pins/needles, burning pain. States that the buttocks pain will radiate to the lateral aspect of the hip and down the lateral aspect and posterior aspect of the thigh. He states his pain is worse with any activity where he is upright walking. His pain is improved with rest and medications. He feels like the right leg is weaker than the left but denies any associated numbness/tingling, saddle anesthesia, bowel/bladder incontinence. He states that he has seen previous pain management (Dr. Seymour Bailey). He states that he had a couple different injections including SI joint injection, lumbar epidural steroid injections, and facet injections. He feels like ejections were not helping him out much. Today, 9/13/2021, patient presents for planned follow-up for chronic low back pain. He underwent a right SI joint injection on 8/3/21. He reports no relief from this injection. He states that he has been taking more of his Percocet for than prescribed and actually ran out this past Wednesday. He states that he continues to have right-sided hip pain particularly in the top of his hip bone radiating laterally across to his low back. He states is uncomfortable for him to even sit down at times.   He states he has not had a dedicated MRI of his low back. He has not done physical therapy recently for his low back. History of Interventions:   Surgery: Previous L2-S1 decompression/fusion in 2018 (Dr. Marianne Rees)  Injections: Multiple in past (Dr. Seymour Bailey)  R SI joint inj (8/3/21) - no relief    Current Treatment Medications:   Gabapentin 800 mg TID  Tizanidine 4 mg PRN  Meloxciam 15 mg daily    Historical Treatment Medications:   Norco - hives, itching  Tramadol - ineffective  Naproxen - ineffective   Lyrica - ineffective       Imaging:  XR L-spine (4/18/18)    LUMBAR SPINE 2 VIEWS:       CLINICAL INFORMATION: Back pain. Lumbar fusion.       COMPARISON: Earlier film same date, 1135 hours       TECHNIQUE: Standard AP and crossfire lateral views of lumbar spine were obtained.       FINDINGS: Posterior lumbar fusion has been performed from L2-S1. Metallic pedicle screws and rods are present. A disc spacer device has been inserted at the L5-S1 level. Lumbar vertebra are normally aligned.               Impression   Postop appearance lumbar spine. Past Medical History:   Diagnosis Date    Anxiety     Arthritis     Bradycardia     HR 50 on preop EKG    Cervical spondylosis with myelopathy     COPD (chronic obstructive pulmonary disease) (HCC)     breathing worse in the heat    Depression     GERD (gastroesophageal reflux disease)     Headache(784.0)     Hyperlipidemia     Low back pain     was on Xanax from pain clinic - no longer has script    Prolonged emergence from general anesthesia     Snoring     no apnea, BMI 27, neck circ. 15 inches, will wake coughing, no choking, no daytime somnolence    Tobacco abuse        Past Surgical History:   Procedure Laterality Date    BACK INJECTION Right 8/3/2021    right sacroiliac joint injection performed by Jolene Cook DO at [address]  11/2015    cervical fusion C3-5?  Dr. Diana Brooks Bilateral     Dr. Uyen Dowell COLONOSCOPY  2014  OR SPINE SURGERY PROCEDURE UNLISTED N/A 4/18/2018    LUMBAR LAMINECTOMY WITH PSF L2-S1, PLIF L5-S1 WITH SOLERA 5.5 CAPSTONE AND OPAL DBM LOCAL BONE GRAFGING performed by Duglas Aldana MD at [facility] Left 01/2016    [facility]. Ana Laura Izquierdo @ [facility]    UPPER GASTROINTESTINAL ENDOSCOPY         Family History   Problem Relation Age of Onset    High Blood Pressure Mother     High Blood Pressure Father     Heart Disease Father         CAD    Heart Surgery Father 61        CAGB    COPD Sister     Emphysema Sister     Cancer Sister         Throat CA/bone cancer       Social History     Socioeconomic History    Marital status: Single     Spouse name: Not on file    Number of children: Not on file    Years of education: Not on file    Highest education level: Not on file   Occupational History    Not on file   Tobacco Use    Smoking status: Current Every Day Smoker     Packs/day: 0.50     Years: 40.00     Pack years: 20.00     Types: Cigarettes    Smokeless tobacco: Never Used   Vaping Use    Vaping Use: Never used   Substance and Sexual Activity    Alcohol use: Yes     Alcohol/week: 0.0 standard drinks     Comment: 2-3 a couple times a week    Drug use: No    Sexual activity: Not on file   Other Topics Concern    Not on file   Social History Narrative    Not on file     Social Determinants of Health     Financial Resource Strain:     Difficulty of Paying Living Expenses:    Food Insecurity:     Worried About Running Out of Food in the Last Year:     [address] in the Last Year:    Transportation Needs:     Lack of Transportation (Medical):      Lack of Transportation (Non-Medical):    Physical Activity:     Days of Exercise per Week:     Minutes of Exercise per Session:    Stress:     Feeling of Stress :    Social Connections:     Frequency of Communication with Friends and Family:     Frequency of Social Gatherings with Friends and Family:     Attends Mandaeism Services:  Active Member of Clubs or Organizations:     Attends Club or Organization Meetings:     Marital Status:    Intimate Partner Violence:     Fear of Current or Ex-Partner:     Emotionally Abused:     Physically Abused:     Sexually Abused:        Medications & Allergies:   Current Outpatient Medications   Medication Instructions    albuterol-ipratropium (COMBIVENT)  MCG/ACT inhaler 2 puffs, Inhalation, EVERY 6 HOURS PRN    aspirin 81 mg, Oral, DAILY    gabapentin (NEURONTIN) 800 MG tablet TAKE 1 TABLET BY MOUTH THREE TIMES A DAY    Nucynta 50 mg, Oral, 2 TIMES DAILY PRN    omeprazole (PRILOSEC) 40 mg, Oral, DAILY    tiZANidine (ZANAFLEX) 4 mg, Oral, EVERY 8 HOURS PRN       Allergies   Allergen Reactions    Norco [Hydrocodone-Acetaminophen] Hives and Itching       Review of Systems:   Constitutional: negative for weight changes or fevers  Genitourinary: negative for bowel/bladder incontinence   Musculoskeletal: positive for low back and right hip pain  Neurological: negative for any leg weakness or numbness/tingling  Behavioral/Psych: positive for anxiety/depression   All other systems reviewed and are negative    Objective: There were no vitals filed for this visit. Constitutional: Pleasant, no acute distress   Head: Normocephalic, atraumatic   Eyes: Conjunctivae normal   Neck: Supple, symmetrical   Lungs: Normal respiratory effort, non-labored breathing   Cardiovascular: Limbs warm and well perfused   Abdomen: Non-protruded   Musculoskeletal: Muscle bulk symmetric, no atrophy, no gross deformities   · Lumbar Spine: ROM reduced in extension. Lumbar paraspinals tender bilaterally. Seated SLR equivocal on right. DAVID positive right. GAENSLEN positive right. SI joint distraction test positive on right. Positive facet loading bilaterally. Right SI joint tender to palpation. Neurological: Cranial nerves II-XII grossly intact. · Gait - Slightly antalgic gait.  Ambulates without assistive device. · Motor: 5/5 muscle strength in bilateral hip flexion, knee flexion, knee extension, ankle dorsiflexion, and ankle plantar flexion   · Sensory: Allodynia/hyperesthesia over right hip (iliac crest)  · Reflexes: 1+ symmetrical in bilateral achilles, 1+ bilateral patellar, negative ankle clonus  Skin: Healed midline surgical incision scar in low back  Psychological: Cooperative, no exaggerated pain behaviors     Assessment:    Diagnosis Orders   1. Right hip pain  CHG FLUOR NEEDLE/CATH SPINE/PARASPINAL DX/THER ADDON    LA INJECTION AA&/STRD OTHER PERIPHERAL NERVE/BRANCH   2. Other chronic pain  tapentadol (NUCYNTA) 50 MG TABS   3. Spasm of muscle  tiZANidine (ZANAFLEX) 4 MG tablet   4. Cervical spondylosis with myelopathy  gabapentin (NEURONTIN) 800 MG tablet   5. Spinal stenosis of lumbar region with neurogenic claudication  gabapentin (NEURONTIN) 800 MG tablet   6. Lumbar back pain with radiculopathy affecting left lower extremity  gabapentin (NEURONTIN) 800 MG tablet       Jahaira Ni is a 61 y.o.male presenting to the pain clinic for evaluation of chronic low back/right buttocks pain in the setting of previous L2-S1 decompression/fusion in 2018. His clinical history and physical examination are consistent with severe right SI joint dysfunction/pain. I have set him up for a right SI joint injection under fluoroscopy. In addition, due to failed multiple adjuvant medications I have started the patient on low-dose Percocet for breakthrough pain. We may potentially investigate the use of a spinal cord stimulation in the setting of failed back surgical syndrome. He failed to respond to a right SI joint injection. He appears to have symptoms and examination findings consistent with right cluneal neuralgia. I set him up for right cluneal nerve block under fluoroscopy.   I have also ordered an MRI of the lumbar spine for further evaluation to see if he is having any nerve root irritation in management in the patients management regimen was emphasized and discussed in great detail. PHYSICAL THERAPY: We will work on some home stretching exercises for SI joint dysfunction/hip pain at our follow-up    Referrals:  None    Prescriptions Written This Visit:   Nucynta 50 mg (#60, 0 refills)  Gabapentin  Tizanidine    Follow-up: For R cluneal nerve block    I spent 45 minutes with the patient and greater than 50% of this time was spent discussing medication management for chronic pain including switching opioids from Percocet to Nucynta, discussing injection therapy for right hip pain including evaluation for cluneal neuralgia, and order an MRI of the lumbar spine for further evaluation of stenosis contributing to his pain.       Martina Chase, DO  Interventional Pain Management/PM&R   [place]

## 2021-09-27 ENCOUNTER — HOSPITAL ENCOUNTER (OUTPATIENT)
Dept: MRI IMAGING | Age: 66
Discharge: HOME | End: 2021-09-27
Payer: MEDICARE

## 2021-09-27 DIAGNOSIS — M54.16 LUMBAR BACK PAIN WITH RADICULOPATHY AFFECTING LEFT LOWER EXTREMITY: ICD-10-CM

## 2021-09-27 PROCEDURE — 72148 MRI LUMBAR SPINE W/O DYE: CPT

## 2021-09-28 ENCOUNTER — HOSPITAL ENCOUNTER (OUTPATIENT)
Age: 66
Setting detail: OUTPATIENT SURGERY
Discharge: HOME | End: 2021-09-28
Attending: ANESTHESIOLOGY | Admitting: ANESTHESIOLOGY
Payer: MEDICARE

## 2021-09-28 ENCOUNTER — APPOINTMENT (OUTPATIENT)
Dept: GENERAL RADIOLOGY | Age: 66
End: 2021-09-28
Attending: ANESTHESIOLOGY
Payer: MEDICARE

## 2021-09-28 VITALS
SYSTOLIC BLOOD PRESSURE: 117 MMHG | HEART RATE: 68 BPM | HEIGHT: 70 IN | OXYGEN SATURATION: 100 % | WEIGHT: 204.4 LBS | BODY MASS INDEX: 29.26 KG/M2 | DIASTOLIC BLOOD PRESSURE: 72 MMHG | RESPIRATION RATE: 18 BRPM | TEMPERATURE: 98 F

## 2021-09-28 PROCEDURE — 6360000002 HC RX W HCPCS: Performed by: ANESTHESIOLOGY

## 2021-09-28 PROCEDURE — 2500000003 HC RX 250 WO HCPCS: Performed by: ANESTHESIOLOGY

## 2021-09-28 PROCEDURE — 7100000010 HC PHASE II RECOVERY - FIRST 15 MIN: Performed by: ANESTHESIOLOGY

## 2021-09-28 PROCEDURE — 7100000011 HC PHASE II RECOVERY - ADDTL 15 MIN: Performed by: ANESTHESIOLOGY

## 2021-09-28 PROCEDURE — 99152 MOD SED SAME PHYS/QHP 5/>YRS: CPT | Performed by: ANESTHESIOLOGY

## 2021-09-28 PROCEDURE — 3600000054 HC PAIN LEVEL 3 BASE: Performed by: ANESTHESIOLOGY

## 2021-09-28 PROCEDURE — 64450 NJX AA&/STRD OTHER PN/BRANCH: CPT | Performed by: ANESTHESIOLOGY

## 2021-09-28 PROCEDURE — 3209999900 FLUORO FOR SURGICAL PROCEDURES

## 2021-09-28 RX ORDER — BUPIVACAINE HYDROCHLORIDE 5 MG/ML
INJECTION, SOLUTION EPIDURAL; INTRACAUDAL PRN
Status: DISCONTINUED | OUTPATIENT
Start: 2021-09-28 | End: 2021-09-28 | Stop reason: ALTCHOICE

## 2021-09-28 RX ORDER — LIDOCAINE HYDROCHLORIDE 10 MG/ML
INJECTION, SOLUTION EPIDURAL; INFILTRATION; INTRACAUDAL; PERINEURAL PRN
Status: DISCONTINUED | OUTPATIENT
Start: 2021-09-28 | End: 2021-09-28 | Stop reason: ALTCHOICE

## 2021-09-28 RX ORDER — METHYLPREDNISOLONE ACETATE 40 MG/ML
INJECTION, SUSPENSION INTRA-ARTICULAR; INTRALESIONAL; INTRAMUSCULAR; SOFT TISSUE PRN
Status: DISCONTINUED | OUTPATIENT
Start: 2021-09-28 | End: 2021-09-28 | Stop reason: ALTCHOICE

## 2021-09-28 RX ORDER — FENTANYL CITRATE 50 UG/ML
INJECTION, SOLUTION INTRAMUSCULAR; INTRAVENOUS PRN
Status: DISCONTINUED | OUTPATIENT
Start: 2021-09-28 | End: 2021-09-28 | Stop reason: ALTCHOICE

## 2021-09-28 RX ORDER — MIDAZOLAM HYDROCHLORIDE 1 MG/ML
INJECTION INTRAMUSCULAR; INTRAVENOUS PRN
Status: DISCONTINUED | OUTPATIENT
Start: 2021-09-28 | End: 2021-09-28 | Stop reason: ALTCHOICE

## 2021-09-28 ASSESSMENT — PAIN DESCRIPTION - DESCRIPTORS: DESCRIPTORS: ACHING

## 2021-09-28 ASSESSMENT — PAIN - FUNCTIONAL ASSESSMENT: PAIN_FUNCTIONAL_ASSESSMENT: 0-10

## 2021-09-28 NOTE — PROGRESS NOTES
1125- PT arrived to PACU Radha RN at bedside for report, pt hooked up to monitor, VSS, pt breathing deeply on room air. 1127- Snack given. 1147- IV removed, pt getting dressed. 18- Pt discharged walked out to car with his wife in the car.

## 2021-09-28 NOTE — PROCEDURES
Pre-operative Diagnosis: RIGHT hip pain     Post-operative Diagnosis: RIGHT hip pain     Procedure: RIGHT cluneal nerve block     Procedure Description:  After consent was obtained, the patient was placed in the prone position having pressure points appropriately padded. The lower back/hip was prepped with chloraprep and draped in a sterile fashion. 0.5 cc of 1 % lidocaine was used for local anesthesia of the skin and superficial subcutaneous tissues. The right iliac crest was identified under fluoroscopic imaging guidance. A 22-gauge 3.5 inch spinal needle was advanced until touching the tip of the iliac crest on the right. After negative aspiration a mixture of 40 mg Depo-Medrol with 4 cc of 0.5% bupivacaine was injected along the iliac crest laterally. The needle was withdrawn without any complications. Patient tolerated procedure well without any complications.        Procedural Complications: None  Estimated Blood Loss: 0 mL      IV sedation was used during the procedure:  - Moderate intravenous conscious sedation was supervised by Dr. Mustapha Matthews  - The patient was independently monitored by a Registered Nurse assigned to the procedure room  - Monitoring included automated blood pressure, continuous EKG, and continuous pulse oximetry  - The detailed conscious record is permanently stored in the [place] [id number]  - The following is the conscious sedation record:  Start Time: 11:17  End Time : 11:32  Duration: 15 minutes   Medications Administered: 1 mg Versed, 50 mcg Fentanyl     Linden Shetty DO  Interventional Pain Management/PM&R   [place]

## 2021-09-28 NOTE — PRE SEDATION
[address] [place] [id number]  Pre-Sedation/Analgesia History & Physical    Pt Name: Willam Bowen  MRN: [id number]  YOB: 1955  Provider Performing Procedure: Prosper Schrader DO   Primary Care Physician: Rodrigue Clements MD      MEDICAL HISTORY       has a past medical history of Anxiety, Arthritis, Bradycardia, Cervical spondylosis with myelopathy, COPD (chronic obstructive pulmonary disease) ([facility] [address] Depression, GERD (gastroesophageal reflux disease), Headache(784.0), Hyperlipidemia, Low back pain, Prolonged emergence from general anesthesia, Snoring, and Tobacco abuse. SURGICAL HISTORY   has a past surgical history that includes Colonoscopy (2014); Rotator cuff repair (Left, 01/2016); back surgery (11/2015); Upper gastrointestinal endoscopy; Cataract removal with implant (Bilateral); pr spine surgery procedure unlisted (N/A, 4/18/2018); and Back Injection (Right, 8/3/2021). ALLERGIES   Allergies as of 09/14/2021 - Fully Reviewed 09/13/2021   Allergen Reaction Noted    Norco [hydrocodone-acetaminophen] Hives and Itching 04/18/2018       MEDICATIONS   Prior to Admission medications    Medication Sig Start Date End Date Taking? Authorizing Provider   tapentadol (NUCYNTA) 50 MG TABS Take 1 tablet by mouth 2 times daily as needed for Pain for up to 30 days.  9/13/21 10/13/21 Yes Linden Champion,    tiZANidine (ZANAFLEX) 4 MG tablet Take 1 tablet by mouth every 8 hours as needed (muscle spasms) 9/13/21 10/13/21 Yes Linden Shetty DO   gabapentin (NEURONTIN) 800 MG tablet TAKE 1 TABLET BY MOUTH THREE TIMES A DAY 9/13/21 10/13/21 Yes Linden Shetty DO   omeprazole (PRILOSEC) 40 MG delayed release capsule Take 40 mg by mouth daily   Yes Historical Provider, MD   albuterol-ipratropium (COMBIVENT)  MCG/ACT inhaler Inhale 2 puffs into the lungs every 6 hours as needed for Wheezing   Yes Historical Provider, MD   aspirin 81 MG chewable tablet Take 1 tablet by mouth daily 9/6/17   Sonia Peter MD [place]     PHYSICAL:   Vitals:    09/28/21 1125   BP: 117/72   Pulse: 68   Resp: 18   Temp: 98 °F (36.7 °C)   SpO2: 100%     PLANNED PROCEDURE   See procedure note  SEDATION  Planned agent: Versed and Fentanyl  ASA Classification: 2  Class 1: A normal healthy patient  Class 2: Pt with mild to moderate systemic disease  Class 3: Severe systemic disease or disturbance  Class 4: Severe systemic disorders that are already life threatening. Class 5: Moribund pt with little chances of survival, for more than 24 hours. Mallampati I Airway Classification: 2    1. Pre-procedure diagnostic studies complete and results available. 2. Previous sedation/anesthesia experiences assessed. 3. The patient is an appropriate candidate to undergo the planned procedure sedation and anesthesia. (Refer to nursing sedation/analgesia documentation record)  4. Formulation and discussion of sedation/procedure plan, risks, and expectations with patient and/or responsible adult completed. 5. Patient examined immediately prior to the procedure.  (Refer to nursing sedation/analgesia documentation record)    Ene Balderas DO  Electronically signed 9/28/2021 at 2:19 PM

## 2021-09-28 NOTE — POST SEDATION
[facility]  Sedation/Analgesia Post Sedation Record    Pt Name: Anthony Zuniga  MRN: [id number]  YOB: 1955  Procedure Performed By: Ene Balderas DO  Primary Care Physician: Dayana Renee MD    POST-PROCEDURE    Physicians/Assistants: Ene Balderas DO  Procedure Performed: See Procedure Note   Sedation/Anesthesia: Versed and Fentanyl (See procedure note for amount and duration)  Estimated Blood Loss:     0  ml  Specimens Removed: None        Complications: None           Linden Go DO  Electronically signed 9/28/2021 at 2:20 PM

## 2021-10-10 DIAGNOSIS — M62.838 SPASM OF MUSCLE: ICD-10-CM

## 2021-10-11 RX ORDER — TIZANIDINE 4 MG/1
4 TABLET ORAL EVERY 8 HOURS PRN
Qty: 90 TABLET | Refills: 2 | Status: SHIPPED | OUTPATIENT
Start: 2021-10-13 | End: 2021-11-08 | Stop reason: SDUPTHER

## 2021-10-11 NOTE — TELEPHONE ENCOUNTER
OARRS reviewed. UDS: + for Dihydrocodeine, Hydrocodone, Norhydrocodone, Hydromorphone   Last seen: 9/13/2021.  Follow-up:   Future Appointments   Date Time Provider Elizabeth Joanie   11/8/2021 11:40 AM DO ADEEL Allen [facility] - [facility]URAN M[facility]RQUEZ II.MICHELLE   12/8/2021  3:20 PM DO ADEEL Allen [facility] - [facility]ALISIA MÁRQUEZ II.MICHELLE

## 2021-11-08 ENCOUNTER — OFFICE VISIT (OUTPATIENT)
Dept: PHYSICAL MEDICINE AND REHAB | Age: 66
End: 2021-11-08
Payer: MEDICARE

## 2021-11-08 VITALS
DIASTOLIC BLOOD PRESSURE: 74 MMHG | WEIGHT: 204 LBS | BODY MASS INDEX: 29.2 KG/M2 | SYSTOLIC BLOOD PRESSURE: 114 MMHG | HEIGHT: 70 IN

## 2021-11-08 DIAGNOSIS — M47.12 CERVICAL SPONDYLOSIS WITH MYELOPATHY: ICD-10-CM

## 2021-11-08 DIAGNOSIS — M62.838 SPASM OF MUSCLE: ICD-10-CM

## 2021-11-08 DIAGNOSIS — M96.1 FAILED BACK SURGICAL SYNDROME: ICD-10-CM

## 2021-11-08 DIAGNOSIS — M54.16 LUMBAR BACK PAIN WITH RADICULOPATHY AFFECTING LEFT LOWER EXTREMITY: ICD-10-CM

## 2021-11-08 DIAGNOSIS — M48.062 SPINAL STENOSIS OF LUMBAR REGION WITH NEUROGENIC CLAUDICATION: ICD-10-CM

## 2021-11-08 DIAGNOSIS — G89.29 OTHER CHRONIC PAIN: Primary | ICD-10-CM

## 2021-11-08 PROCEDURE — 4004F PT TOBACCO SCREEN RCVD TLK: CPT | Performed by: ANESTHESIOLOGY

## 2021-11-08 PROCEDURE — 99215 OFFICE O/P EST HI 40 MIN: CPT | Performed by: ANESTHESIOLOGY

## 2021-11-08 PROCEDURE — G8419 CALC BMI OUT NRM PARAM NOF/U: HCPCS | Performed by: ANESTHESIOLOGY

## 2021-11-08 PROCEDURE — G8427 DOCREV CUR MEDS BY ELIG CLIN: HCPCS | Performed by: ANESTHESIOLOGY

## 2021-11-08 PROCEDURE — G8484 FLU IMMUNIZE NO ADMIN: HCPCS | Performed by: ANESTHESIOLOGY

## 2021-11-08 PROCEDURE — 3017F COLORECTAL CA SCREEN DOC REV: CPT | Performed by: ANESTHESIOLOGY

## 2021-11-08 RX ORDER — TIZANIDINE 4 MG/1
4 TABLET ORAL EVERY 8 HOURS PRN
Qty: 90 TABLET | Refills: 2 | Status: SHIPPED | OUTPATIENT
Start: 2021-11-08 | End: 2021-12-08

## 2021-11-08 RX ORDER — GABAPENTIN 800 MG/1
TABLET ORAL
Qty: 90 TABLET | Refills: 2 | Status: SHIPPED | OUTPATIENT
Start: 2021-11-08 | End: 2022-02-10 | Stop reason: SDUPTHER

## 2021-11-08 NOTE — PROGRESS NOTES
Chronic Pain/PM&R Clinic Note     Encounter Date: 11/8/21    Subjective:   Chief Complaint:   Chief Complaint   Patient presents with    Follow-up     Review MRI results    Medication Refill     Nucynta and Gabapentin        History of Present Illness:   Caden Cornelius is a 61 y.o. male seen in the clinic initially on 07/19/21 upon request from 2020 Alena Koo MD for his history of chronic low back pain. He has a medical history of previous L2-S1 lumbar decompression/fusion by Dr. Shiela iL in 2018, COPD, and anxiety/depression. He has been dealing with chronic low back and right-sided buttocks pain since his back surgery in 2018. He describes his pain to be a constant 9/10 stabbing, pins/needles, burning pain. States that the buttocks pain will radiate to the lateral aspect of the hip and down the lateral aspect and posterior aspect of the thigh. He states his pain is worse with any activity where he is upright walking. His pain is improved with rest and medications. He feels like the right leg is weaker than the left but denies any associated numbness/tingling, saddle anesthesia, bowel/bladder incontinence. He states that he has seen previous pain management (Dr. Jet Hill). He states that he had a couple different injections including SI joint injection, lumbar epidural steroid injections, and facet injections. He feels like ejections were not helping him out much. Today, 11/8/2021, patient presents for planned follow-up for chronic low back pain. Overall, he has been doing poor since his last visit. He states that he has been out of his Nucynta for about 2 weeks now. He reports not having any benefit on the medication. Continues to struggle with his low back pain and hip pain and feels like it is even becoming increasingly difficult for him to stand upright or get comfortable in bed to sleep.   He denies any new symptoms of worsening radiating leg pain, new focal leg weakness, new leg paresthesias, saddle anesthesia, bowel/bladder incontinence. He is wondering about the results of his MRI of the lumbar spine as he could not make out the findings and what the report meant. History of Interventions:   Surgery: Previous L2-S1 decompression/fusion in 2018 (Dr. Wing Whittaker)  Injections: Multiple in past (Dr. Alfreda Malave)  R SI joint inj (8/3/21) - no relief  R cluneal nerve block (9/28/21) - no relief    Current Treatment Medications:   Gabapentin 800 mg TID  Tizanidine 4 mg PRN  Meloxciam 15 mg daily    Historical Treatment Medications:   Norco - hives, itching  Tramadol - ineffective  Naproxen - ineffective   Lyrica - ineffective     Imaging:  XR L-spine (4/18/18)    LUMBAR SPINE 2 VIEWS:       CLINICAL INFORMATION: Back pain. Lumbar fusion.       COMPARISON: Earlier film same date, 1135 hours       TECHNIQUE: Standard AP and crossfire lateral views of lumbar spine were obtained.       FINDINGS: Posterior lumbar fusion has been performed from L2-S1. Metallic pedicle screws and rods are present. A disc spacer device has been inserted at the L5-S1 level. Lumbar vertebra are normally aligned.               Impression   Postop appearance lumbar spine. Past Medical History:   Diagnosis Date    Anxiety     Arthritis     Bradycardia     HR 50 on preop EKG    Cervical spondylosis with myelopathy     COPD (chronic obstructive pulmonary disease) (HCC)     breathing worse in the heat    Depression     GERD (gastroesophageal reflux disease)     Headache(784.0)     Hyperlipidemia     Low back pain     was on Xanax from pain clinic - no longer has script    Prolonged emergence from general anesthesia     Snoring     no apnea, BMI 27, neck circ. 15 inches, will wake coughing, no choking, no daytime somnolence    Tobacco abuse        Past Surgical History:   Procedure Laterality Date    BACK INJECTION Right 8/3/2021    right sacroiliac joint injection performed by Duane Herrera DO at [address] [place] [address] BACK SURGERY  11/2015    cervical fusion C3-5? Dr. Tao Mijares Bilateral     Dr. Pato Ruiz  2014    PAIN MANAGEMENT PROCEDURE Right 9/28/2021    right cluneal nerve block performed by Anshul Lima DO at [address] [place] [address] [place] UNLISTED N/A 4/18/2018    LUMBAR LAMINECTOMY WITH PSF L2-S1, PLIF L5-S1 WITH SOLERA 5.5 CAPSTONE AND OPAL [place] performed by Gareth Courtney MD at [address] Left 01/2016    Dr. Kristine Walker @ [facility]    UPPER GASTROINTESTINAL ENDOSCOPY         Family History   Problem Relation Age of Onset    High Blood Pressure Mother     High Blood Pressure Father     Heart Disease Father         CAD    Heart Surgery Father 61        CAGB    COPD Sister     Emphysema Sister     Cancer Sister         Throat CA/bone cancer       Social History     Socioeconomic History    Marital status: Single     Spouse name: Not on file    Number of children: Not on file    Years of education: Not on file    Highest education level: Not on file   Occupational History    Not on file   Tobacco Use    Smoking status: Current Every Day Smoker     Packs/day: 0.50     Years: 40.00     Pack years: 20.00     Types: Cigarettes    Smokeless tobacco: Never Used   Vaping Use    Vaping Use: Never used   Substance and Sexual Activity    Alcohol use: Yes     Alcohol/week: 0.0 standard drinks     Comment: occasionnally    Drug use: No    Sexual activity: Not on file   Other Topics Concern    Not on file   Social History Narrative    Not on file     Social Determinants of Health     Financial Resource Strain:     Difficulty of Paying Living Expenses: Not on file   Food Insecurity:     Worried About Running Out of Food in the Last Year: Not on file    Amina of Food in the Last Year: Not on file   Transportation Needs:     Lack of Transportation (Medical):  Not on file    Lack of Transportation (Non-Medical):  Not on file   Physical Activity:     Days of Exercise per Week: Not on file    Minutes of Exercise per Session: Not on file   Stress:     Feeling of Stress : Not on file   Social Connections:     Frequency of Communication with Friends and Family: Not on file    Frequency of Social Gatherings with Friends and Family: Not on file    Attends [employer]ism Services: Not on file    Active Member of [address] [place] [address] or Organizations: Not on file    Attends Club or Organization Meetings: Not on file    Marital Status: Not on file   Intimate Partner Violence:     Fear of Current or Ex-Partner: Not on file    Emotionally Abused: Not on file    Physically Abused: Not on file    Sexually Abused: Not on file   Housing Stability:     Unable to Pay for Housing in the Last Year: Not on file    Number of Places Lived in the Last Year: Not on file    Unstable Housing in the Last Year: Not on file       Medications & Allergies:   Current Outpatient Medications   Medication Instructions    albuterol-ipratropium (COMBIVENT)  MCG/ACT inhaler 2 puffs, Inhalation, EVERY 6 HOURS PRN    aspirin 81 mg, Oral, DAILY    gabapentin (NEURONTIN) 800 MG tablet TAKE 1 TABLET BY MOUTH THREE TIMES A DAY    omeprazole (PRILOSEC) 40 mg, Oral, DAILY    tapentadol (NUCYNTA) 100 mg, Oral, 2 TIMES DAILY PRN    tiZANidine (ZANAFLEX) 4 mg, Oral, EVERY 8 HOURS PRN       Allergies   Allergen Reactions    Norco [Hydrocodone-Acetaminophen] Hives and Itching       Review of Systems:   Constitutional: negative for weight changes or fevers  Genitourinary: negative for bowel/bladder incontinence   Musculoskeletal: positive for low back and right hip pain  Neurological: negative for any leg weakness or numbness/tingling  Behavioral/Psych: positive for anxiety/depression   All other systems reviewed and are negative    Objective:     Vitals:    11/08/21 1119   BP: 114/74       Constitutional: Pleasant, no acute distress   Head: potentially investigate the use of a spinal cord stimulation in the setting of failed back surgical syndrome. He failed to respond to a right SI joint injection and a right cluneal nerve block. We have extensively reviewed his lumbar spine MRI and I have referred him to neurosurgery for second opinion and read on his MRI of the lumbar spine and for evaluation. I have referred him to physical therapy for posterior core strengthening. In addition, I have increased his Nucynta to 100 mg twice a day while continuing his gabapentin tizanidine. Plan: The following treatment recommendations and plan were discussed in detail with Alfredito Mendiola. Imaging:   I have reviewed patients imaging of XR L-spine and results were discussed with patient today. With worsening radicular pain in his lower limbs, I have ordered an MRI of the lumbar spine for further evaluation. Analgesics:   With continued chronic pain, failed response to multiple adjuvant agents, and failed back surgical syndrome, and insignificant response to Percocet I have increased the patient's Nucynta to 100 mg twice a day as needed for severe pain (pain greater than 7/10). Patient is advised take this medication as prescribed as taking more than prescribed can lead to development of tolerance, physical dependence, addiction. Patient should store and locked medication in a safe and secure location. OARRS reviewed and is appropriate. Pain contract signed. Patient is taking Acetaminophen. Patient informed that the maximum amount of acetaminophen taken on a regular basis should only be 4000 mg per day. Patient is taking Meloxicam. Patient is advised to take as prescribed and not take on an empty stomach. Adjuvants: In light of the presence of a neuropathic component of pain, we will continue gabapentin 800 mg 3 times a day.     In the presence of musculoskeletal pain/muscle spasms, I have continue the patient on Zanaflex 4 mg every 8 hours as needed. Interventions:   None    Anticoagulation/NPO Recommendations:   None    Multidisciplinary Pain Management:   In the presence of complex, chronic, and multi-factorial pain, the importance of a multidisciplinary approach to pain management in the patients management regimen was emphasized and discussed in great detail. PHYSICAL THERAPY: I refer patient to physical therapy for posterior chain core strengthening program to work on lumbar paraspinal muscle strengthening    Referrals:  NSGY - to review MRI findings and discuss results  Physical Therapy - for posterior core strengthening    Prescriptions Written This Visit:   Nucynta 100 mg (#60, 0 refills)  Gabapentin 800 mg  Tizanidine 4 mg    Follow-up: 12 weeks (after completion of PT)    I spent 40 minutes with the patient and greater than 50% of this time was spent reviewing patient's MRI of the lumbar spine, discussing the reason and referral to neurosurgery for second opinion and review of his MRI of the lumbar spine, discussing importance of physical therapy in the setting of posterior core strengthening, and discussing of increasing his opioid and risk versus benefits of long-term opioid therapy.       Chanell Santillan, DO  Interventional Pain Management/PM&R   [place]

## 2021-12-07 DIAGNOSIS — G89.29 OTHER CHRONIC PAIN: ICD-10-CM

## 2021-12-07 DIAGNOSIS — M48.062 SPINAL STENOSIS OF LUMBAR REGION WITH NEUROGENIC CLAUDICATION: ICD-10-CM

## 2021-12-07 DIAGNOSIS — M47.12 CERVICAL SPONDYLOSIS WITH MYELOPATHY: ICD-10-CM

## 2021-12-07 DIAGNOSIS — M54.16 LUMBAR BACK PAIN WITH RADICULOPATHY AFFECTING LEFT LOWER EXTREMITY: ICD-10-CM

## 2021-12-08 ENCOUNTER — OFFICE VISIT (OUTPATIENT)
Dept: NEUROSURGERY | Age: 66
End: 2021-12-08
Payer: MEDICARE

## 2021-12-08 VITALS
SYSTOLIC BLOOD PRESSURE: 142 MMHG | DIASTOLIC BLOOD PRESSURE: 88 MMHG | HEART RATE: 72 BPM | WEIGHT: 213 LBS | BODY MASS INDEX: 30.56 KG/M2

## 2021-12-08 DIAGNOSIS — M54.50 CHRONIC LOW BACK PAIN, UNSPECIFIED BACK PAIN LATERALITY, UNSPECIFIED WHETHER SCIATICA PRESENT: ICD-10-CM

## 2021-12-08 DIAGNOSIS — G89.29 CHRONIC LOW BACK PAIN, UNSPECIFIED BACK PAIN LATERALITY, UNSPECIFIED WHETHER SCIATICA PRESENT: ICD-10-CM

## 2021-12-08 DIAGNOSIS — M47.816 SPONDYLOSIS OF LUMBAR SPINE: ICD-10-CM

## 2021-12-08 DIAGNOSIS — M96.1 POSTLAMINECTOMY SYNDROME: Primary | ICD-10-CM

## 2021-12-08 DIAGNOSIS — G89.4 CHRONIC PAIN DISORDER: ICD-10-CM

## 2021-12-08 DIAGNOSIS — Z98.1 STATUS POST LUMBAR SPINAL FUSION: ICD-10-CM

## 2021-12-08 PROCEDURE — G8417 CALC BMI ABV UP PARAM F/U: HCPCS | Performed by: NEUROLOGICAL SURGERY

## 2021-12-08 PROCEDURE — G8427 DOCREV CUR MEDS BY ELIG CLIN: HCPCS | Performed by: NEUROLOGICAL SURGERY

## 2021-12-08 PROCEDURE — 3017F COLORECTAL CA SCREEN DOC REV: CPT | Performed by: NEUROLOGICAL SURGERY

## 2021-12-08 PROCEDURE — G8484 FLU IMMUNIZE NO ADMIN: HCPCS | Performed by: NEUROLOGICAL SURGERY

## 2021-12-08 PROCEDURE — 99204 OFFICE O/P NEW MOD 45 MIN: CPT | Performed by: NEUROLOGICAL SURGERY

## 2021-12-08 PROCEDURE — 4004F PT TOBACCO SCREEN RCVD TLK: CPT | Performed by: NEUROLOGICAL SURGERY

## 2021-12-08 RX ORDER — GABAPENTIN 800 MG/1
TABLET ORAL
Qty: 90 TABLET | Refills: 2 | Status: CANCELLED | OUTPATIENT
Start: 2021-12-08 | End: 2022-01-06

## 2021-12-08 NOTE — LETTER
[facility]  [address]  Phone: [phone]  Fax: [phone]           Garry Bond MD      December 22, 2021     Patient: Fany Branham   MR Number: [id number]   YOB: 1955   Date of Visit: 12/8/2021       Dear Dr. Latasha Rodriguez: Thank you for referring Veronica Gage to me for evaluation/treatment. Below are the relevant portions of my assessment and plan of care. HPI     This is a 51-year-old male with a progressive history of severe low back pain. Patient symptoms started several years ago and have been progressively gotten worse with time, especially over the last 7 months. Patient underwent lumbar spine surgery that included lumbar spine instrumentation and fusion about 3 years ago. Currently patient has severe low back pain that is up to 9/10. Patient's pain increases with activity and standing. Patient pain goes to both legs posterior aspect of the thighs. Patient stated that she feels weakness in her right leg. Patient denied any significant issue in controlling urination or bowel habits. Patient underwent lumbar spine injection in the past but without significant help. Examination of carotid arteries (puls, amplitude, bruits) or Examination of peripheral vascular system  (swelling, varicosities and pulses, temperature, edema,tenderness : Unremarkable  Patient is awake, alert and oriented x3  Recent and remote memory: decline  Attention span and concentration: decline  Language (naming objects;repeating phrases;spontaneous speech): WNL  Fund of knowledge: Good  Cranial nerves:2-12: Grossly intact  Muscle strength, tone in all 4 extremities: proxilal weakness in righ leg about 3/5, otherwise  5/5 throughout. DTR in all 4 extremities: 1+  Babinski: Down response   Gait: Not able to stand alone because of the pain.   Cerebellar function: WNL  Sensation: Grossly intact  Straight leg raising test: Negative (more pain in the back and hips area)  Has severe limitation in the range of motions of his lumbar spine in all directions. Has  postural abnormality( Sagittal and coronal imbalances). Ac's test: positive in right    Reviewed MRI Type:  Film and Report    Lab Results   Component Value Date    WBC 8.9 04/21/2018    HGB 8.1 (L) 04/21/2018    HCT 24.1 (L) 04/21/2018     04/21/2018    CHOL 156 03/29/2018    TRIG 63 03/29/2018    HDL 68 03/29/2018    ALT 10 (L) 03/29/2018    AST 16 03/29/2018     03/29/2018    K 4.3 03/29/2018     03/29/2018    CREATININE 0.8 03/29/2018    BUN 15 03/29/2018    CO2 26 03/29/2018    LABA1C 5.2 09/05/2017       Assessment and Plan      Diagnosis Orders   1. Postlaminectomy syndrome  CT LUMBAR SPINE WO CONTRAST    NM BONE SCAN WHOLE BODY   2. Status post lumbar spinal fusion  CT LUMBAR SPINE WO CONTRAST    NM BONE SCAN WHOLE BODY   3. Chronic pain disorder  CT LUMBAR SPINE WO CONTRAST    NM BONE SCAN WHOLE BODY   4. Spondylosis of lumbar spine  CT LUMBAR SPINE WO CONTRAST    NM BONE SCAN WHOLE BODY   5. Chronic low back pain, unspecified back pain laterality, unspecified whether sciatica present  CT LUMBAR SPINE WO CONTRAST    NM BONE SCAN WHOLE BODY       -This is a 61year old femle with a complex lumbar spine issue due to a combination of severe spine degenerative disease,  SI joint disease, spine deformity, post laminectomy syndrome,  failed back syndrome, spinal deformity, adjustment segment disease and possible underlying spinal instability.     -Today, I had long discussion with patient regarding her overall symptoms and the findings of her neurological exam and her last lumbar spine MRI I told patient at this time and before finalize his recommendation and treatment plan I will request for patient:    · Lumbar spine CT for further evaluation of patient's lumbar spine bone anatomy. · Bone scan with focusing of SI joints.      - After the above are completed I

## 2021-12-08 NOTE — PROGRESS NOTES
[facility]  [address] [address]  Dept: [phone]  Dept Fax: [phone]      Patient Name:  Kostas Porter  Visit Date:  12/8/2021    HPI:     Mr. Jon Schulz is a 61 y.o. male that presents today at [facility] for evaluation of the following:      Chief Complaint   Patient presents with    New Patient     failed back surgical syndrome        HPI     This is a 74-year-old male with a progressive history of severe low back pain. Patient symptoms started several years ago and have been progressively gotten worse with time, especially over the last 7 months. Patient underwent lumbar spine surgery that included lumbar spine instrumentation and fusion about 3 years ago. Currently patient has severe low back pain that is up to 9/10. Patient's pain increases with activity and standing. Patient pain goes to both legs posterior aspect of the thighs. Patient stated that she feels weakness in her right leg. Patient denied any significant issue in controlling urination or bowel habits. Patient underwent lumbar spine injection in the past but without significant help. Medications:    Current Outpatient Medications:     gabapentin (NEURONTIN) 800 MG tablet, TAKE 1 TABLET BY MOUTH THREE TIMES A DAY, Disp: 90 tablet, Rfl: 2    tiZANidine (ZANAFLEX) 4 MG tablet, Take 1 tablet by mouth every 8 hours as needed (muscle spasms), Disp: 90 tablet, Rfl: 2    tapentadol (NUCYNTA) 100 MG TABS, Take 1 tablet by mouth 2 times daily as needed for Pain for up to 30 days. , Disp: 60 tablet, Rfl: 0    aspirin 81 MG chewable tablet, Take 1 tablet by mouth daily, Disp: 30 tablet, Rfl: 3    albuterol-ipratropium (COMBIVENT)  MCG/ACT inhaler, Inhale 2 puffs into the lungs every 6 hours as needed for Wheezing, Disp: , Rfl:     omeprazole (PRILOSEC) 40 MG delayed release capsule, Take 40 mg by mouth daily, Disp: , Rfl:     The patient is allergic to norco [hydrocodone-acetaminophen]. Past Medical History  Jennifer Franklin  has a past medical history of Anxiety, Arthritis, Bradycardia, Cervical spondylosis with myelopathy, COPD (chronic obstructive pulmonary disease) ([address] Depression, GERD (gastroesophageal reflux disease), Headache(784.0), Hyperlipidemia, Low back pain, Prolonged emergence from general anesthesia, Snoring, and Tobacco abuse. Past Surgical History  The patient  has a past surgical history that includes Colonoscopy (2014); Rotator cuff repair (Left, 01/2016); back surgery (11/2015); Upper gastrointestinal endoscopy; Cataract removal with implant (Bilateral); pr spine surgery procedure unlisted (N/A, 4/18/2018); Back Injection (Right, 8/3/2021); and Pain management procedure (Right, 9/28/2021). Family History  This patient's family history includes COPD in his sister; Cancer in his sister; Emphysema in his sister; Heart Disease in his father; Heart Surgery (age of onset: 61) in his father; High Blood Pressure in his father and mother. Social History  Jennifer Franklin  reports that he has been smoking cigarettes. He has a 20.00 pack-year smoking history. He has never used smokeless tobacco. He reports current alcohol use. He reports that he does not use drugs. Subjective:      Review of Systems   Constitutional: Negative for fever. HENT: Negative for congestion. Eyes: Negative for visual disturbance. Respiratory: Negative for chest tightness. Cardiovascular: Negative for chest pain. Gastrointestinal: Negative for abdominal pain. Genitourinary: Negative for difficulty urinating. Musculoskeletal: Positive for back pain, gait problem and neck pain. Skin: Negative for wound. Neurological: Positive for weakness. Psychiatric/Behavioral: Negative for confusion.        Objective:     BP (!) 142/88 (Site: Left Upper Arm, Position: Sitting, Cuff Size: Large Adult)   Pulse 72   Wt 213 lb (96.6 kg)   BMI CONTRAST    NM BONE SCAN WHOLE BODY       -This is a 61year old femle with a complex lumbar spine issue due to a combination of severe spine degenerative disease,  SI joint disease, spine deformity, post laminectomy syndrome,  failed back syndrome, spinal deformity, adjustment segment disease and possible underlying spinal instability.     -Today, I had long discussion with patient regarding her overall symptoms and the findings of her neurological exam and her last lumbar spine MRI I told patient at this time and before finalize his recommendation and treatment plan I will request for patient:    · Lumbar spine CT for further evaluation of patient's lumbar spine bone anatomy. · Bone scan with focusing of SI joints. - After the above are completed I will see the patient again for re-evaluation and to update her recommendations and treatment plan from neurosurgical perspective. - All questions and concerns were addressed and answered. - Patient was agreement with the above recommendations and treatment plan. -Based on patient symptoms, the medical history of her symptoms, the finding of her current neurological exam and the finding of her previous lumbar spinal studies, I believe that patient has 2 main treatment options (spinal cord stimulator trial versus extension of her previous lumbar spine instrumentation and fusion to T10 and pelvis). However, given the patient  has weakness in her proximal right leg I may lean towards considering extension of her lumbar spine instrumentation and fusion but the ferrous recommended treatment option. *Time spent with the patient was 45 minutes. More than 50% was spent counseling/coordinating the patient's care.         Electronically signed by Barbara Smith MD on 12/8/2021 at 10:21 AM

## 2021-12-08 NOTE — TELEPHONE ENCOUNTER
OARRS reviewed. UDS: Negative   Last seen: 11/8/2021.  Follow-up:   Future Appointments   Date Time Provider Elizabeth Nair   12/8/2021  9:30 AM Asem Otilia Bolt, MD Laymon Galeazzi [facility] [place]   1/31/2022 11:00 AM Linden Peterson DO [facility] [place] [place]

## 2021-12-22 ASSESSMENT — ENCOUNTER SYMPTOMS
CHEST TIGHTNESS: 0
ABDOMINAL PAIN: 0
BACK PAIN: 1

## 2021-12-23 ENCOUNTER — HOSPITAL ENCOUNTER (OUTPATIENT)
Dept: NUCLEAR MEDICINE | Age: 66
Discharge: HOME | End: 2021-12-23
Payer: MEDICARE

## 2021-12-23 ENCOUNTER — HOSPITAL ENCOUNTER (OUTPATIENT)
Dept: CT IMAGING | Age: 66
Discharge: HOME | End: 2021-12-23
Payer: MEDICARE

## 2021-12-23 DIAGNOSIS — G89.29 CHRONIC LOW BACK PAIN, UNSPECIFIED BACK PAIN LATERALITY, UNSPECIFIED WHETHER SCIATICA PRESENT: ICD-10-CM

## 2021-12-23 DIAGNOSIS — M47.816 SPONDYLOSIS OF LUMBAR SPINE: ICD-10-CM

## 2021-12-23 DIAGNOSIS — M96.1 POSTLAMINECTOMY SYNDROME: ICD-10-CM

## 2021-12-23 DIAGNOSIS — Z98.1 STATUS POST LUMBAR SPINAL FUSION: ICD-10-CM

## 2021-12-23 DIAGNOSIS — G89.4 CHRONIC PAIN DISORDER: ICD-10-CM

## 2021-12-23 DIAGNOSIS — M54.50 CHRONIC LOW BACK PAIN, UNSPECIFIED BACK PAIN LATERALITY, UNSPECIFIED WHETHER SCIATICA PRESENT: ICD-10-CM

## 2021-12-23 PROCEDURE — 72131 CT LUMBAR SPINE W/O DYE: CPT

## 2021-12-23 PROCEDURE — 3430000000 HC RX DIAGNOSTIC RADIOPHARMACEUTICAL: Performed by: NEUROLOGICAL SURGERY

## 2021-12-23 PROCEDURE — A9503 TC99M MEDRONATE: HCPCS | Performed by: NEUROLOGICAL SURGERY

## 2021-12-23 PROCEDURE — 78306 BONE IMAGING WHOLE BODY: CPT

## 2021-12-23 RX ORDER — TC 99M MEDRONATE 20 MG/10ML
29 INJECTION, POWDER, LYOPHILIZED, FOR SOLUTION INTRAVENOUS
Status: COMPLETED | OUTPATIENT
Start: 2021-12-23 | End: 2021-12-23

## 2021-12-23 RX ADMIN — TC 99M MEDRONATE 29 MILLICURIE: 20 INJECTION, POWDER, LYOPHILIZED, FOR SOLUTION INTRAVENOUS at 08:10

## 2022-01-04 ENCOUNTER — OFFICE VISIT (OUTPATIENT)
Dept: NEUROSURGERY | Age: 67
End: 2022-01-04
Payer: MEDICARE

## 2022-01-04 VITALS
HEIGHT: 70 IN | HEART RATE: 89 BPM | BODY MASS INDEX: 30.49 KG/M2 | SYSTOLIC BLOOD PRESSURE: 123 MMHG | DIASTOLIC BLOOD PRESSURE: 84 MMHG | WEIGHT: 212.96 LBS

## 2022-01-04 DIAGNOSIS — M47.816 SPONDYLOSIS OF LUMBAR SPINE: ICD-10-CM

## 2022-01-04 DIAGNOSIS — G89.4 CHRONIC PAIN DISORDER: ICD-10-CM

## 2022-01-04 DIAGNOSIS — Z98.1 STATUS POST LUMBAR SPINAL FUSION: ICD-10-CM

## 2022-01-04 DIAGNOSIS — Z01.818 PRE-OP TESTING: ICD-10-CM

## 2022-01-04 DIAGNOSIS — M54.50 CHRONIC LOW BACK PAIN, UNSPECIFIED BACK PAIN LATERALITY, UNSPECIFIED WHETHER SCIATICA PRESENT: ICD-10-CM

## 2022-01-04 DIAGNOSIS — M96.1 POSTLAMINECTOMY SYNDROME: ICD-10-CM

## 2022-01-04 DIAGNOSIS — G89.29 CHRONIC LOW BACK PAIN, UNSPECIFIED BACK PAIN LATERALITY, UNSPECIFIED WHETHER SCIATICA PRESENT: ICD-10-CM

## 2022-01-04 PROCEDURE — G8484 FLU IMMUNIZE NO ADMIN: HCPCS | Performed by: NEUROLOGICAL SURGERY

## 2022-01-04 PROCEDURE — G8417 CALC BMI ABV UP PARAM F/U: HCPCS | Performed by: NEUROLOGICAL SURGERY

## 2022-01-04 PROCEDURE — 4004F PT TOBACCO SCREEN RCVD TLK: CPT | Performed by: NEUROLOGICAL SURGERY

## 2022-01-04 PROCEDURE — 99214 OFFICE O/P EST MOD 30 MIN: CPT | Performed by: NEUROLOGICAL SURGERY

## 2022-01-04 PROCEDURE — 3017F COLORECTAL CA SCREEN DOC REV: CPT | Performed by: NEUROLOGICAL SURGERY

## 2022-01-04 PROCEDURE — G8427 DOCREV CUR MEDS BY ELIG CLIN: HCPCS | Performed by: NEUROLOGICAL SURGERY

## 2022-01-04 RX ORDER — LISINOPRIL 5 MG/1
5 TABLET ORAL DAILY
Status: ON HOLD | COMMUNITY
Start: 2021-03-15 | End: 2022-09-07

## 2022-01-04 RX ORDER — TIZANIDINE 4 MG/1
4 TABLET ORAL 2 TIMES DAILY PRN
COMMUNITY
Start: 2021-07-06 | End: 2022-03-03

## 2022-01-04 NOTE — PROGRESS NOTES
Maty Rosario   [id number]   1955       Maty Rosario  presented today for a follow up visit. Notes from patient's previous visit:      This is a 62-year-old male with a progressive history of severe low back pain. Patient symptoms started several years ago and have been progressively gotten worse with time, especially over the last 7 months. Patient underwent lumbar spine surgery that included lumbar spine instrumentation and fusion about 3 years ago. Currently patient has severe low back pain that is up to 9/10. Patient's pain increases with activity and standing. Patient pain goes to both legs posterior aspect of the thighs. Patient stated that she feels weakness in her right leg. Patient denied any significant issue in controlling urination or bowel habits. Patient underwent lumbar spine injection in the past but without significant help. In today visit:      Patient denied any significant changes in his symptoms since last visit. Also today neurological exam did not show any significant changes comparing with his last visit. Patient underwent lumbar spine CT that showed:       1. Extensive postoperative changes between L2 and S1.   2. There is mild canal and moderate bilateral foraminal stenosis at L1-2.   3. There is mild canal and mild-to-moderate bilateral foraminal stenosis at T11-12 and T12-L1. 4. There is mild-to-moderate bilateral foraminal stenosis with no canal stenosis at L5-S1.   5. There is no recurrent disc herniation, canal or foraminal stenosis at L2-3, L3-4 and L4-5.   6. There is degenerative change involving the sacroiliac joints bilaterally. 7. There is atherosclerotic calcification in the abdominal aorta and iliac arteries. Patient underwent lumbar spine MRI that showed:     1.  Extensive postoperative changes between L2 and S1.   2. There is mild canal and moderate bilateral foraminal stenosis at L1-2.   3. There is mild-to-moderate bilateral foraminal stenosis with no canal stenosis at L4-5 and L5-S1.   4. There is mild bilateral foraminal stenosis with no canal stenosis at L2-3 and L3-4.   5. There is probable arachnoiditis in the lower thecal sac. 6. There is degenerative change involving the sacroiliac joints bilaterally. 7. There is atrophy involving the paraspinal muscles posteriorly. Assessment and Plan:    -This is a 62 year old femle with a complex lumbar spine issue due to a combination of severe spine degenerative disease,  SI joint disease, spine deformity, post laminectomy syndrome,  failed back syndrome, spinal deformity, adjustment segment disease and possible underlying spinal instability.     -Manjit Caitlyn had another long discussion with patient regarding his overall symptoms and the findings of his neurological exam and  lumbar spine imaging studies.    -I told the patient that based on his current symptoms, the history of his symptoms, the findings is his physical exam and the findings of his spine imaging studies I would recommend for the patient a surgical intervention in the form of:\" Revision of previous lumbar spine decompression, instrumentation and fusion with extension to the pelvis T10 and pelvis+ possible interbody cage placement as indicated during the surgery\". -I explained again to the patient the recommended surgical intervention in detail including the associated risks and benefits, as well as, the alternative treatment options (which are mainly to have another round of lumbar spine injections or spinal cord stimulator trail). - I discussed with patient  the possible complication of surgery in detail  including excessive blood loss requiring transfusion, infection that may become meningitis, problem with heart lung and kidney as a result of general anesthesia such as heart attack, pneumonia, kidney shutdown and stroke.  I also discussed the possible complication of the operations in and around the spine such as, death, paralysis, weakness on one side or the other of the body, decreased sensation or pain on one side or the other of the body, inability to control bowel/bladder, problems with sexual function, postoperative meningitis, postoperative development of a blood clot that may require another operation, leakage of fluid from the wound that may require another operation. The unlikely event of blindness following surgery in the prone position was also discussed with the patient. The patient understands that no guarantee can be made regarding the extent of pre op symptoms.  -I told the patient that he needs to keep in mind that he may need another  spine intervention in the future is based of the development of his symptoms and the other degenerative pathological process at other level of his lumbar spine.  -I explained to the patient and his wife that we may need to stage his surgery based on his instability during the surgery.  -The above discussion was carried out in front of neurosurgery LARS GEORGE. -All questions and concerns were addressed and answered.  -Patient agreed again to undergo the recommended surgical intervention and he signed the surgical consent.  -The pal now for Yomaira Renee is to schedule him for surgery after obtaining the necessary preop medical and cardiology clearance. *Time spent with the patient was 35 minutes.  More than 50% was spent counseling/coordinating     Juanita Lam MD

## 2022-01-12 ENCOUNTER — HOSPITAL ENCOUNTER (OUTPATIENT)
Age: 67
Discharge: HOME | End: 2022-01-12
Payer: MEDICARE

## 2022-01-12 ENCOUNTER — OFFICE VISIT (OUTPATIENT)
Dept: CARDIOLOGY CLINIC | Age: 67
End: 2022-01-12
Payer: MEDICARE

## 2022-01-12 VITALS
DIASTOLIC BLOOD PRESSURE: 73 MMHG | BODY MASS INDEX: 30.03 KG/M2 | HEART RATE: 70 BPM | HEIGHT: 70 IN | SYSTOLIC BLOOD PRESSURE: 128 MMHG | WEIGHT: 209.8 LBS

## 2022-01-12 DIAGNOSIS — Z01.818 PRE-OP EVALUATION: Primary | ICD-10-CM

## 2022-01-12 DIAGNOSIS — Z98.890 S/P CARDIAC CATH: ICD-10-CM

## 2022-01-12 DIAGNOSIS — R94.31 ABNORMAL EKG: ICD-10-CM

## 2022-01-12 DIAGNOSIS — Z82.49 FAMILY HISTORY OF EARLY CAD: ICD-10-CM

## 2022-01-12 DIAGNOSIS — R06.02 SOB (SHORTNESS OF BREATH) ON EXERTION: ICD-10-CM

## 2022-01-12 DIAGNOSIS — E78.5 DYSLIPIDEMIA: ICD-10-CM

## 2022-01-12 DIAGNOSIS — Z72.0 TOBACCO ABUSE: ICD-10-CM

## 2022-01-12 DIAGNOSIS — Z91.14 NONCOMPLIANCE WITH MEDICATION REGIMEN: ICD-10-CM

## 2022-01-12 DIAGNOSIS — Z87.898 HISTORY OF CHEST PAIN: ICD-10-CM

## 2022-01-12 PROBLEM — Z91.148 NONCOMPLIANCE WITH MEDICATION REGIMEN: Status: ACTIVE | Noted: 2022-01-12

## 2022-01-12 LAB
ALBUMIN SERPL-MCNC: 4.3 G/DL (ref 3.5–5.1)
ALP BLD-CCNC: 98 U/L (ref 38–126)
ALT SERPL-CCNC: 15 U/L (ref 11–66)
ANION GAP SERPL CALCULATED.3IONS-SCNC: 7 MEQ/L (ref 8–16)
APTT: 33.6 SECONDS (ref 22–38)
AST SERPL-CCNC: 15 U/L (ref 5–40)
BASOPHILS # BLD: 1 %
BASOPHILS ABSOLUTE: 0.1 THOU/MM3 (ref 0–0.1)
BILIRUB SERPL-MCNC: 0.4 MG/DL (ref 0.3–1.2)
BILIRUBIN DIRECT: < 0.2 MG/DL (ref 0–0.3)
BUN BLDV-MCNC: 20 MG/DL (ref 7–22)
CALCIUM SERPL-MCNC: 9.3 MG/DL (ref 8.5–10.5)
CHLORIDE BLD-SCNC: 104 MEQ/L (ref 98–111)
CHOLESTEROL, TOTAL: 195 MG/DL (ref 100–199)
CO2: 26 MEQ/L (ref 23–33)
CREAT SERPL-MCNC: 0.8 MG/DL (ref 0.4–1.2)
EOSINOPHIL # BLD: 4.1 %
EOSINOPHILS ABSOLUTE: 0.4 THOU/MM3 (ref 0–0.4)
ERYTHROCYTE [DISTWIDTH] IN BLOOD BY AUTOMATED COUNT: 14.9 % (ref 11.5–14.5)
ERYTHROCYTE [DISTWIDTH] IN BLOOD BY AUTOMATED COUNT: 49.9 FL (ref 35–45)
GFR SERPL CREATININE-BSD FRML MDRD: > 90 ML/MIN/1.73M2
GLUCOSE BLD-MCNC: 103 MG/DL (ref 70–108)
HCT VFR BLD CALC: 48.8 % (ref 42–52)
HDLC SERPL-MCNC: 66 MG/DL
HEMOGLOBIN: 15.9 GM/DL (ref 14–18)
IMMATURE GRANS (ABS): 0.03 THOU/MM3 (ref 0–0.07)
IMMATURE GRANULOCYTES: 0.3 %
INR BLD: 0.9 (ref 0.85–1.13)
LDL CHOLESTEROL CALCULATED: 111 MG/DL
LYMPHOCYTES # BLD: 30.6 %
LYMPHOCYTES ABSOLUTE: 2.8 THOU/MM3 (ref 1–4.8)
MCH RBC QN AUTO: 29.8 PG (ref 26–33)
MCHC RBC AUTO-ENTMCNC: 32.6 GM/DL (ref 32.2–35.5)
MCV RBC AUTO: 91.6 FL (ref 80–94)
MONOCYTES # BLD: 10.6 %
MONOCYTES ABSOLUTE: 1 THOU/MM3 (ref 0.4–1.3)
MRSA NASAL SCREEN RT-PCR: NEGATIVE
NUCLEATED RED BLOOD CELLS: 0 /100 WBC
PLATELET # BLD: 239 THOU/MM3 (ref 130–400)
PMV BLD AUTO: 10.3 FL (ref 9.4–12.4)
POTASSIUM SERPL-SCNC: 4.4 MEQ/L (ref 3.5–5.2)
RBC # BLD: 5.33 MILL/MM3 (ref 4.7–6.1)
SEG NEUTROPHILS: 53.4 %
SEGMENTED NEUTROPHILS ABSOLUTE COUNT: 5 THOU/MM3 (ref 1.8–7.7)
SODIUM BLD-SCNC: 137 MEQ/L (ref 135–145)
STAPH AUREUS SCREEN RT-PCR: POSITIVE
TOTAL PROTEIN: 7.4 G/DL (ref 6.1–8)
TRIGL SERPL-MCNC: 91 MG/DL (ref 0–199)
WBC # BLD: 9.3 THOU/MM3 (ref 4.8–10.8)

## 2022-01-12 PROCEDURE — 99214 OFFICE O/P EST MOD 30 MIN: CPT | Performed by: INTERNAL MEDICINE

## 2022-01-12 PROCEDURE — 85610 PROTHROMBIN TIME: CPT

## 2022-01-12 PROCEDURE — 82248 BILIRUBIN DIRECT: CPT

## 2022-01-12 PROCEDURE — 93000 ELECTROCARDIOGRAM COMPLETE: CPT | Performed by: INTERNAL MEDICINE

## 2022-01-12 PROCEDURE — 87081 CULTURE SCREEN ONLY: CPT

## 2022-01-12 PROCEDURE — 80053 COMPREHEN METABOLIC PANEL: CPT

## 2022-01-12 PROCEDURE — 3017F COLORECTAL CA SCREEN DOC REV: CPT | Performed by: INTERNAL MEDICINE

## 2022-01-12 PROCEDURE — G8484 FLU IMMUNIZE NO ADMIN: HCPCS | Performed by: INTERNAL MEDICINE

## 2022-01-12 PROCEDURE — 85025 COMPLETE CBC W/AUTO DIFF WBC: CPT

## 2022-01-12 PROCEDURE — G8417 CALC BMI ABV UP PARAM F/U: HCPCS | Performed by: INTERNAL MEDICINE

## 2022-01-12 PROCEDURE — 80061 LIPID PANEL: CPT

## 2022-01-12 PROCEDURE — 87641 MR-STAPH DNA AMP PROBE: CPT

## 2022-01-12 PROCEDURE — 87640 STAPH A DNA AMP PROBE: CPT

## 2022-01-12 PROCEDURE — G8427 DOCREV CUR MEDS BY ELIG CLIN: HCPCS | Performed by: INTERNAL MEDICINE

## 2022-01-12 PROCEDURE — 4004F PT TOBACCO SCREEN RCVD TLK: CPT | Performed by: INTERNAL MEDICINE

## 2022-01-12 PROCEDURE — 85730 THROMBOPLASTIN TIME PARTIAL: CPT

## 2022-01-12 PROCEDURE — 36415 COLL VENOUS BLD VENIPUNCTURE: CPT

## 2022-01-12 RX ORDER — OXYCODONE HCL 10 MG/1
10 TABLET, FILM COATED, EXTENDED RELEASE ORAL EVERY 12 HOURS
COMMUNITY
End: 2022-02-18

## 2022-01-12 NOTE — PROGRESS NOTES
Chief Complaint   Patient presents with    Pre-op Exam       Had   Lumbar Laminectomy with fusion, Dr. Virgilio Gonzalez  On 4/2018. last seen 07/2020  Here for pre op eval  Pre-op exam for back surgery with Dr. Deana Fuller not yet scheduled. Hx of chest pain July 2020 and stress test and echo was ordered and was not doen for unclear reason and did not follow up  No cp since then  EKG done today. Sob on exertion- no changes  Copd hx    Denied chest pain,  palpitation, dizziness or edema    Run out of lipitor for a while     Smokes 1/2 ppd for 40 yrs    FHX  Father had CABG at his late 63's          Patient Active Problem List   Diagnosis    Cervical spondylosis with myelopathy    COPD (chronic obstructive pulmonary disease) (HCC)    Tobacco abuse    Bradycardia    Low back pain    Headache    Depression    Anxiety    Near syncope    Sinus bradycardia    Dizziness on standing    SOB (shortness of breath) on exertion    S/P cardiac cath 10/18/17-LM-P, LAD MID AND DIST 40%, LCX MID 40%, RCA MID 50%, RUHTHL79%, EDP 6 MMHG, EF 60%    Pre-op evaluation for back surgery    Dyslipidemia    Lumbar back pain with radiculopathy affecting left lower extremity    Spinal stenosis of lumbar region with neurogenic claudication    Chest pain in adult    Abnormal EKG    Family history of early CAD    History of chest pain a while back and did not complete the work up   Bassem Bryan Noncompliance with medication regimen and F/u       Past Surgical History:   Procedure Laterality Date    BACK INJECTION Right 8/3/2021    right sacroiliac joint injection performed by Gerry Driver DO at [address]  11/2015    cervical fusion C3-5?  Dr. Nella Boast Bilateral     Dr. Lissy Ledezma  2014   Марина Bal Right 9/28/2021    right cluneal nerve block performed by Gerry Driver DO at [address] [place] [address] [place] UNLISTED N/A 4/18/2018    LUMBAR LAMINECTOMY WITH PSF L2-S1, PLIF L5-S1 WITH SOLERA 5.5 CAPSTONE AND OPAL DBM LOCAL BONE GRAFGING performed by Devin Marcial MD at [address] Left 01/2016    Dr. Amie Foster @ [address] [place]         Allergies   Allergen Reactions    Norco [Hydrocodone-Acetaminophen] Hives and Itching        Family History   Problem Relation Age of Onset    High Blood Pressure Mother     High Blood Pressure Father     Heart Disease Father         CAD    Heart Surgery Father 61        CAGB    COPD Sister     Emphysema Sister     Cancer Sister         Throat CA/bone cancer        Social History     Socioeconomic History    Marital status:      Spouse name: Not on file    Number of children: Not on file    Years of education: Not on file    Highest education level: Not on file   Occupational History    Not on file   Tobacco Use    Smoking status: Current Every Day Smoker     Packs/day: 0.50     Years: 40.00     Pack years: 20.00     Types: Cigarettes    Smokeless tobacco: Never Used   Vaping Use    Vaping Use: Never used   Substance and Sexual Activity    Alcohol use: Yes     Alcohol/week: 0.0 standard drinks     Comment: occasionnally    Drug use: No    Sexual activity: Not on file   Other Topics Concern    Not on file   Social History Narrative    Not on file     Social Determinants of Health     Financial Resource Strain:     Difficulty of Paying Living Expenses: Not on file   Food Insecurity:     Worried About Running Out of Food in the Last Year: Not on file    Amina of Food in the Last Year: Not on file   Transportation Needs:     Lack of Transportation (Medical): Not on file    Lack of Transportation (Non-Medical):  Not on file   Physical Activity:     Days of Exercise per Week: Not on file    Minutes of Exercise per Session: Not on file   Stress:     Feeling of Stress : Not on file   Social Connections:     Frequency of Communication with Friends and Family: Not on file    Frequency of Social Gatherings with Friends and Family: Not on file    Attends Shinto Services: Not on file    Active Member of Clubs or Organizations: Not on file    Attends Club or Organization Meetings: Not on file    Marital Status: Not on file   Intimate Partner Violence:     Fear of Current or Ex-Partner: Not on file    Emotionally Abused: Not on file    Physically Abused: Not on file    Sexually Abused: Not on file   Housing Stability:     Unable to Pay for Housing in the Last Year: Not on file    Number of [place] in the Last Year: Not on file    Unstable Housing in the Last Year: Not on file       Current Outpatient Medications   Medication Sig Dispense Refill    oxyCODONE (OXYCONTIN) 10 MG extended release tablet Take 10 mg by mouth every 12 hours.  tiZANidine (ZANAFLEX) 4 MG tablet Take 4 mg by mouth 2 times daily as needed      lisinopril (PRINIVIL;ZESTRIL) 5 MG tablet Take 5 mg by mouth daily      aspirin 81 MG chewable tablet Take 1 tablet by mouth daily 30 tablet 3    albuterol-ipratropium (COMBIVENT)  MCG/ACT inhaler Inhale 2 puffs into the lungs every 6 hours as needed for Wheezing       gabapentin (NEURONTIN) 800 MG tablet TAKE 1 TABLET BY MOUTH THREE TIMES A DAY 90 tablet 2    omeprazole (PRILOSEC) 40 MG delayed release capsule Take 40 mg by mouth daily       No current facility-administered medications for this visit. Review of Systems -     General ROS: negative  Psychological ROS: negative  Hematological and Lymphatic ROS: No history of blood clots or bleeding disorder.    Respiratory ROS: no cough, shortness of breath, or wheezing  Cardiovascular ROS: no chest pain or dyspnea on exertion  Gastrointestinal ROS: negative  Genito-Urinary ROS: no dysuria, trouble voiding, or hematuria  Musculoskeletal ROS: negative  Neurological ROS: no TIA or stroke symptoms  Dermatological ROS: negative      Blood pressure 128/73, pulse 70, height 5' 10\" (1.778 m), weight 209 lb 12.8 oz (95.2 kg). Physical Examination:    General appearance - alert, well appearing, and in no distress  Mental status - alert, oriented to person, place, and time  Neck - supple, no significant adenopathy, no JVD, or carotid bruits  Chest - clear to auscultation, no wheezes, rales or rhonchi, symmetric air entry  Heart - normal rate, regular rhythm, normal S1, S2, no murmurs, rubs, clicks or gallops  Abdomen - soft, nontender, nondistended, no masses or organomegaly  Neurological - alert, oriented, normal speech, no focal findings or movement disorder noted  Musculoskeletal - no joint tenderness, deformity or swelling  Extremities - peripheral pulses normal, no pedal edema, no clubbing or cyanosis  Skin - normal coloration and turgor, no rashes, no suspicious skin lesions noted    Lab  No results for input(s): CKTOTAL, CKMB, CKMBINDEX, TROPONINI in the last 72 hours.   CBC:   Lab Results   Component Value Date    WBC 8.9 04/21/2018    RBC 2.70 04/21/2018    HGB 8.1 04/21/2018    HCT 24.1 04/21/2018    MCV 89.0 04/21/2018    MCH 30.0 04/21/2018    MCHC 33.7 04/21/2018    RDW 14.3 04/21/2018     04/21/2018    MPV 8.9 04/21/2018     BMP:    Lab Results   Component Value Date     03/29/2018    K 4.3 03/29/2018     03/29/2018    CO2 26 03/29/2018    BUN 15 03/29/2018    LABALBU 4.0 03/29/2018    CREATININE 0.8 03/29/2018    CALCIUM 9.1 03/29/2018    LABGLOM >90 03/29/2018    GLUCOSE 91 03/29/2018     Hepatic Function Panel:    Lab Results   Component Value Date    ALKPHOS 75 03/29/2018    ALT 10 03/29/2018    AST 16 03/29/2018    PROT 7.5 03/29/2018    BILITOT 0.4 03/29/2018    BILIDIR <0.2 03/29/2018    LABALBU 4.0 03/29/2018     Magnesium:  No results found for: MG  Warfarin PT/INR:  No components found for: PTPATWAR, PTINRWAR  HgBA1c:    Lab Results   Component Value Date    LABA1C 5.2 09/05/2017     FLP:    Lab Results Component Value Date    TRIG 63 03/29/2018    HDL 68 03/29/2018    LDLCALC 75 03/29/2018     TSH:  No results found for: TSH    EKG 3/27/18  Sinus  Rhythm   Low voltage in limb leads. ABNORMAL     Cath 10/2017  Procedure Summary  LM-PATENT,  LAD MID AND DISTAL 40%  LCX MID 40%  RCA PROX 30%, MID 50 % AND DISTAL 40%  Normal LV systolic function. LVEF approximately 60% . LV size - normal.  EDP 6 mmhg  No gradient  Recommendations  Continue with aggressive risk factor modification and medical therapy. Estimated Blood Loss: 10 ml. Complications: No complications. Signatures  Electronically signed by Anmol Luke MD (Performing Physician) on 10/18/2017 at 15:37      ekg 7/20/2020  Sinus  Rhythm   WITHIN NORMAL LIMITS    EKG Jan 12, 2022  NSR, Low voltage  PRWP  Can not rule out anterior infarct  Compared to previous ekg from 2020 low voltage and prwp is new      Assessment     Diagnosis Orders   1. Pre-op evaluation for back surgery  Lipid Panel    Hepatic Function Panel    ECHO Complete 2D W Doppler W Color    Stress test, lexiscan   2. SOB (shortness of breath) on exertion  Lipid Panel    Hepatic Function Panel    ECHO Complete 2D W Doppler W Color    Stress test, lexiscan   3. Abnormal EKG  Lipid Panel    Hepatic Function Panel    ECHO Complete 2D W Doppler W Color    Stress test, lexiscan   4. Tobacco abuse  Lipid Panel    Hepatic Function Panel    Stress test, lexiscan   5. Family history of early CAD  Lipid Panel    Hepatic Function Panel    Stress test, lexiscan   6. History of chest pain a while back and did not complete the work up  Lipid Panel    Hepatic Function Panel    Stress test, lexiscan   7. S/P cardiac cath 10/18/17-LM-P, LAD MID AND DIST 40%, LCX MID 40%, RCA MID 50%, LTXZFH37%, EDP 6 MMHG, EF 60%  Lipid Panel    Hepatic Function Panel    Stress test, lexiscan   8. Dyslipidemia  Lipid Panel    Hepatic Function Panel    Stress test, lexiscan   9.  Noncompliance with medication regimen and F/u Lipid Panel    Hepatic Function Panel    Stress test, lexiscan         Plan     meds and labs reviewed      Continue the current treatment and with constant vigilance to changes in symptoms and also any potential side effects. Return for care or seek medical attention immediately if symptoms got worse and/or develop new symptoms.     Pre op eval for back surgery  Sob on exertion  Hx of cp a while back did not compete work up  Ibercheck  Need echo  lexisc nuc- can not walk on threadmill for marked COPD  Cont asa   To resume lipitor after LP and LFT and pat agreed  If the test okay may proceed with low to mod risk    Hx of Nonobstructive CAD 40 to 50%- 2017  Cont  lipitor 20      Noncompliance advised    Lipid panel and liver function test today    D/w the pat plan of care in detail    RTC 6 months    Endy Rollins, [facility]

## 2022-01-14 LAB — MRSA SCREEN: NORMAL

## 2022-02-10 ENCOUNTER — TELEPHONE (OUTPATIENT)
Dept: PHYSICAL MEDICINE AND REHAB | Age: 67
End: 2022-02-10

## 2022-02-10 DIAGNOSIS — M47.12 CERVICAL SPONDYLOSIS WITH MYELOPATHY: ICD-10-CM

## 2022-02-10 DIAGNOSIS — M54.16 LUMBAR BACK PAIN WITH RADICULOPATHY AFFECTING LEFT LOWER EXTREMITY: ICD-10-CM

## 2022-02-10 DIAGNOSIS — M48.062 SPINAL STENOSIS OF LUMBAR REGION WITH NEUROGENIC CLAUDICATION: ICD-10-CM

## 2022-02-10 RX ORDER — GABAPENTIN 800 MG/1
TABLET ORAL
Qty: 90 TABLET | Refills: 2 | Status: SHIPPED | OUTPATIENT
Start: 2022-02-10 | End: 2022-05-13 | Stop reason: DRUGHIGH

## 2022-02-11 PROBLEM — Z01.818 PRE-OP EVALUATION: Status: RESOLVED | Noted: 2018-03-27 | Resolved: 2022-02-11

## 2022-02-18 ENCOUNTER — OFFICE VISIT (OUTPATIENT)
Dept: PHYSICAL MEDICINE AND REHAB | Age: 67
End: 2022-02-18
Payer: MEDICARE

## 2022-02-18 VITALS
WEIGHT: 209 LBS | BODY MASS INDEX: 29.92 KG/M2 | HEIGHT: 70 IN | DIASTOLIC BLOOD PRESSURE: 78 MMHG | SYSTOLIC BLOOD PRESSURE: 126 MMHG

## 2022-02-18 DIAGNOSIS — M54.16 LUMBAR BACK PAIN WITH RADICULOPATHY AFFECTING LEFT LOWER EXTREMITY: ICD-10-CM

## 2022-02-18 DIAGNOSIS — M47.12 CERVICAL SPONDYLOSIS WITH MYELOPATHY: ICD-10-CM

## 2022-02-18 DIAGNOSIS — M62.838 SPASM OF MUSCLE: ICD-10-CM

## 2022-02-18 DIAGNOSIS — M48.062 SPINAL STENOSIS OF LUMBAR REGION WITH NEUROGENIC CLAUDICATION: ICD-10-CM

## 2022-02-18 DIAGNOSIS — G89.29 OTHER CHRONIC PAIN: Primary | ICD-10-CM

## 2022-02-18 DIAGNOSIS — M96.1 FAILED BACK SURGICAL SYNDROME: ICD-10-CM

## 2022-02-18 PROCEDURE — G8427 DOCREV CUR MEDS BY ELIG CLIN: HCPCS | Performed by: NURSE PRACTITIONER

## 2022-02-18 PROCEDURE — G8484 FLU IMMUNIZE NO ADMIN: HCPCS | Performed by: NURSE PRACTITIONER

## 2022-02-18 PROCEDURE — 3017F COLORECTAL CA SCREEN DOC REV: CPT | Performed by: NURSE PRACTITIONER

## 2022-02-18 PROCEDURE — G8417 CALC BMI ABV UP PARAM F/U: HCPCS | Performed by: NURSE PRACTITIONER

## 2022-02-18 PROCEDURE — 99213 OFFICE O/P EST LOW 20 MIN: CPT | Performed by: NURSE PRACTITIONER

## 2022-02-18 PROCEDURE — 4004F PT TOBACCO SCREEN RCVD TLK: CPT | Performed by: NURSE PRACTITIONER

## 2022-02-18 NOTE — PROGRESS NOTES
Chronic Pain/PM&R Clinic Note     Encounter Date: 2/18/22    Subjective:   Chief Complaint:   Chief Complaint   Patient presents with    Follow-up     12 week fu       History of Present Illness:   Sally Taylor is a 61 y.o. male seen in the clinic initially on 07/19/21 upon request from 2020 Alena Koo MD for his history of chronic low back pain. He has a medical history of previous L2-S1 lumbar decompression/fusion by Dr. Olga Alonzo in 2018, COPD, and anxiety/depression. He has been dealing with chronic low back and right-sided buttocks pain since his back surgery in 2018. He describes his pain to be a constant 9/10 stabbing, pins/needles, burning pain. States that the buttocks pain will radiate to the lateral aspect of the hip and down the lateral aspect and posterior aspect of the thigh. He states his pain is worse with any activity where he is upright walking. His pain is improved with rest and medications. He feels like the right leg is weaker than the left but denies any associated numbness/tingling, saddle anesthesia, bowel/bladder incontinence. He states that he has seen previous pain management (Dr. Tato Ingram). He states that he had a couple different injections including SI joint injection, lumbar epidural steroid injections, and facet injections. He feels like injections were not helping him out much. Today, 2/18/2022, patient presents for planned follow-up for chronic low back pain. Patient reports he is not having any benefit from taking the Vene 89. He states he has been taking consistently while his last dose was 3 days ago. Patient did see Dr. aMlick Gabriel with neurosurgery and they are planning to extend his fusion to T11 or T12. Patient states he needs to have his stress test completed before scheduling the surgery. He did have a stress test planned but due to bad weather it was canceled. He states he will go over to cardiology to reschedule today.   Patient states he has not been doing physical therapy due to wanting to complete the back surgery first.  Patient is asking if he can go back to oxycodone for pain control. Patient denies any new symptoms of worsening radiating leg pain, new focal leg weakness, new leg paresthesias, saddle anesthesia, bowel or bladder incontinence. History of Interventions:   Surgery: Previous L2-S1 decompression/fusion in 2018 (Dr. Delores Vences)  Injections: Multiple in past (Dr. Jacob Tobias)  R SI joint inj (8/3/21) - no relief  R cluneal nerve block (9/28/21) - no relief    Current Treatment Medications:   Gabapentin 800 mg TID  Tizanidine 4 mg PRN  Meloxciam 15 mg daily  Nucynta 100mg BID    Historical Treatment Medications:   Norco - hives, itching  Tramadol - ineffective  Naproxen - ineffective   Lyrica - ineffective     Imaging:  XR L-spine (4/18/18)    LUMBAR SPINE 2 VIEWS:       CLINICAL INFORMATION: Back pain. Lumbar fusion.       COMPARISON: Earlier film same date, 1135 hours       TECHNIQUE: Standard AP and crossfire lateral views of lumbar spine were obtained.       FINDINGS: Posterior lumbar fusion has been performed from L2-S1. Metallic pedicle screws and rods are present. A disc spacer device has been inserted at the L5-S1 level. Lumbar vertebra are normally aligned.               Impression   Postop appearance lumbar spine. Past Medical History:   Diagnosis Date    Anxiety     Arthritis     Bradycardia     HR 50 on preop EKG    Cervical spondylosis with myelopathy     COPD (chronic obstructive pulmonary disease) (HCC)     breathing worse in the heat    Depression     GERD (gastroesophageal reflux disease)     Headache(784.0)     Hyperlipidemia     Low back pain     was on Xanax from pain clinic - no longer has script    Prolonged emergence from general anesthesia     Snoring     no apnea, BMI 27, neck circ. 15 inches, will wake coughing, no choking, no daytime somnolence    Tobacco abuse        Past Surgical History:   Procedure Laterality Date    BACK INJECTION Right 8/3/2021    right sacroiliac joint injection performed by Gerry Driver DO at [address]  11/2015    cervical fusion C3-5? Dr. Nella Boast Bilateral     Dr. Lissy Ledezma  2014    PAIN MANAGEMENT PROCEDURE Right 9/28/2021    right cluneal nerve block performed by Gerry Driver DO at [facility] [place] UNLISTED N/A 4/18/2018    LUMBAR LAMINECTOMY WITH PSF L2-S1, PLIF L5-S1 WITH SOLERA 5.5 CAPSTONE AND OPAL [place] performed by Anupama Gomes MD at [facility] Left 01/2016    [facility]    UPPER GASTROINTESTINAL ENDOSCOPY         Family History   Problem Relation Age of Onset    High Blood Pressure Mother     High Blood Pressure Father     Heart Disease Father         CAD    Heart Surgery Father 61        CAGB    COPD Sister     Emphysema Sister     Cancer Sister         Throat CA/bone cancer       Social History     Socioeconomic History    Marital status:      Spouse name: Not on file    Number of children: Not on file    Years of education: Not on file    Highest education level: Not on file   Occupational History    Not on file   Tobacco Use    Smoking status: Current Every Day Smoker     Packs/day: 0.50     Years: 40.00     Pack years: 20.00     Types: Cigarettes    Smokeless tobacco: Never Used   Vaping Use    Vaping Use: Never used   Substance and Sexual Activity    Alcohol use:  Yes     Alcohol/week: 0.0 standard drinks     Comment: occasionnally    Drug use: No    Sexual activity: Not on file   Other Topics Concern    Not on file   Social History Narrative    Not on file     Social Determinants of Health     Financial Resource Strain:     Difficulty of Paying Living Expenses: Not on file   Food Insecurity:     Worried About Running Out of Food in the Last Year: Not on file    [address] in the Last Year: Not on file   Transportation Needs:     Lack of Transportation (Medical): Not on file    Lack of Transportation (Non-Medical):  Not on file   Physical Activity:     Days of Exercise per Week: Not on file    Minutes of Exercise per Session: Not on file   Stress:     Feeling of Stress : Not on file   Social Connections:     Frequency of Communication with Friends and Family: Not on file    Frequency of Social Gatherings with Friends and Family: Not on file    Attends [employer] Services: Not on file    Active Member of Clubs or Organizations: Not on file    Attends Club or Organization Meetings: Not on file    Marital Status: Not on file   Intimate Partner Violence:     Fear of Current or Ex-Partner: Not on file    Emotionally Abused: Not on file    Physically Abused: Not on file    Sexually Abused: Not on file   Housing Stability:     Unable to Pay for Housing in the Last Year: Not on file    Number of Places Lived in the Last Year: Not on file    Unstable Housing in the Last Year: Not on file       Medications & Allergies:   Current Outpatient Medications   Medication Instructions    albuterol-ipratropium (COMBIVENT)  MCG/ACT inhaler 2 puffs, Inhalation, EVERY 6 HOURS PRN    aspirin 81 mg, Oral, DAILY    gabapentin (NEURONTIN) 800 MG tablet TAKE 1 TABLET BY MOUTH THREE TIMES A DAY    lisinopril (PRINIVIL;ZESTRIL) 5 mg, Oral, DAILY    omeprazole (PRILOSEC) 40 mg, Oral, DAILY    oxyCODONE (OXYCONTIN) 10 mg, Oral, EVERY 12 HOURS    tiZANidine (ZANAFLEX) 4 mg, Oral, 2 TIMES DAILY PRN       Allergies   Allergen Reactions    Norco [Hydrocodone-Acetaminophen] Hives and Itching       Review of Systems:   Constitutional: negative for weight changes or fevers  Genitourinary: negative for bowel/bladder incontinence   Musculoskeletal: positive for low back and right hip pain  Neurological: negative for any leg weakness or numbness/tingling  Behavioral/Psych: positive for anxiety/depression All other systems reviewed and are negative    Objective:     Vitals:    02/18/22 0811   BP: 126/78       Constitutional: Pleasant, no acute distress   Head: Normocephalic, atraumatic   Eyes: Conjunctivae normal   Neck: Supple, symmetrical   Lungs: Normal respiratory effort, non-labored breathing   Cardiovascular: Limbs warm and well perfused   Abdomen: Non-protruded   Musculoskeletal: Muscle bulk symmetric, no atrophy, no gross deformities   · Lumbar Spine: ROM reduced in extension. Lumbar paraspinals tender bilaterally. Seated SLR equivocal on right. DAVID positive right. GAENSLEN positive right. SI joint distraction test positive on right. Positive facet loading bilaterally. Right SI joint tender to palpation. Neurological: Cranial nerves II-XII grossly intact. · Gait - Slightly antalgic gait. Ambulates without assistive device. · Motor: 5/5 muscle strength in bilateral hip flexion, knee flexion, knee extension, ankle dorsiflexion, and ankle plantar flexion   · Sensory: Allodynia/hyperesthesia over right hip (iliac crest)  Skin: Healed midline surgical incision scar in low back  Psychological: Cooperative, no exaggerated pain behaviors     Assessment:    Diagnosis Orders   1. Other chronic pain  Urine Drug Screen   2. Cervical spondylosis with myelopathy     3. Spinal stenosis of lumbar region with neurogenic claudication     4. Lumbar back pain with radiculopathy affecting left lower extremity     5. Spasm of muscle     6. Failed back surgical syndrome         Nessa Sanford is a 61 y.o.male presenting to the pain clinic for evaluation of chronic low back/right buttocks pain in the setting of previous L2-S1 decompression/fusion in 2018. His clinical history and physical examination are consistent with severe right SI joint dysfunction/pain. I have set him up for a right SI joint injection under fluoroscopy.  We may potentially investigate the use of a spinal cord stimulation in the setting of maximum amount of acetaminophen taken on a regular basis should only be 4000 mg per day. Patient is taking Meloxicam. Patient is advised to take as prescribed and not take on an empty stomach. Adjuvants: In light of the presence of a neuropathic component of pain, we will continue gabapentin 800 mg 3 times a day. In the presence of musculoskeletal pain/muscle spasms, I have continue the patient on Zanaflex 4 mg every 8 hours as needed. Interventions:   None    Anticoagulation/NPO Recommendations:   None    Multidisciplinary Pain Management:   In the presence of complex, chronic, and multi-factorial pain, the importance of a multidisciplinary approach to pain management in the patients management regimen was emphasized and discussed in great detail.    PHYSICAL THERAPY: I refer patient to physical therapy for posterior chain core strengthening program to work on lumbar paraspinal muscle strengthening    Referrals:  None    Prescriptions Written This Visit:   None    Follow-up: 12 weeks    FALLON Gill - CNP

## 2022-03-01 ENCOUNTER — HOSPITAL ENCOUNTER (OUTPATIENT)
Dept: NON INVASIVE DIAGNOSTICS | Age: 67
Discharge: HOME | End: 2022-03-01
Payer: MEDICARE

## 2022-03-01 DIAGNOSIS — Z72.0 TOBACCO ABUSE: ICD-10-CM

## 2022-03-01 DIAGNOSIS — R06.02 SOB (SHORTNESS OF BREATH) ON EXERTION: ICD-10-CM

## 2022-03-01 DIAGNOSIS — Z91.14 NONCOMPLIANCE WITH MEDICATION REGIMEN: ICD-10-CM

## 2022-03-01 DIAGNOSIS — Z01.818 PRE-OP EVALUATION: ICD-10-CM

## 2022-03-01 DIAGNOSIS — E78.5 DYSLIPIDEMIA: ICD-10-CM

## 2022-03-01 DIAGNOSIS — Z82.49 FAMILY HISTORY OF EARLY CAD: ICD-10-CM

## 2022-03-01 DIAGNOSIS — R94.31 ABNORMAL EKG: ICD-10-CM

## 2022-03-01 DIAGNOSIS — Z87.898 HISTORY OF CHEST PAIN: ICD-10-CM

## 2022-03-01 DIAGNOSIS — Z98.890 S/P CARDIAC CATH: ICD-10-CM

## 2022-03-01 LAB
LV EF: 55 %
LVEF MODALITY: NORMAL

## 2022-03-01 PROCEDURE — 93306 TTE W/DOPPLER COMPLETE: CPT

## 2022-03-01 PROCEDURE — 78452 HT MUSCLE IMAGE SPECT MULT: CPT

## 2022-03-01 PROCEDURE — 6360000002 HC RX W HCPCS

## 2022-03-01 PROCEDURE — 3430000000 HC RX DIAGNOSTIC RADIOPHARMACEUTICAL: Performed by: INTERNAL MEDICINE

## 2022-03-01 PROCEDURE — 93017 CV STRESS TEST TRACING ONLY: CPT | Performed by: INTERNAL MEDICINE

## 2022-03-01 PROCEDURE — A9500 TC99M SESTAMIBI: HCPCS | Performed by: INTERNAL MEDICINE

## 2022-03-01 RX ADMIN — Medication 34.8 MILLICURIE: at 14:59

## 2022-03-01 RX ADMIN — Medication 9.9 MILLICURIE: at 14:00

## 2022-03-03 DIAGNOSIS — M62.838 OTHER MUSCLE SPASM: ICD-10-CM

## 2022-03-03 RX ORDER — TIZANIDINE 4 MG/1
4 TABLET ORAL EVERY 8 HOURS PRN
Qty: 270 TABLET | Refills: 0 | Status: SHIPPED | OUTPATIENT
Start: 2022-03-03 | End: 2022-05-13 | Stop reason: SDUPTHER

## 2022-03-07 ENCOUNTER — TELEPHONE (OUTPATIENT)
Dept: NEUROSURGERY | Age: 67
End: 2022-03-07

## 2022-03-07 NOTE — TELEPHONE ENCOUNTER
SURGERY [address]  [facility] [address] [place]UR[place] AM [place] [address].[place], [address]      Phone *[phone]   Surgical Scheduling Direct Line Phone *[phone] Fax *[phone]      Jahaira Ni   1955   male    [address] [address]  [address]              Home Phone: [phone]    Cell Phone:    Telephone Information:   Mobile [phone]          Surgeon: Aurora Andrews   Surgery Date: 3/15/22  Time: 8:00 am     Procedure: Revision previous lumbar spine decompression instrumentation and fusion with extension to the pelvis. T10 and Pelvis + possible interbody cage placement. Diagnosis: Postlaminectomy syndrome, chronic pain     Important Medical History:       Special Inst/Equip: Position: Prone, Milton table, microscope, Neuropsyology, Fluoro, Neuromontoring. Anesthesia:  Gen            Admission Type: Inpatient     Case Location:      Main OR         Need Preop Cardiac Clearance:       Does Patient have Cardiologist/physician?          Surgery Confirmation #: __________________________________________________    [address]: ________________________   Date: __________________________     [employer] Name: [facility] Medicare, Medicaid [place]  CPT: [id number]

## 2022-03-08 NOTE — PROGRESS NOTES
I spoke with Yesenia Yuen in Dr. Lam Day office- she is aware of Dr. Morris Brush request for patient to continue the aspirin and she will ask Dr. Shanel Cortez.  did see the positive Nasal  MSSA and she will call in mupirocin order for patient to start pre op. Thank you.

## 2022-03-09 NOTE — PROGRESS NOTES
Called  to ask about the aspirin. she will be calling pt today or tomorrow with what dr Ozzie Abdul says about the aspirin.

## 2022-03-09 NOTE — PROGRESS NOTES
Follow all instructions given by your physician    NPO after midnight   Sips of water am of surgery with allowed medications  Bring insurance info and 's license  Wear comfortable clean clothing  No jewelry or contact lenses to be worn day of surgery   Case for glasses. Shower night before and morning of surgery with a liquid antibacterial soap, dry with fresh clean towel; no lotions, creams or powder. Clean sheets and pillow case on bed night before surgery  Bring medications in original bottles      Report to [facility] on 2nd floor  If you would become ill prior to surgery, please call the surgeon  May have a visitor with you, we request that you limit to 2 visitors in pre-op area  Please bring and wear mask  Call [phone] for any questions  Covid questionnaire Complete; Patient negative for symptoms or exposure. See documentation.

## 2022-03-09 NOTE — PROGRESS NOTES
Preliminary Discharge Planning Questionnaire  Date of Surgery 3-15 Surgeon renae      · Having the proper help and care after surgery is very important to your recovery. Who will be able to help you at home when you are discharged from the hospital? ([facility] - 24/7 for a minimum of 2 weeks)    significant other    Name(s) belem    · How many steps to enter your home? 2    · Bathroom on first floor? Yes    · Bedroom on the first floor? Yes    · Do you have an elevated toilet seat to use at home? No    · Do you have a walker to use at home? · Total Joints - with wheels No   · Spine - with wheels  No     Have you been doing home exercises? *You will go home with some outpatient physical therapy, where do you prefer to go? [facility]    *If needed, what home health agency would you like to use?  [facility]

## 2022-03-13 NOTE — H&P
Taurus Avila is a pleasant, active 61 y.o.  male, a current every day smoker tobacco with 1/2 pack 24-year history who denies current alcohol use and has a medical history significant for anxiety and depression, bradycardia, hyperlipidemia and hypertension, headache (unspecified), GERD, prolonged emergence from general anesthesia, cervical spondylosis with myelopathy, low back pain secondary to spinal stenosis with a surgical history significant for SI joint injection therapy along with nerve block therapy and cervical fusion performed by Dr. Karen moctezuma in 2015 and prior lumbar laminectomy with TLIF from L2-S1 in 2018. On presentation patient complains of progressive severe low back pain worsening over the last 7 months and when exacerbated estimated at 9 out of 10 on a pain scale. The pain is increased with activity and standing and radiates to the legs bilaterally with weakness for right lower extremity thigh. Since CT and MRIs reveal extensive postoperative changes between L2 and S1 with moderate bilateral foraminal stenosis at L1 to along with mild to moderate bilateral foraminal stenosis at L4-5 and L5-S1 with significant degenerative changes involving the SI joints bilaterally. Past Medical History:   Diagnosis Date    Anxiety     Arthritis     Bradycardia     HR 50 on preop EKG    Cervical spondylosis with myelopathy     COPD (chronic obstructive pulmonary disease) (HCC)     breathing worse in the heat    Depression     GERD (gastroesophageal reflux disease)     Headache(784.0)     Hyperlipidemia     Hypertension     Low back pain     was on Xanax from pain clinic - no longer has script    Prolonged emergence from general anesthesia     Snoring     no apnea, BMI 27, neck circ. 15 inches, will wake coughing, no choking, no daytime somnolence    Tobacco abuse        Allergies:    Allergies   Allergen Reactions    Norco [Hydrocodone-Acetaminophen] Hives and Itching       Active Problems:    * No active hospital problems. *  Resolved Problems:    * No resolved hospital problems. *    Height 5' 10\" (1.778 m), weight 200 lb (90.7 kg). Review of Systems     Constitutional: Negative for fever. HENT: Negative for congestion. Eyes: Negative for visual disturbance. Respiratory: Negative for chest tightness. Cardiovascular: Negative for chest pain. Gastrointestinal: Negative for abdominal pain. Genitourinary: Negative for difficulty urinating. Musculoskeletal: Positive for back pain, gait problem and neck pain. Skin: Negative for wound. Neurological: Positive for weakness. Psychiatric/Behavioral: Negative for confusion. Physical Exam    Examination of carotid arteries (puls, amplitude, bruits) or Examination of peripheral vascular system  (swelling, varicosities and pulses, temperature, edema,tenderness : Unremarkable  Patient is awake, alert and oriented x3  Recent and remote memory: decline  Attention span and concentration: decline  Language (naming objects;repeating phrases;spontaneous speech): WNL  Fund of knowledge: Good  Cranial nerves:2-12: Grossly intact  Muscle strength, tone in all 4 extremities: proxilal weakness in righ leg about 3/5, otherwise  5/5 throughout. DTR in all 4 extremities: 1+  Babinski: Down response   Gait: Not able to stand alone because of the pain. Cerebellar function: WNL  Sensation: Grossly intact  Straight leg raising test: Negative (more pain in the back and hips area)  Has severe limitation in the range of motions of his lumbar spine in all directions. Has  postural abnormality( Sagittal and coronal imbalances). Ac's test: positive in right    Assessment:  Postlaminectomy syndrome. Failed back syndrome. SI joint disease with spinal deformity. Adjacent segment disease with spinal instability. Plan:   With the duration and severity of his symptoms along with findings on exam and on imaging we feel it is reasonable to offer surgical intervention in the form of a revision of the previous lumbar spinal decompression and instrumented fusion with extension to the pelvis and the T10 with possible interbody cage placement as indicated during surgery. We have discussed the procedure with the patient in detail along with expectations for recovery and the associated risks which include, but are not limited to; bleeding, infection, anesthetic complications, neurologic injury, incomplete relief of symptoms, the need for future fusion and even death. Understanding the risks associated with procedure he is amicable to moving forward and a consent was offered for his signature with surgery scheduled at [place] [address] with Dr. Radha Bowens for 3/14/2022.     Vaishali Vargas PA-C  3/13/2022

## 2022-03-14 ENCOUNTER — ANESTHESIA EVENT (OUTPATIENT)
Dept: OPERATING ROOM | Age: 67
DRG: 459 | End: 2022-03-14
Payer: MEDICARE

## 2022-03-14 ENCOUNTER — APPOINTMENT (OUTPATIENT)
Dept: GENERAL RADIOLOGY | Age: 67
DRG: 459 | End: 2022-03-14
Attending: NEUROLOGICAL SURGERY
Payer: MEDICARE

## 2022-03-14 ENCOUNTER — HOSPITAL ENCOUNTER (INPATIENT)
Age: 67
LOS: 5 days | Discharge: HOME HEALTH | DRG: 459 | End: 2022-03-19
Attending: NEUROLOGICAL SURGERY | Admitting: NEUROLOGICAL SURGERY
Payer: MEDICARE

## 2022-03-14 ENCOUNTER — ANESTHESIA (OUTPATIENT)
Dept: OPERATING ROOM | Age: 67
DRG: 459 | End: 2022-03-14
Payer: MEDICARE

## 2022-03-14 VITALS — DIASTOLIC BLOOD PRESSURE: 55 MMHG | OXYGEN SATURATION: 97 % | SYSTOLIC BLOOD PRESSURE: 105 MMHG | TEMPERATURE: 99.1 F

## 2022-03-14 DIAGNOSIS — M96.1 POSTLAMINECTOMY SYNDROME: ICD-10-CM

## 2022-03-14 DIAGNOSIS — M48.062 SPINAL STENOSIS OF LUMBAR REGION WITH NEUROGENIC CLAUDICATION: Primary | ICD-10-CM

## 2022-03-14 DIAGNOSIS — M54.16 LUMBAR BACK PAIN WITH RADICULOPATHY AFFECTING LEFT LOWER EXTREMITY: ICD-10-CM

## 2022-03-14 DIAGNOSIS — J95.821 POSTOPERATIVE RESPIRATORY FAILURE (HCC): ICD-10-CM

## 2022-03-14 DIAGNOSIS — Z98.1 STATUS POST LUMBAR SPINAL FUSION: ICD-10-CM

## 2022-03-14 DIAGNOSIS — M96.1 FAILED BACK SURGICAL SYNDROME: ICD-10-CM

## 2022-03-14 PROBLEM — J96.00 ACUTE RESPIRATORY FAILURE (HCC): Status: ACTIVE | Noted: 2022-03-14

## 2022-03-14 LAB
ABO: NORMAL
ANION GAP SERPL CALCULATED.3IONS-SCNC: 11 MEQ/L (ref 8–16)
ANTIBODY SCREEN: NORMAL
APTT: 33.1 SECONDS (ref 22–38)
BASE EXCESS (CALCULATED): -2.1 MMOL/L (ref -2.5–2.5)
BASE EXCESS (CALCULATED): -2.5 MMOL/L (ref -2.5–2.5)
BASE EXCESS (CALCULATED): -6.8 MMOL/L (ref -2.5–2.5)
BUN BLDV-MCNC: 18 MG/DL (ref 7–22)
CALCIUM IONIZED SERUM: 1.01 MMOL/L (ref 1.12–1.32)
CALCIUM IONIZED SERUM: 1.13 MMOL/L (ref 1.12–1.32)
CALCIUM IONIZED SERUM: 1.18 MMOL/L (ref 1.12–1.32)
CALCIUM IONIZED: 1.07 MMOL/L (ref 1.12–1.32)
CALCIUM SERPL-MCNC: 7.6 MG/DL (ref 8.5–10.5)
CHLORIDE BLD-SCNC: 111 MEQ/L (ref 98–111)
CO2: 18 MEQ/L (ref 23–33)
COLLECTED BY:: ABNORMAL
CREAT SERPL-MCNC: 0.8 MG/DL (ref 0.4–1.2)
EKG ATRIAL RATE: 86 BPM
EKG P AXIS: 43 DEGREES
EKG P-R INTERVAL: 134 MS
EKG Q-T INTERVAL: 382 MS
EKG QRS DURATION: 82 MS
EKG QTC CALCULATION (BAZETT): 457 MS
EKG R AXIS: -28 DEGREES
EKG T AXIS: 10 DEGREES
EKG VENTRICULAR RATE: 86 BPM
ERYTHROCYTE [DISTWIDTH] IN BLOOD BY AUTOMATED COUNT: 15 % (ref 11.5–14.5)
ERYTHROCYTE [DISTWIDTH] IN BLOOD BY AUTOMATED COUNT: 49.8 FL (ref 35–45)
GFR SERPL CREATININE-BSD FRML MDRD: > 90 ML/MIN/1.73M2
GLUCOSE BLD-MCNC: 163 MG/DL (ref 70–108)
GLUCOSE, WHOLE BLOOD: 125 MG/DL (ref 70–108)
GLUCOSE, WHOLE BLOOD: 140 MG/DL (ref 70–108)
GLUCOSE, WHOLE BLOOD: 154 MG/DL (ref 70–108)
HCO3: 18 MMOL/L (ref 23–28)
HCO3: 23 MMOL/L (ref 23–28)
HCO3: 23 MMOL/L (ref 23–28)
HCT VFR BLD CALC: 35.6 % (ref 42–52)
HCT VFR BLD CALC: 48.3 % (ref 42–52)
HEMOGLOBIN FINGERSTICK, POC: 11 G/DL (ref 14–18)
HEMOGLOBIN FINGERSTICK, POC: 11 G/DL (ref 14–18)
HEMOGLOBIN FINGERSTICK, POC: 11.5 G/DL (ref 14–18)
HEMOGLOBIN: 11.4 GM/DL (ref 14–18)
HEMOGLOBIN: 15.8 GM/DL (ref 14–18)
MCH RBC QN AUTO: 29.4 PG (ref 26–33)
MCHC RBC AUTO-ENTMCNC: 32.7 GM/DL (ref 32.2–35.5)
MCV RBC AUTO: 89.9 FL (ref 80–94)
O2 SATURATION: 100 %
O2 SATURATION: 98 %
O2 SATURATION: 98 %
PCO2: 35 MMHG (ref 35–45)
PCO2: 38 MMHG (ref 35–45)
PCO2: 41 MMHG (ref 35–45)
PH BLOOD GAS: 7.33 (ref 7.35–7.45)
PH BLOOD GAS: 7.36 (ref 7.35–7.45)
PH BLOOD GAS: 7.38 (ref 7.35–7.45)
PLATELET # BLD: 250 THOU/MM3 (ref 130–400)
PMV BLD AUTO: 10.3 FL (ref 9.4–12.4)
PO2: 112 MMHG (ref 71–104)
PO2: 118 MMHG (ref 71–104)
PO2: 190 MMHG (ref 71–104)
POTASSIUM SERPL-SCNC: 4.2 MEQ/L (ref 3.5–5.2)
POTASSIUM, WHOLE BLOOD: 4.1 MEQ/L (ref 3.5–4.9)
POTASSIUM, WHOLE BLOOD: 4.1 MEQ/L (ref 3.5–4.9)
POTASSIUM, WHOLE BLOOD: 4.4 MEQ/L (ref 3.5–4.9)
RBC # BLD: 5.37 MILL/MM3 (ref 4.7–6.1)
RH FACTOR: NORMAL
SODIUM BLD-SCNC: 140 MEQ/L (ref 135–145)
SODIUM, WHOLE BLOOD: 143 MEQ/L (ref 138–146)
SODIUM, WHOLE BLOOD: 145 MEQ/L (ref 138–146)
SODIUM, WHOLE BLOOD: 145 MEQ/L (ref 138–146)
WBC # BLD: 9.7 THOU/MM3 (ref 4.8–10.8)

## 2022-03-14 PROCEDURE — 3209999900 FLUORO FOR SURGICAL PROCEDURES

## 2022-03-14 PROCEDURE — 94640 AIRWAY INHALATION TREATMENT: CPT

## 2022-03-14 PROCEDURE — 71045 X-RAY EXAM CHEST 1 VIEW: CPT

## 2022-03-14 PROCEDURE — 6360000002 HC RX W HCPCS: Performed by: NURSE PRACTITIONER

## 2022-03-14 PROCEDURE — 0RGA0K1 FUSION OF THORACOLUMBAR VERTEBRAL JOINT WITH NONAUTOLOGOUS TISSUE SUBSTITUTE, POSTERIOR APPROACH, POSTERIOR COLUMN, OPEN APPROACH: ICD-10-PCS | Performed by: NEUROLOGICAL SURGERY

## 2022-03-14 PROCEDURE — 0SG704Z FUSION OF RIGHT SACROILIAC JOINT WITH INTERNAL FIXATION DEVICE, OPEN APPROACH: ICD-10-PCS | Performed by: NEUROLOGICAL SURGERY

## 2022-03-14 PROCEDURE — 2580000003 HC RX 258: Performed by: PHYSICIAN ASSISTANT

## 2022-03-14 PROCEDURE — 6360000002 HC RX W HCPCS: Performed by: NURSE ANESTHETIST, CERTIFIED REGISTERED

## 2022-03-14 PROCEDURE — 87086 URINE CULTURE/COLONY COUNT: CPT

## 2022-03-14 PROCEDURE — 22842 INSERT SPINE FIXATION DEVICE: CPT | Performed by: NEUROLOGICAL SURGERY

## 2022-03-14 PROCEDURE — 3700000000 HC ANESTHESIA ATTENDED CARE: Performed by: NEUROLOGICAL SURGERY

## 2022-03-14 PROCEDURE — 2500000003 HC RX 250 WO HCPCS: Performed by: NURSE ANESTHETIST, CERTIFIED REGISTERED

## 2022-03-14 PROCEDURE — 22610 ARTHRD PST TQ 1NTRSPC THRC: CPT | Performed by: NEUROLOGICAL SURGERY

## 2022-03-14 PROCEDURE — 2000000000 HC ICU R&B

## 2022-03-14 PROCEDURE — 86901 BLOOD TYPING SEROLOGIC RH(D): CPT

## 2022-03-14 PROCEDURE — 82330 ASSAY OF CALCIUM: CPT

## 2022-03-14 PROCEDURE — 86923 COMPATIBILITY TEST ELECTRIC: CPT

## 2022-03-14 PROCEDURE — 6360000002 HC RX W HCPCS

## 2022-03-14 PROCEDURE — 85014 HEMATOCRIT: CPT

## 2022-03-14 PROCEDURE — 0QP004Z REMOVAL OF INTERNAL FIXATION DEVICE FROM LUMBAR VERTEBRA, OPEN APPROACH: ICD-10-PCS | Performed by: NEUROLOGICAL SURGERY

## 2022-03-14 PROCEDURE — 86900 BLOOD TYPING SEROLOGIC ABO: CPT

## 2022-03-14 PROCEDURE — 63042 LAMINOTOMY SINGLE LUMBAR: CPT | Performed by: PHYSICIAN ASSISTANT

## 2022-03-14 PROCEDURE — 63042 LAMINOTOMY SINGLE LUMBAR: CPT | Performed by: NEUROLOGICAL SURGERY

## 2022-03-14 PROCEDURE — 85027 COMPLETE CBC AUTOMATED: CPT

## 2022-03-14 PROCEDURE — 0BH17EZ INSERTION OF ENDOTRACHEAL AIRWAY INTO TRACHEA, VIA NATURAL OR ARTIFICIAL OPENING: ICD-10-PCS | Performed by: ANESTHESIOLOGY

## 2022-03-14 PROCEDURE — 93010 ELECTROCARDIOGRAM REPORT: CPT | Performed by: INTERNAL MEDICINE

## 2022-03-14 PROCEDURE — 2500000003 HC RX 250 WO HCPCS: Performed by: NURSE PRACTITIONER

## 2022-03-14 PROCEDURE — 84132 ASSAY OF SERUM POTASSIUM: CPT

## 2022-03-14 PROCEDURE — 63016 REMOVE SPINE LAMINA >2 THRC: CPT | Performed by: PHYSICIAN ASSISTANT

## 2022-03-14 PROCEDURE — 22610 ARTHRD PST TQ 1NTRSPC THRC: CPT | Performed by: PHYSICIAN ASSISTANT

## 2022-03-14 PROCEDURE — 6370000000 HC RX 637 (ALT 250 FOR IP): Performed by: PHYSICIAN ASSISTANT

## 2022-03-14 PROCEDURE — 6360000002 HC RX W HCPCS: Performed by: NEUROLOGICAL SURGERY

## 2022-03-14 PROCEDURE — 0SG00K1 FUSION OF LUMBAR VERTEBRAL JOINT WITH NONAUTOLOGOUS TISSUE SUBSTITUTE, POSTERIOR APPROACH, POSTERIOR COLUMN, OPEN APPROACH: ICD-10-PCS | Performed by: NEUROLOGICAL SURGERY

## 2022-03-14 PROCEDURE — 22614 ARTHRD PST TQ 1NTRSPC EA ADD: CPT | Performed by: PHYSICIAN ASSISTANT

## 2022-03-14 PROCEDURE — 6370000000 HC RX 637 (ALT 250 FOR IP): Performed by: NEUROLOGICAL SURGERY

## 2022-03-14 PROCEDURE — 80048 BASIC METABOLIC PNL TOTAL CA: CPT

## 2022-03-14 PROCEDURE — 22614 ARTHRD PST TQ 1NTRSPC EA ADD: CPT | Performed by: NEUROLOGICAL SURGERY

## 2022-03-14 PROCEDURE — 8E0WXBZ COMPUTER ASSISTED PROCEDURE OF TRUNK REGION: ICD-10-PCS | Performed by: NEUROLOGICAL SURGERY

## 2022-03-14 PROCEDURE — P9045 ALBUMIN (HUMAN), 5%, 250 ML: HCPCS | Performed by: NURSE ANESTHETIST, CERTIFIED REGISTERED

## 2022-03-14 PROCEDURE — 63016 REMOVE SPINE LAMINA >2 THRC: CPT | Performed by: NEUROLOGICAL SURGERY

## 2022-03-14 PROCEDURE — 86850 RBC ANTIBODY SCREEN: CPT

## 2022-03-14 PROCEDURE — 0SG804Z FUSION OF LEFT SACROILIAC JOINT WITH INTERNAL FIXATION DEVICE, OPEN APPROACH: ICD-10-PCS | Performed by: NEUROLOGICAL SURGERY

## 2022-03-14 PROCEDURE — 3700000001 HC ADD 15 MINUTES (ANESTHESIA): Performed by: NEUROLOGICAL SURGERY

## 2022-03-14 PROCEDURE — C1713 ANCHOR/SCREW BN/BN,TIS/BN: HCPCS | Performed by: NEUROLOGICAL SURGERY

## 2022-03-14 PROCEDURE — 82947 ASSAY GLUCOSE BLOOD QUANT: CPT

## 2022-03-14 PROCEDURE — 85018 HEMOGLOBIN: CPT

## 2022-03-14 PROCEDURE — 0QP104Z REMOVAL OF INTERNAL FIXATION DEVICE FROM SACRUM, OPEN APPROACH: ICD-10-PCS | Performed by: NEUROLOGICAL SURGERY

## 2022-03-14 PROCEDURE — 2580000003 HC RX 258: Performed by: NURSE PRACTITIONER

## 2022-03-14 PROCEDURE — 3600000015 HC SURGERY LEVEL 5 ADDTL 15MIN: Performed by: NEUROLOGICAL SURGERY

## 2022-03-14 PROCEDURE — 01NB0ZZ RELEASE LUMBAR NERVE, OPEN APPROACH: ICD-10-PCS | Performed by: NEUROLOGICAL SURGERY

## 2022-03-14 PROCEDURE — 2580000003 HC RX 258: Performed by: NURSE ANESTHETIST, CERTIFIED REGISTERED

## 2022-03-14 PROCEDURE — 22842 INSERT SPINE FIXATION DEVICE: CPT | Performed by: PHYSICIAN ASSISTANT

## 2022-03-14 PROCEDURE — 3600000005 HC SURGERY LEVEL 5 BASE: Performed by: NEUROLOGICAL SURGERY

## 2022-03-14 PROCEDURE — 82803 BLOOD GASES ANY COMBINATION: CPT

## 2022-03-14 PROCEDURE — 0RG60K1 FUSION OF THORACIC VERTEBRAL JOINT WITH NONAUTOLOGOUS TISSUE SUBSTITUTE, POSTERIOR APPROACH, POSTERIOR COLUMN, OPEN APPROACH: ICD-10-PCS | Performed by: NEUROLOGICAL SURGERY

## 2022-03-14 PROCEDURE — 94002 VENT MGMT INPAT INIT DAY: CPT

## 2022-03-14 PROCEDURE — 94760 N-INVAS EAR/PLS OXIMETRY 1: CPT

## 2022-03-14 PROCEDURE — 85730 THROMBOPLASTIN TIME PARTIAL: CPT

## 2022-03-14 PROCEDURE — APPNB180 APP NON BILLABLE TIME > 60 MINS: Performed by: NURSE PRACTITIONER

## 2022-03-14 PROCEDURE — 6370000000 HC RX 637 (ALT 250 FOR IP): Performed by: NURSE PRACTITIONER

## 2022-03-14 PROCEDURE — 2709999900 HC NON-CHARGEABLE SUPPLY: Performed by: NEUROLOGICAL SURGERY

## 2022-03-14 PROCEDURE — 93005 ELECTROCARDIOGRAM TRACING: CPT | Performed by: NURSE PRACTITIONER

## 2022-03-14 PROCEDURE — 36415 COLL VENOUS BLD VENIPUNCTURE: CPT

## 2022-03-14 PROCEDURE — 01N80ZZ RELEASE THORACIC NERVE, OPEN APPROACH: ICD-10-PCS | Performed by: NEUROLOGICAL SURGERY

## 2022-03-14 PROCEDURE — 5A1935Z RESPIRATORY VENTILATION, LESS THAN 24 CONSECUTIVE HOURS: ICD-10-PCS | Performed by: ANESTHESIOLOGY

## 2022-03-14 PROCEDURE — 72100 X-RAY EXAM L-S SPINE 2/3 VWS: CPT

## 2022-03-14 PROCEDURE — 84295 ASSAY OF SERUM SODIUM: CPT

## 2022-03-14 PROCEDURE — 2720000010 HC SURG SUPPLY STERILE: Performed by: NEUROLOGICAL SURGERY

## 2022-03-14 PROCEDURE — 61783 SCAN PROC SPINAL: CPT | Performed by: NEUROLOGICAL SURGERY

## 2022-03-14 PROCEDURE — 6360000002 HC RX W HCPCS: Performed by: PHYSICIAN ASSISTANT

## 2022-03-14 PROCEDURE — 2500000003 HC RX 250 WO HCPCS

## 2022-03-14 PROCEDURE — 0SB20ZZ EXCISION OF LUMBAR VERTEBRAL DISC, OPEN APPROACH: ICD-10-PCS | Performed by: NEUROLOGICAL SURGERY

## 2022-03-14 DEVICE — CONNECTOR 5564320 5/6-6.0 TOP LAT 20
Type: IMPLANTABLE DEVICE | Site: SPINE LUMBAR | Status: FUNCTIONAL
Brand: CD HORIZON® SOLERA® SPINAL SYSTEM

## 2022-03-14 RX ORDER — ROCURONIUM BROMIDE 10 MG/ML
INJECTION, SOLUTION INTRAVENOUS PRN
Status: DISCONTINUED | OUTPATIENT
Start: 2022-03-14 | End: 2022-03-14 | Stop reason: SDUPTHER

## 2022-03-14 RX ORDER — SODIUM CHLORIDE 0.9 % (FLUSH) 0.9 %
5-40 SYRINGE (ML) INJECTION EVERY 12 HOURS SCHEDULED
Status: DISCONTINUED | OUTPATIENT
Start: 2022-03-14 | End: 2022-03-14 | Stop reason: HOSPADM

## 2022-03-14 RX ORDER — ACETAMINOPHEN 325 MG/1
650 TABLET ORAL EVERY 6 HOURS PRN
Status: DISCONTINUED | OUTPATIENT
Start: 2022-03-14 | End: 2022-03-19 | Stop reason: HOSPADM

## 2022-03-14 RX ORDER — LISINOPRIL 5 MG/1
5 TABLET ORAL DAILY
Status: DISCONTINUED | OUTPATIENT
Start: 2022-03-14 | End: 2022-03-14

## 2022-03-14 RX ORDER — LISINOPRIL 5 MG/1
5 TABLET ORAL DAILY
Status: DISCONTINUED | OUTPATIENT
Start: 2022-03-15 | End: 2022-03-19 | Stop reason: HOSPADM

## 2022-03-14 RX ORDER — MIDAZOLAM HYDROCHLORIDE 1 MG/ML
INJECTION INTRAMUSCULAR; INTRAVENOUS PRN
Status: DISCONTINUED | OUTPATIENT
Start: 2022-03-14 | End: 2022-03-14 | Stop reason: SDUPTHER

## 2022-03-14 RX ORDER — SODIUM CHLORIDE 0.9 % (FLUSH) 0.9 %
5-40 SYRINGE (ML) INJECTION PRN
Status: DISCONTINUED | OUTPATIENT
Start: 2022-03-14 | End: 2022-03-14 | Stop reason: HOSPADM

## 2022-03-14 RX ORDER — SODIUM CHLORIDE 9 MG/ML
INJECTION, SOLUTION INTRAVENOUS CONTINUOUS
Status: DISCONTINUED | OUTPATIENT
Start: 2022-03-14 | End: 2022-03-16

## 2022-03-14 RX ORDER — SODIUM CHLORIDE 9 MG/ML
25 INJECTION, SOLUTION INTRAVENOUS PRN
Status: DISCONTINUED | OUTPATIENT
Start: 2022-03-14 | End: 2022-03-14 | Stop reason: SDUPTHER

## 2022-03-14 RX ORDER — POLYETHYLENE GLYCOL 3350 17 G/17G
17 POWDER, FOR SOLUTION ORAL DAILY PRN
Status: DISCONTINUED | OUTPATIENT
Start: 2022-03-14 | End: 2022-03-16

## 2022-03-14 RX ORDER — DEXAMETHASONE SODIUM PHOSPHATE 10 MG/ML
INJECTION, EMULSION INTRAMUSCULAR; INTRAVENOUS PRN
Status: DISCONTINUED | OUTPATIENT
Start: 2022-03-14 | End: 2022-03-14 | Stop reason: SDUPTHER

## 2022-03-14 RX ORDER — SODIUM CHLORIDE 9 MG/ML
INJECTION, SOLUTION INTRAVENOUS CONTINUOUS PRN
Status: DISCONTINUED | OUTPATIENT
Start: 2022-03-14 | End: 2022-03-14 | Stop reason: SDUPTHER

## 2022-03-14 RX ORDER — GABAPENTIN 800 MG/1
800 TABLET ORAL DAILY
Status: DISCONTINUED | OUTPATIENT
Start: 2022-03-14 | End: 2022-03-14

## 2022-03-14 RX ORDER — PROPOFOL 10 MG/ML
INJECTION, EMULSION INTRAVENOUS PRN
Status: DISCONTINUED | OUTPATIENT
Start: 2022-03-14 | End: 2022-03-14 | Stop reason: SDUPTHER

## 2022-03-14 RX ORDER — ONDANSETRON 4 MG/1
4 TABLET, ORALLY DISINTEGRATING ORAL EVERY 8 HOURS PRN
Status: DISCONTINUED | OUTPATIENT
Start: 2022-03-14 | End: 2022-03-19 | Stop reason: HOSPADM

## 2022-03-14 RX ORDER — SODIUM CHLORIDE 9 MG/ML
INJECTION, SOLUTION INTRAVENOUS CONTINUOUS
Status: DISCONTINUED | OUTPATIENT
Start: 2022-03-14 | End: 2022-03-14

## 2022-03-14 RX ORDER — SODIUM CHLORIDE 0.9 % (FLUSH) 0.9 %
5-40 SYRINGE (ML) INJECTION PRN
Status: DISCONTINUED | OUTPATIENT
Start: 2022-03-14 | End: 2022-03-19 | Stop reason: HOSPADM

## 2022-03-14 RX ORDER — ALBUMIN, HUMAN INJ 5% 5 %
SOLUTION INTRAVENOUS PRN
Status: DISCONTINUED | OUTPATIENT
Start: 2022-03-14 | End: 2022-03-14 | Stop reason: SDUPTHER

## 2022-03-14 RX ORDER — EPHEDRINE SULFATE/0.9% NACL/PF 50 MG/5 ML
SYRINGE (ML) INTRAVENOUS PRN
Status: DISCONTINUED | OUTPATIENT
Start: 2022-03-14 | End: 2022-03-14 | Stop reason: SDUPTHER

## 2022-03-14 RX ORDER — LABETALOL 20 MG/4 ML (5 MG/ML) INTRAVENOUS SYRINGE
10
Status: DISCONTINUED | OUTPATIENT
Start: 2022-03-14 | End: 2022-03-14 | Stop reason: HOSPADM

## 2022-03-14 RX ORDER — VANCOMYCIN HYDROCHLORIDE 1 G/20ML
INJECTION, POWDER, LYOPHILIZED, FOR SOLUTION INTRAVENOUS PRN
Status: DISCONTINUED | OUTPATIENT
Start: 2022-03-14 | End: 2022-03-14 | Stop reason: HOSPADM

## 2022-03-14 RX ORDER — GABAPENTIN 400 MG/1
800 CAPSULE ORAL 3 TIMES DAILY
Status: DISCONTINUED | OUTPATIENT
Start: 2022-03-15 | End: 2022-03-19 | Stop reason: HOSPADM

## 2022-03-14 RX ORDER — TIZANIDINE 4 MG/1
4 TABLET ORAL EVERY 8 HOURS PRN
Status: DISCONTINUED | OUTPATIENT
Start: 2022-03-14 | End: 2022-03-19 | Stop reason: HOSPADM

## 2022-03-14 RX ORDER — NOREPINEPHRINE BIT/0.9 % NACL 16MG/250ML
1-100 INFUSION BOTTLE (ML) INTRAVENOUS CONTINUOUS
Status: DISCONTINUED | OUTPATIENT
Start: 2022-03-14 | End: 2022-03-16

## 2022-03-14 RX ORDER — PHENYLEPHRINE HYDROCHLORIDE 10 MG/ML
INJECTION INTRAVENOUS PRN
Status: DISCONTINUED | OUTPATIENT
Start: 2022-03-14 | End: 2022-03-14 | Stop reason: SDUPTHER

## 2022-03-14 RX ORDER — DEXMEDETOMIDINE HYDROCHLORIDE 4 UG/ML
.1-1.5 INJECTION, SOLUTION INTRAVENOUS CONTINUOUS
Status: DISCONTINUED | OUTPATIENT
Start: 2022-03-14 | End: 2022-03-16

## 2022-03-14 RX ORDER — ALBUTEROL SULFATE 2.5 MG/3ML
2.5 SOLUTION RESPIRATORY (INHALATION) ONCE
Status: COMPLETED | OUTPATIENT
Start: 2022-03-14 | End: 2022-03-14

## 2022-03-14 RX ORDER — DIPHENHYDRAMINE HYDROCHLORIDE 50 MG/ML
25 INJECTION INTRAMUSCULAR; INTRAVENOUS EVERY 6 HOURS PRN
Status: DISCONTINUED | OUTPATIENT
Start: 2022-03-14 | End: 2022-03-19 | Stop reason: HOSPADM

## 2022-03-14 RX ORDER — FENTANYL CITRATE 50 UG/ML
25 INJECTION, SOLUTION INTRAMUSCULAR; INTRAVENOUS
Status: DISCONTINUED | OUTPATIENT
Start: 2022-03-14 | End: 2022-03-14

## 2022-03-14 RX ORDER — SODIUM CHLORIDE 0.9 % (FLUSH) 0.9 %
5-40 SYRINGE (ML) INJECTION EVERY 12 HOURS SCHEDULED
Status: DISCONTINUED | OUTPATIENT
Start: 2022-03-14 | End: 2022-03-19 | Stop reason: HOSPADM

## 2022-03-14 RX ORDER — FENTANYL CITRATE 50 UG/ML
50 INJECTION, SOLUTION INTRAMUSCULAR; INTRAVENOUS EVERY 5 MIN PRN
Status: DISCONTINUED | OUTPATIENT
Start: 2022-03-14 | End: 2022-03-14 | Stop reason: HOSPADM

## 2022-03-14 RX ORDER — ACETAMINOPHEN 650 MG/1
650 SUPPOSITORY RECTAL EVERY 6 HOURS PRN
Status: DISCONTINUED | OUTPATIENT
Start: 2022-03-14 | End: 2022-03-19 | Stop reason: HOSPADM

## 2022-03-14 RX ORDER — FENTANYL CITRATE 50 UG/ML
INJECTION, SOLUTION INTRAMUSCULAR; INTRAVENOUS PRN
Status: DISCONTINUED | OUTPATIENT
Start: 2022-03-14 | End: 2022-03-14 | Stop reason: SDUPTHER

## 2022-03-14 RX ORDER — SODIUM CHLORIDE 9 MG/ML
25 INJECTION, SOLUTION INTRAVENOUS PRN
Status: DISCONTINUED | OUTPATIENT
Start: 2022-03-14 | End: 2022-03-14 | Stop reason: HOSPADM

## 2022-03-14 RX ORDER — OXYCODONE HYDROCHLORIDE AND ACETAMINOPHEN 5; 325 MG/1; MG/1
1 TABLET ORAL EVERY 4 HOURS PRN
Status: DISCONTINUED | OUTPATIENT
Start: 2022-03-14 | End: 2022-03-17

## 2022-03-14 RX ORDER — CHLORHEXIDINE GLUCONATE 0.12 MG/ML
15 RINSE ORAL 2 TIMES DAILY
Status: DISCONTINUED | OUTPATIENT
Start: 2022-03-14 | End: 2022-03-19 | Stop reason: HOSPADM

## 2022-03-14 RX ORDER — SUCCINYLCHOLINE/SOD CL,ISO/PF 200MG/10ML
SYRINGE (ML) INTRAVENOUS PRN
Status: DISCONTINUED | OUTPATIENT
Start: 2022-03-14 | End: 2022-03-14 | Stop reason: SDUPTHER

## 2022-03-14 RX ORDER — SODIUM CHLORIDE 9 MG/ML
25 INJECTION, SOLUTION INTRAVENOUS PRN
Status: DISCONTINUED | OUTPATIENT
Start: 2022-03-14 | End: 2022-03-19 | Stop reason: HOSPADM

## 2022-03-14 RX ORDER — HYDROMORPHONE HCL 110MG/55ML
PATIENT CONTROLLED ANALGESIA SYRINGE INTRAVENOUS PRN
Status: DISCONTINUED | OUTPATIENT
Start: 2022-03-14 | End: 2022-03-14 | Stop reason: SDUPTHER

## 2022-03-14 RX ORDER — ONDANSETRON 2 MG/ML
4 INJECTION INTRAMUSCULAR; INTRAVENOUS EVERY 6 HOURS PRN
Status: DISCONTINUED | OUTPATIENT
Start: 2022-03-14 | End: 2022-03-19 | Stop reason: HOSPADM

## 2022-03-14 RX ORDER — PROPOFOL 10 MG/ML
5-50 INJECTION, EMULSION INTRAVENOUS
Status: DISCONTINUED | OUTPATIENT
Start: 2022-03-14 | End: 2022-03-15

## 2022-03-14 RX ORDER — SODIUM CHLORIDE 0.9 % (FLUSH) 0.9 %
5-40 SYRINGE (ML) INJECTION EVERY 12 HOURS SCHEDULED
Status: DISCONTINUED | OUTPATIENT
Start: 2022-03-14 | End: 2022-03-14

## 2022-03-14 RX ORDER — CALCIUM CHLORIDE 100 MG/ML
INJECTION INTRAVENOUS; INTRAVENTRICULAR PRN
Status: DISCONTINUED | OUTPATIENT
Start: 2022-03-14 | End: 2022-03-14 | Stop reason: SDUPTHER

## 2022-03-14 RX ORDER — KETOROLAC TROMETHAMINE 30 MG/ML
15 INJECTION, SOLUTION INTRAMUSCULAR; INTRAVENOUS EVERY 6 HOURS PRN
Status: DISCONTINUED | OUTPATIENT
Start: 2022-03-14 | End: 2022-03-19 | Stop reason: HOSPADM

## 2022-03-14 RX ORDER — VASOPRESSIN 20 U/ML
INJECTION PARENTERAL PRN
Status: DISCONTINUED | OUTPATIENT
Start: 2022-03-14 | End: 2022-03-14 | Stop reason: SDUPTHER

## 2022-03-14 RX ORDER — ONDANSETRON 2 MG/ML
INJECTION INTRAMUSCULAR; INTRAVENOUS PRN
Status: DISCONTINUED | OUTPATIENT
Start: 2022-03-14 | End: 2022-03-14 | Stop reason: SDUPTHER

## 2022-03-14 RX ORDER — FENTANYL CITRATE 50 UG/ML
50 INJECTION, SOLUTION INTRAMUSCULAR; INTRAVENOUS
Status: DISCONTINUED | OUTPATIENT
Start: 2022-03-14 | End: 2022-03-14

## 2022-03-14 RX ORDER — GINSENG 100 MG
CAPSULE ORAL PRN
Status: DISCONTINUED | OUTPATIENT
Start: 2022-03-14 | End: 2022-03-14 | Stop reason: HOSPADM

## 2022-03-14 RX ORDER — SODIUM CHLORIDE 9 MG/ML
INJECTION, SOLUTION INTRAVENOUS PRN
Status: DISCONTINUED | OUTPATIENT
Start: 2022-03-14 | End: 2022-03-19 | Stop reason: HOSPADM

## 2022-03-14 RX ORDER — SODIUM CHLORIDE 0.9 % (FLUSH) 0.9 %
5-40 SYRINGE (ML) INJECTION PRN
Status: DISCONTINUED | OUTPATIENT
Start: 2022-03-14 | End: 2022-03-14 | Stop reason: SDUPTHER

## 2022-03-14 RX ORDER — LIDOCAINE HYDROCHLORIDE 20 MG/ML
INJECTION, SOLUTION INTRAVENOUS PRN
Status: DISCONTINUED | OUTPATIENT
Start: 2022-03-14 | End: 2022-03-14 | Stop reason: SDUPTHER

## 2022-03-14 RX ORDER — PROPOFOL 10 MG/ML
INJECTION, EMULSION INTRAVENOUS
Status: COMPLETED
Start: 2022-03-14 | End: 2022-03-14

## 2022-03-14 RX ADMIN — VASOPRESSIN 4 UNITS: 20 INJECTION INTRAVENOUS at 12:30

## 2022-03-14 RX ADMIN — VASOPRESSIN 2 UNITS: 20 INJECTION INTRAVENOUS at 11:14

## 2022-03-14 RX ADMIN — Medication 10 MG: at 08:59

## 2022-03-14 RX ADMIN — CALCIUM CHLORIDE 1 G: 100 INJECTION, SOLUTION INTRAVENOUS at 16:15

## 2022-03-14 RX ADMIN — MIDAZOLAM 2 MG: 1 INJECTION INTRAMUSCULAR; INTRAVENOUS at 08:29

## 2022-03-14 RX ADMIN — LIDOCAINE HYDROCHLORIDE 100 MG: 20 INJECTION, SOLUTION INTRAVENOUS at 08:33

## 2022-03-14 RX ADMIN — ROCURONIUM BROMIDE 20 MG: 10 INJECTION INTRAVENOUS at 10:02

## 2022-03-14 RX ADMIN — PROPOFOL 40 MG: 10 INJECTION, EMULSION INTRAVENOUS at 19:16

## 2022-03-14 RX ADMIN — HYDROMORPHONE HYDROCHLORIDE 0.5 MG: 2 INJECTION INTRAMUSCULAR; INTRAVENOUS; SUBCUTANEOUS at 12:44

## 2022-03-14 RX ADMIN — ALBUMIN (HUMAN) 250 ML: 12.5 SOLUTION INTRAVENOUS at 17:22

## 2022-03-14 RX ADMIN — VASOPRESSIN 2 UNITS: 20 INJECTION INTRAVENOUS at 12:05

## 2022-03-14 RX ADMIN — ALBUTEROL SULFATE 2.5 MG: 2.5 SOLUTION RESPIRATORY (INHALATION) at 07:53

## 2022-03-14 RX ADMIN — ALBUMIN (HUMAN) 250 ML: 12.5 SOLUTION INTRAVENOUS at 16:17

## 2022-03-14 RX ADMIN — SUGAMMADEX 50 MG: 100 INJECTION, SOLUTION INTRAVENOUS at 11:49

## 2022-03-14 RX ADMIN — PHENYLEPHRINE HYDROCHLORIDE 80 MCG: 10 INJECTION INTRAVENOUS at 08:38

## 2022-03-14 RX ADMIN — SODIUM CHLORIDE: 9 INJECTION, SOLUTION INTRAVENOUS at 09:01

## 2022-03-14 RX ADMIN — PROPOFOL 40 MCG/KG/MIN: 10 INJECTION, EMULSION INTRAVENOUS at 22:31

## 2022-03-14 RX ADMIN — PHENYLEPHRINE HYDROCHLORIDE 80 MCG: 10 INJECTION INTRAVENOUS at 16:07

## 2022-03-14 RX ADMIN — FENTANYL CITRATE 50 MCG: 50 INJECTION, SOLUTION INTRAMUSCULAR; INTRAVENOUS at 15:24

## 2022-03-14 RX ADMIN — PHENYLEPHRINE HYDROCHLORIDE 80 MCG: 10 INJECTION INTRAVENOUS at 10:59

## 2022-03-14 RX ADMIN — SODIUM CHLORIDE: 9 INJECTION, SOLUTION INTRAVENOUS at 08:28

## 2022-03-14 RX ADMIN — ONDANSETRON HYDROCHLORIDE 4 MG: 4 INJECTION, SOLUTION INTRAMUSCULAR; INTRAVENOUS at 18:22

## 2022-03-14 RX ADMIN — Medication 10 MG: at 08:51

## 2022-03-14 RX ADMIN — ROCURONIUM BROMIDE 20 MG: 10 INJECTION INTRAVENOUS at 10:45

## 2022-03-14 RX ADMIN — VASOPRESSIN 2 UNITS: 20 INJECTION INTRAVENOUS at 10:21

## 2022-03-14 RX ADMIN — VASOPRESSIN 2 UNITS: 20 INJECTION INTRAVENOUS at 12:51

## 2022-03-14 RX ADMIN — 0.12% CHLORHEXIDINE GLUCONATE 15 ML: 1.2 RINSE ORAL at 21:22

## 2022-03-14 RX ADMIN — VASOPRESSIN 2 UNITS: 20 INJECTION INTRAVENOUS at 16:16

## 2022-03-14 RX ADMIN — CEFAZOLIN 2000 MG: 10 INJECTION, POWDER, FOR SOLUTION INTRAVENOUS at 09:24

## 2022-03-14 RX ADMIN — VASOPRESSIN 2 UNITS: 20 INJECTION INTRAVENOUS at 12:18

## 2022-03-14 RX ADMIN — VASOPRESSIN 4 UNITS: 20 INJECTION INTRAVENOUS at 09:01

## 2022-03-14 RX ADMIN — PHENYLEPHRINE HYDROCHLORIDE 80 MCG: 10 INJECTION INTRAVENOUS at 14:42

## 2022-03-14 RX ADMIN — SODIUM CHLORIDE: 9 INJECTION, SOLUTION INTRAVENOUS at 17:42

## 2022-03-14 RX ADMIN — ALBUMIN (HUMAN) 250 ML: 12.5 SOLUTION INTRAVENOUS at 12:26

## 2022-03-14 RX ADMIN — SODIUM BICARBONATE 100 MEQ: 84 INJECTION, SOLUTION INTRAVENOUS at 16:15

## 2022-03-14 RX ADMIN — VASOPRESSIN 2 UNITS: 20 INJECTION INTRAVENOUS at 14:56

## 2022-03-14 RX ADMIN — OXYCODONE AND ACETAMINOPHEN 1 TABLET: 5; 325 TABLET ORAL at 22:21

## 2022-03-14 RX ADMIN — VASOPRESSIN 4 UNITS: 20 INJECTION INTRAVENOUS at 13:38

## 2022-03-14 RX ADMIN — DEXAMETHASONE SODIUM PHOSPHATE 8 MG: 10 INJECTION, EMULSION INTRAMUSCULAR; INTRAVENOUS at 13:20

## 2022-03-14 RX ADMIN — PHENYLEPHRINE HYDROCHLORIDE 100 MCG: 10 INJECTION INTRAVENOUS at 12:02

## 2022-03-14 RX ADMIN — Medication 10 MG: at 08:49

## 2022-03-14 RX ADMIN — SODIUM CHLORIDE: 9 INJECTION, SOLUTION INTRAVENOUS at 08:05

## 2022-03-14 RX ADMIN — SODIUM CHLORIDE: 9 INJECTION, SOLUTION INTRAVENOUS at 14:56

## 2022-03-14 RX ADMIN — HYDROMORPHONE HYDROCHLORIDE 1 MG: 2 INJECTION INTRAMUSCULAR; INTRAVENOUS; SUBCUTANEOUS at 08:45

## 2022-03-14 RX ADMIN — PROPOFOL 40 MG: 10 INJECTION, EMULSION INTRAVENOUS at 19:30

## 2022-03-14 RX ADMIN — Medication 2 MCG/MIN: at 22:57

## 2022-03-14 RX ADMIN — MIDAZOLAM 2 MG: 1 INJECTION INTRAMUSCULAR; INTRAVENOUS at 19:03

## 2022-03-14 RX ADMIN — PHENYLEPHRINE HYDROCHLORIDE 80 MCG: 10 INJECTION INTRAVENOUS at 08:41

## 2022-03-14 RX ADMIN — PROPOFOL 30 MCG/KG/MIN: 10 INJECTION, EMULSION INTRAVENOUS at 19:55

## 2022-03-14 RX ADMIN — SODIUM CHLORIDE, PRESERVATIVE FREE 10 ML: 5 INJECTION INTRAVENOUS at 21:22

## 2022-03-14 RX ADMIN — ROCURONIUM BROMIDE 45 MG: 10 INJECTION INTRAVENOUS at 09:00

## 2022-03-14 RX ADMIN — VASOPRESSIN 2 UNITS: 20 INJECTION INTRAVENOUS at 09:52

## 2022-03-14 RX ADMIN — KETOROLAC TROMETHAMINE 15 MG: 30 INJECTION, SOLUTION INTRAMUSCULAR; INTRAVENOUS at 23:54

## 2022-03-14 RX ADMIN — VASOPRESSIN 2 UNITS: 20 INJECTION INTRAVENOUS at 11:54

## 2022-03-14 RX ADMIN — FENTANYL CITRATE 50 MCG: 50 INJECTION, SOLUTION INTRAMUSCULAR; INTRAVENOUS at 17:11

## 2022-03-14 RX ADMIN — VASOPRESSIN 4 UNITS: 20 INJECTION INTRAVENOUS at 14:10

## 2022-03-14 RX ADMIN — VASOPRESSIN 2 UNITS: 20 INJECTION INTRAVENOUS at 12:15

## 2022-03-14 RX ADMIN — Medication 10 MG: at 08:55

## 2022-03-14 RX ADMIN — SODIUM CHLORIDE: 9 INJECTION, SOLUTION INTRAVENOUS at 20:06

## 2022-03-14 RX ADMIN — FENTANYL CITRATE 50 MCG: 50 INJECTION, SOLUTION INTRAMUSCULAR; INTRAVENOUS at 18:56

## 2022-03-14 RX ADMIN — ALBUMIN (HUMAN) 250 ML: 12.5 SOLUTION INTRAVENOUS at 10:54

## 2022-03-14 RX ADMIN — Medication 140 MG: at 08:33

## 2022-03-14 RX ADMIN — CEFAZOLIN 2000 MG: 10 INJECTION, POWDER, FOR SOLUTION INTRAVENOUS at 12:24

## 2022-03-14 RX ADMIN — VASOPRESSIN 4 UNITS: 20 INJECTION INTRAVENOUS at 13:13

## 2022-03-14 RX ADMIN — HYDROMORPHONE HYDROCHLORIDE 0.5 MG: 1 INJECTION, SOLUTION INTRAMUSCULAR; INTRAVENOUS; SUBCUTANEOUS at 22:37

## 2022-03-14 RX ADMIN — PHENYLEPHRINE HYDROCHLORIDE 80 MCG: 10 INJECTION INTRAVENOUS at 15:57

## 2022-03-14 RX ADMIN — ROCURONIUM BROMIDE 5 MG: 10 INJECTION INTRAVENOUS at 08:33

## 2022-03-14 RX ADMIN — ROCURONIUM BROMIDE 10 MG: 10 INJECTION INTRAVENOUS at 11:29

## 2022-03-14 RX ADMIN — FENTANYL CITRATE 50 MCG: 50 INJECTION, SOLUTION INTRAMUSCULAR; INTRAVENOUS at 19:16

## 2022-03-14 RX ADMIN — VASOPRESSIN 2 UNITS: 20 INJECTION INTRAVENOUS at 17:02

## 2022-03-14 RX ADMIN — DEXMEDETOMIDINE HYDROCHLORIDE 0.2 MCG/KG/HR: 4 INJECTION, SOLUTION INTRAVENOUS at 20:30

## 2022-03-14 RX ADMIN — FAMOTIDINE 20 MG: 10 INJECTION, SOLUTION INTRAVENOUS at 21:22

## 2022-03-14 RX ADMIN — PROPOFOL 200 MG: 10 INJECTION, EMULSION INTRAVENOUS at 08:33

## 2022-03-14 RX ADMIN — Medication 10 MG: at 08:46

## 2022-03-14 RX ADMIN — CEFAZOLIN 2000 MG: 10 INJECTION, POWDER, FOR SOLUTION INTRAVENOUS at 15:34

## 2022-03-14 RX ADMIN — SODIUM CHLORIDE: 9 INJECTION, SOLUTION INTRAVENOUS at 13:59

## 2022-03-14 RX ADMIN — VASOPRESSIN 2 UNITS: 20 INJECTION INTRAVENOUS at 10:54

## 2022-03-14 RX ADMIN — VASOPRESSIN 2 UNITS: 20 INJECTION INTRAVENOUS at 15:25

## 2022-03-14 RX ADMIN — VASOPRESSIN 2 UNITS: 20 INJECTION INTRAVENOUS at 11:31

## 2022-03-14 RX ADMIN — SODIUM CHLORIDE: 9 INJECTION, SOLUTION INTRAVENOUS at 18:32

## 2022-03-14 RX ADMIN — HYDROMORPHONE HYDROCHLORIDE 0.5 MG: 2 INJECTION INTRAMUSCULAR; INTRAVENOUS; SUBCUTANEOUS at 11:15

## 2022-03-14 RX ADMIN — FENTANYL CITRATE 100 MCG: 50 INJECTION, SOLUTION INTRAMUSCULAR; INTRAVENOUS at 08:33

## 2022-03-14 RX ADMIN — VASOPRESSIN 2 UNITS: 20 INJECTION INTRAVENOUS at 10:40

## 2022-03-14 ASSESSMENT — PULMONARY FUNCTION TESTS
PIF_VALUE: 0
PIF_VALUE: 21
PIF_VALUE: 16
PIF_VALUE: 16
PIF_VALUE: 19
PIF_VALUE: 16
PIF_VALUE: 20
PIF_VALUE: 16
PIF_VALUE: 20
PIF_VALUE: 21
PIF_VALUE: 16
PIF_VALUE: 20
PIF_VALUE: 16
PIF_VALUE: 16
PIF_VALUE: 19
PIF_VALUE: 20
PIF_VALUE: 17
PIF_VALUE: 16
PIF_VALUE: 16
PIF_VALUE: 22
PIF_VALUE: 19
PIF_VALUE: 20
PIF_VALUE: 16
PIF_VALUE: 16
PIF_VALUE: 18
PIF_VALUE: 16
PIF_VALUE: 20
PIF_VALUE: 22
PIF_VALUE: 16
PIF_VALUE: 21
PIF_VALUE: 21
PIF_VALUE: 17
PIF_VALUE: 16
PIF_VALUE: 21
PIF_VALUE: 15
PIF_VALUE: 19
PIF_VALUE: 20
PIF_VALUE: 16
PIF_VALUE: 3
PIF_VALUE: 16
PIF_VALUE: 21
PIF_VALUE: 16
PIF_VALUE: 26
PIF_VALUE: 20
PIF_VALUE: 20
PIF_VALUE: 15
PIF_VALUE: 20
PIF_VALUE: 21
PIF_VALUE: 16
PIF_VALUE: 16
PIF_VALUE: 22
PIF_VALUE: 20
PIF_VALUE: 21
PIF_VALUE: 15
PIF_VALUE: 16
PIF_VALUE: 21
PIF_VALUE: 21
PIF_VALUE: 16
PIF_VALUE: 21
PIF_VALUE: 16
PIF_VALUE: 19
PIF_VALUE: 16
PIF_VALUE: 20
PIF_VALUE: 16
PIF_VALUE: 20
PIF_VALUE: 16
PIF_VALUE: 20
PIF_VALUE: 20
PIF_VALUE: 16
PIF_VALUE: 20
PIF_VALUE: 18
PIF_VALUE: 16
PIF_VALUE: 16
PIF_VALUE: 2
PIF_VALUE: 16
PIF_VALUE: 16
PIF_VALUE: 23
PIF_VALUE: 19
PIF_VALUE: 21
PIF_VALUE: 22
PIF_VALUE: 16
PIF_VALUE: 16
PIF_VALUE: 20
PIF_VALUE: 19
PIF_VALUE: 16
PIF_VALUE: 16
PIF_VALUE: 19
PIF_VALUE: 16
PIF_VALUE: 16
PIF_VALUE: 20
PIF_VALUE: 16
PIF_VALUE: 22
PIF_VALUE: 16
PIF_VALUE: 20
PIF_VALUE: 1
PIF_VALUE: 0
PIF_VALUE: 16
PIF_VALUE: 16
PIF_VALUE: 17
PIF_VALUE: 20
PIF_VALUE: 16
PIF_VALUE: 19
PIF_VALUE: 16
PIF_VALUE: 21
PIF_VALUE: 16
PIF_VALUE: 20
PIF_VALUE: 20
PIF_VALUE: 16
PIF_VALUE: 16
PIF_VALUE: 21
PIF_VALUE: 16
PIF_VALUE: 16
PIF_VALUE: 20
PIF_VALUE: 16
PIF_VALUE: 20
PIF_VALUE: 26
PIF_VALUE: 21
PIF_VALUE: 20
PIF_VALUE: 21
PIF_VALUE: 16
PIF_VALUE: 25
PIF_VALUE: 16
PIF_VALUE: 20
PIF_VALUE: 16
PIF_VALUE: 19
PIF_VALUE: 17
PIF_VALUE: 16
PIF_VALUE: 16
PIF_VALUE: 20
PIF_VALUE: 16
PIF_VALUE: 10
PIF_VALUE: 20
PIF_VALUE: 16
PIF_VALUE: 2
PIF_VALUE: 16
PIF_VALUE: 20
PIF_VALUE: 16
PIF_VALUE: 21
PIF_VALUE: 16
PIF_VALUE: 20
PIF_VALUE: 20
PIF_VALUE: 21
PIF_VALUE: 19
PIF_VALUE: 16
PIF_VALUE: 16
PIF_VALUE: 20
PIF_VALUE: 16
PIF_VALUE: 21
PIF_VALUE: 8
PIF_VALUE: 16
PIF_VALUE: 28
PIF_VALUE: 16
PIF_VALUE: 16
PIF_VALUE: 20
PIF_VALUE: 20
PIF_VALUE: 22
PIF_VALUE: 16
PIF_VALUE: 16
PIF_VALUE: 21
PIF_VALUE: 16
PIF_VALUE: 20
PIF_VALUE: 16
PIF_VALUE: 21
PIF_VALUE: 16
PIF_VALUE: 16
PIF_VALUE: 21
PIF_VALUE: 16
PIF_VALUE: 20
PIF_VALUE: 16
PIF_VALUE: 18
PIF_VALUE: 20
PIF_VALUE: 16
PIF_VALUE: 18
PIF_VALUE: 21
PIF_VALUE: 19
PIF_VALUE: 21
PIF_VALUE: 16
PIF_VALUE: 22
PIF_VALUE: 21
PIF_VALUE: 16
PIF_VALUE: 20
PIF_VALUE: 16
PIF_VALUE: 24
PIF_VALUE: 16
PIF_VALUE: 20
PIF_VALUE: 16
PIF_VALUE: 7
PIF_VALUE: 16
PIF_VALUE: 16
PIF_VALUE: 15
PIF_VALUE: 16
PIF_VALUE: 16
PIF_VALUE: 20
PIF_VALUE: 20
PIF_VALUE: 16
PIF_VALUE: 3
PIF_VALUE: 16
PIF_VALUE: 1
PIF_VALUE: 16
PIF_VALUE: 20
PIF_VALUE: 16
PIF_VALUE: 21
PIF_VALUE: 17
PIF_VALUE: 16
PIF_VALUE: 20
PIF_VALUE: 20
PIF_VALUE: 16
PIF_VALUE: 17
PIF_VALUE: 16
PIF_VALUE: 21
PIF_VALUE: 16
PIF_VALUE: 20
PIF_VALUE: 16
PIF_VALUE: 25
PIF_VALUE: 16
PIF_VALUE: 20
PIF_VALUE: 16
PIF_VALUE: 20
PIF_VALUE: 16
PIF_VALUE: 16
PIF_VALUE: 18
PIF_VALUE: 16
PIF_VALUE: 16
PIF_VALUE: 19
PIF_VALUE: 16
PIF_VALUE: 16
PIF_VALUE: 22
PIF_VALUE: 19
PIF_VALUE: 16
PIF_VALUE: 16
PIF_VALUE: 17
PIF_VALUE: 19
PIF_VALUE: 16
PIF_VALUE: 16
PIF_VALUE: 0
PIF_VALUE: 21
PIF_VALUE: 16
PIF_VALUE: 27
PIF_VALUE: 19
PIF_VALUE: 16
PIF_VALUE: 23
PIF_VALUE: 16
PIF_VALUE: 20
PIF_VALUE: 16
PIF_VALUE: 21
PIF_VALUE: 19
PIF_VALUE: 16
PIF_VALUE: 18
PIF_VALUE: 19
PIF_VALUE: 19
PIF_VALUE: 22
PIF_VALUE: 20
PIF_VALUE: 16
PIF_VALUE: 3
PIF_VALUE: 16
PIF_VALUE: 20
PIF_VALUE: 20
PIF_VALUE: 16
PIF_VALUE: 20
PIF_VALUE: 16
PIF_VALUE: 16
PIF_VALUE: 19
PIF_VALUE: 19
PIF_VALUE: 20
PIF_VALUE: 16
PIF_VALUE: 20
PIF_VALUE: 16
PIF_VALUE: 22
PIF_VALUE: 17
PIF_VALUE: 16
PIF_VALUE: 22
PIF_VALUE: 19
PIF_VALUE: 16
PIF_VALUE: 20
PIF_VALUE: 16
PIF_VALUE: 21
PIF_VALUE: 16
PIF_VALUE: 19
PIF_VALUE: 21
PIF_VALUE: 2
PIF_VALUE: 12
PIF_VALUE: 16
PIF_VALUE: 22
PIF_VALUE: 16
PIF_VALUE: 19
PIF_VALUE: 20
PIF_VALUE: 23
PIF_VALUE: 16
PIF_VALUE: 22
PIF_VALUE: 16
PIF_VALUE: 1
PIF_VALUE: 16
PIF_VALUE: 20
PIF_VALUE: 21
PIF_VALUE: 16
PIF_VALUE: 15
PIF_VALUE: 16
PIF_VALUE: 19
PIF_VALUE: 17
PIF_VALUE: 16
PIF_VALUE: 15
PIF_VALUE: 20
PIF_VALUE: 16
PIF_VALUE: 16
PIF_VALUE: 23
PIF_VALUE: 16
PIF_VALUE: 20
PIF_VALUE: 16
PIF_VALUE: 26
PIF_VALUE: 20
PIF_VALUE: 21
PIF_VALUE: 20
PIF_VALUE: 16
PIF_VALUE: 16
PIF_VALUE: 20
PIF_VALUE: 25
PIF_VALUE: 16
PIF_VALUE: 22
PIF_VALUE: 16
PIF_VALUE: 16
PIF_VALUE: 19
PIF_VALUE: 17
PIF_VALUE: 16
PIF_VALUE: 2
PIF_VALUE: 4
PIF_VALUE: 16
PIF_VALUE: 16
PIF_VALUE: 20
PIF_VALUE: 16
PIF_VALUE: 20
PIF_VALUE: 16
PIF_VALUE: 24
PIF_VALUE: 16
PIF_VALUE: 21
PIF_VALUE: 16
PIF_VALUE: 3
PIF_VALUE: 16
PIF_VALUE: 21
PIF_VALUE: 20
PIF_VALUE: 20
PIF_VALUE: 16
PIF_VALUE: 16
PIF_VALUE: 19
PIF_VALUE: 16
PIF_VALUE: 21
PIF_VALUE: 19
PIF_VALUE: 16
PIF_VALUE: 21
PIF_VALUE: 16
PIF_VALUE: 21
PIF_VALUE: 1
PIF_VALUE: 16
PIF_VALUE: 21
PIF_VALUE: 19
PIF_VALUE: 8
PIF_VALUE: 16
PIF_VALUE: 18
PIF_VALUE: 16
PIF_VALUE: 21
PIF_VALUE: 16
PIF_VALUE: 21
PIF_VALUE: 16
PIF_VALUE: 21
PIF_VALUE: 16
PIF_VALUE: 20
PIF_VALUE: 16
PIF_VALUE: 21
PIF_VALUE: 16
PIF_VALUE: 19
PIF_VALUE: 16
PIF_VALUE: 18
PIF_VALUE: 20
PIF_VALUE: 16
PIF_VALUE: 16
PIF_VALUE: 20
PIF_VALUE: 16
PIF_VALUE: 16
PIF_VALUE: 19
PIF_VALUE: 16
PIF_VALUE: 19
PIF_VALUE: 16
PIF_VALUE: 0
PIF_VALUE: 22
PIF_VALUE: 16
PIF_VALUE: 15
PIF_VALUE: 16
PIF_VALUE: 16
PIF_VALUE: 22
PIF_VALUE: 20
PIF_VALUE: 16
PIF_VALUE: 16
PIF_VALUE: 19
PIF_VALUE: 16
PIF_VALUE: 19
PIF_VALUE: 16
PIF_VALUE: 19
PIF_VALUE: 16
PIF_VALUE: 19
PIF_VALUE: 16
PIF_VALUE: 20
PIF_VALUE: 20
PIF_VALUE: 16
PIF_VALUE: 22
PIF_VALUE: 19
PIF_VALUE: 16
PIF_VALUE: 20
PIF_VALUE: 22
PIF_VALUE: 16
PIF_VALUE: 16
PIF_VALUE: 21
PIF_VALUE: 20
PIF_VALUE: 16
PIF_VALUE: 20
PIF_VALUE: 20
PIF_VALUE: 21
PIF_VALUE: 16
PIF_VALUE: 19
PIF_VALUE: 20
PIF_VALUE: 16
PIF_VALUE: 19
PIF_VALUE: 9
PIF_VALUE: 17
PIF_VALUE: 17
PIF_VALUE: 16
PIF_VALUE: 24
PIF_VALUE: 16
PIF_VALUE: 20
PIF_VALUE: 16
PIF_VALUE: 20
PIF_VALUE: 19
PIF_VALUE: 24
PIF_VALUE: 16
PIF_VALUE: 21
PIF_VALUE: 19
PIF_VALUE: 16
PIF_VALUE: 20
PIF_VALUE: 17
PIF_VALUE: 21
PIF_VALUE: 20
PIF_VALUE: 16
PIF_VALUE: 21
PIF_VALUE: 20
PIF_VALUE: 16
PIF_VALUE: 19
PIF_VALUE: 16
PIF_VALUE: 17
PIF_VALUE: 16
PIF_VALUE: 18
PIF_VALUE: 16
PIF_VALUE: 19
PIF_VALUE: 19
PIF_VALUE: 20
PIF_VALUE: 16
PIF_VALUE: 23
PIF_VALUE: 16
PIF_VALUE: 21
PIF_VALUE: 16
PIF_VALUE: 19
PIF_VALUE: 16
PIF_VALUE: 2
PIF_VALUE: 16
PIF_VALUE: 19
PIF_VALUE: 16
PIF_VALUE: 19

## 2022-03-14 ASSESSMENT — ENCOUNTER SYMPTOMS: SHORTNESS OF BREATH: 1

## 2022-03-14 ASSESSMENT — PAIN SCALES - GENERAL
PAINLEVEL_OUTOF10: 7
PAINLEVEL_OUTOF10: 9
PAINLEVEL_OUTOF10: 4
PAINLEVEL_OUTOF10: 0
PAINLEVEL_OUTOF10: 7

## 2022-03-14 ASSESSMENT — COPD QUESTIONNAIRES: CAT_SEVERITY: MODERATE

## 2022-03-14 ASSESSMENT — LIFESTYLE VARIABLES: SMOKING_STATUS: 1

## 2022-03-14 NOTE — ANESTHESIA PROCEDURE NOTES
Arterial Line:    An arterial line was placed in the OR for the following indication(s): continuous blood pressure monitoring and blood sampling needed. A (size), (length), Arrow (type) catheter was placed, Seldinger technique used, into the left radial artery, secured by tape and Tegaderm.   Anesthesia type: General  Anesthesiologist: Gertrudis Merchant DO  Performed: Anesthesiologist   Preanesthetic Checklist  Completed: patient identified, IV checked, site marked, risks and benefits discussed, surgical consent, monitors and equipment checked, pre-op evaluation, timeout performed, anesthesia consent given, oxygen available and patient being monitored

## 2022-03-14 NOTE — FLOWSHEET NOTE
03/14/22 0720   Encounter Summary   Services provided to: Patient and family together   Referral/Consult From: [address] [place] [address]; Family members   Continue Visiting Yes  (3/14 pre surgery)   Complexity of Encounter Low   Length of Encounter 15 minutes   Routine   Type Pre-procedure   Spiritual/Christianity   Type Spiritual support   Pre surgery contact with prayer.

## 2022-03-14 NOTE — PROGRESS NOTES
Pt admitted to [facility] room 20 and oriented to unit. SCD sleeves applied. Nares swabbed. Pt verbalized permission for first name, last initial and physicians name on white board. SDS board and discharge criteria explained, pt and family verbalized understanding. Pt denies thoughts of harming self or others. Call light in reach. Family at the bedside.

## 2022-03-14 NOTE — CONSULTS
CRITICAL CARE CONSULT NOTE      Patient:  Nicole Jasmine    Unit/Bed:4D-12/012-A  YOB: 1955  MRN: [id number]   PCP: David Magdaleno MD  Date of Admission: 3/14/2022  Chief Complaint:- acute respiratory failure    Assessment and Plan:    1. Acute pulmonary insufficiency following surgery:  3/14/2022 Patient arrived to the ICU s/p OR intervention intubated. Continue with lung protection strategies targeting a peak pressure 35 and less and plateau of 30 and less. Goal to attempt extubation in the next couple of hours once awaken and following commands. 2. Post laminectomy syndrome/chronic pain:  3/15/2022 Revision previous lumbar spine decompression instrumentation and fusion with extension to the pelvis T10 and pelvis. EBL 2000 ML. Hemovac X2 to continue Left and Right medial back. Maintain MAP>85. Dr. Nellie Garsia to manage. 3. Acute blood loss anemia:  Monitor and trend target H/H 7.0  4. Impaired glucose tolerange:  Repeat BMP if glucose 150 or greater will need to evaluate with HgBA1c. 3/14/2022 Decadron was given intra-operatively. 5. Essential HPTN:  History, Lisinopril continued with parameters to hold. 6. COPD:  Stable, history, monitor. No PFT found in Epic. Albuterol prn. INITIAL H AND P AND ICU COURSE:  Sukumar Mosquera is a 72-year-old  male admitted to [facility] ICU on 3/14/2022 status post or intervention. Patient has past medical history significant for a smoker, COPD, Essential HPTN, Bradycardia 3/1/2022 -Lexiscan stress test negative for ischemia. ECHO LVEF 55% with grade 1 diastolic dysfunction,  Dyslipidemia, GERD, Depression, Anxiety. Carlie Stokes presented to [address] [place] [address] 3/14/2022 under the care of Dr. Nellie Garsia with complaint of severe back pain. This is aggravated with standing and activity with radiation to both hips. 9/10.  Patient under went surgical intervention of revision previous lumbar spine decompression instrumentation and fusion with extension to the pelvis T10 and pelvis. Patient was transferred to the ICU post operatively for further management and care. Past Medical History: See HPI. Family History: Mother , father . Social History: Current everyday smoker, denies any alcohol or illicit drug use. Patient is     ROS   GENERAL: Positive for fatigue and weakness   SKN: Positive for posterior back dressing  HEAD: No recent injury  EYES: No drainage  EARS: No drainage  NOSE/THROAT: No nasal drainage, positive for poor dentition  NECK: No lumps or unusual neck stiffness  PULMONARY: Positive for oral intubation  CARDIAC: Positive for tachycardia  GI: No melena or hematochezia, no diarrhea or constipation  PERIPHERAL VASCULAR: No intermittent claudication or unusual leg cramps  MUSCULOSKELETAL: Occasional arthralgias, myalgias  NEUROLOGICAL: No Seizures or Syncope  HEMATOLOGIC:  No unusual bruising or bleeding  PSYCH: No homicidal or suicidal ideations    Scheduled Meds:  Continuous Infusions:   sodium chloride 75 mL/hr at 22 0805    sodium chloride      dexmedetomidine      propofol 30 mcg/kg/min (22)    norepinephrine         PHYSICAL EXAMINATION:  T:  99.  P: 101. RR: 24. B/P: 127/74. FiO2: 100. O2 Sat: 94.  I/O: Pending  Body mass index is 30.28 kg/m². GCS:   3  General:   Pale diaphoretic acutely ill in appearance  HEENT:  normocephalic and atraumatic. No scleral icterus, sclera is reddened bilaterally. PERR, poor dentition  Neck: supple. No Thyromegaly. Lungs: clear to auscultation-coarse breath sounds throughout. No retractions. Orally intubated and connected to PCV mode ventilation  Cardiac: RRR-tachycardic no JVD. Abdomen: soft. Nontender, bowel sounds present. OG LIWS  Extremities:  No clubbing, cyanosis, or edema x 4. Back: Accordion drain x2 present from back incision. Noted mild edema along incisional line. Dressing over incisional line small amount of sanguinous drainage noted.   Vasculature: capillary refill < 3 seconds. Palpable dorsalis pedis pulses. Skin:  warm and dry. Psych: Intubated and sedated affect appropriate  Lymph:  No supraclavicular adenopathy. Neurologic:  No focal deficit. No seizures. Spontaneous movement x4 extremities, patient does withdraw from pain in all 4 extremities. Data: (All radiographs, tracings, PFTs, and imaging are personally viewed and interpreted unless otherwise noted).  3/14/2022 1827 sodium 145   Ionized calcium 1.3   Potassium 4.1   Glucose 154   ABG pH 7.36 PCO2 41 PO2 118 bicarb 23   3/1/2022 echo LVEF 65%, grade 1 diastolic dysfunction.  Cardiac telemetry sinus tachycardia      Seen with multidisciplinary ICU team yes. Meets Continued ICU Level Care Criteria:    [x] Yes   [] No - Transfer Planned to listed location:  [] HOSPITALIST CONTACTED-      Case and plan discussed with Dr. Ajith Phillips.         Electronically signed by FALLON Guzman - CNP  CRITICAL CARE SPECIALIST

## 2022-03-14 NOTE — ANESTHESIA PRE PROCEDURE
Department of Anesthesiology  Preprocedure Note       Name:  Cherrie Braden   Age:  61 y.o.  :  1955                                          MRN:  [id number]         Date:  3/14/2022      Surgeon: Paige Franco):  Juanita Lam MD    Procedure: Procedure(s):  Revision previous lumbar spine decompression instrumentation & fusion with extension to the pelvis T10 and pelvis plus possible interbody cage placement    Medications prior to admission:   Prior to Admission medications    Medication Sig Start Date End Date Taking? Authorizing Provider   tapentadol (NUCYNTA) 100 MG TABS Take 1 tablet by mouth 2 times daily as needed for Pain for up to 30 days.  3/3/22 4/2/22 Yes FALLON Santos CNP   tiZANidine (ZANAFLEX) 4 MG tablet Take 1 tablet by mouth every 8 hours as needed (muscle spasms) 3/3/22 6/1/22 Yes Linden Quezada DO   gabapentin (NEURONTIN) 800 MG tablet TAKE 1 TABLET BY MOUTH THREE TIMES A DAY 2/10/22 4/8/22 Yes FALLON Santos CNP   lisinopril (PRINIVIL;ZESTRIL) 5 MG tablet Take 5 mg by mouth daily 3/15/21  Yes Historical Provider, MD   aspirin 81 MG chewable tablet Take 1 tablet by mouth daily 17  Yes Ashlie Morris MD   albuterol (PROVENTIL) (5 MG/ML) 0.5% nebulizer solution Take 2.5 mg by nebulization every 6 hours as needed for Wheezing    Historical Provider, MD       Current medications:    Current Facility-Administered Medications   Medication Dose Route Frequency Provider Last Rate Last Admin    0.9 % sodium chloride infusion   IntraVENous Continuous Fauzia Sow PA-C        sodium chloride flush 0.9 % injection 5-40 mL  5-40 mL IntraVENous 2 times per day Fauzia Sow PA-C        sodium chloride flush 0.9 % injection 5-40 mL  5-40 mL IntraVENous PRN Fauzia Sow PA-C        0.9 % sodium chloride infusion  25 mL IntraVENous PRN Fauzia Sow PA-C        ceFAZolin (ANCEF) 2000 mg in dextrose 5 % 50 mL IVPB  2,000 mg IntraVENous On Call fusion C3-5? Dr. Clements Means Bilateral     Dr. Na Chavez  2014   Nicole Curran Right 9/28/2021    right cluneal nerve block performed by Ashish Medley DO at [address] [place] [address] UNLISTED N/A 4/18/2018    LUMBAR LAMINECTOMY WITH PSF L2-S1, PLIF L5-S1 WITH SOLERA 5.5 CAPSTONE AND OPLA [place] performed by Hilton Lewis MD at [address] Left 01/2016    Dr. Cristhian Gaytan @ [facility]    UPPER GASTROINTESTINAL ENDOSCOPY         Social History:    Social History     Tobacco Use    Smoking status: Current Every Day Smoker     Packs/day: 0.50     Years: 48.00     Pack years: 24.00     Types: Cigarettes    Smokeless tobacco: Never Used   Substance Use Topics    Alcohol use: Yes     Alcohol/week: 0.0 standard drinks     Comment: occasionnally                                Ready to quit: Not Answered  Counseling given: Not Answered      Vital Signs (Current):   Vitals:    03/09/22 1122 03/14/22 0558   BP:  109/72   Pulse:  81   Resp:  18   Temp:  97.6 °F (36.4 °C)   SpO2:  95%   Weight: 200 lb (90.7 kg)    Height: 5' 10\" (1.778 m)                                               BP Readings from Last 3 Encounters:   03/14/22 109/72   02/18/22 126/78   01/12/22 128/73       NPO Status: Time of last liquid consumption: 2100 (sip of water with medicaiton at 0500 this am)                        Time of last solid consumption: 2100                        Date of last liquid consumption: 03/13/22                        Date of last solid food consumption: 03/13/22    BMI:   Wt Readings from Last 3 Encounters:   03/09/22 200 lb (90.7 kg)   02/18/22 209 lb (94.8 kg)   01/12/22 209 lb 12.8 oz (95.2 kg)     Body mass index is 28.7 kg/m².     CBC:   Lab Results   Component Value Date    WBC 9.3 01/12/2022    RBC 5.33 01/12/2022    HGB 15.9 01/12/2022    HCT 48.8 01/12/2022    MCV 91.6 01/12/2022    RDW 14.3 04/21/2018

## 2022-03-14 NOTE — OP NOTE
[address] [address]  RECORD OF OPERATION    Patient name: Maty Rosario  Medical Record Number: [id number]  Account Number: [de-identified]      Date of Procedure: 3/14/2022    Pre-operative Diagnosis:     · SI joint disease. · Spinal deformity  · Postlaminectomy syndrome  · Failed back syndrome  · Post lumbar spine instrumentation and fusion. · Adjustment segment disease. · Spinal stenosis at the level of L1-L2   · Diffuse Spinal degenerative disease especially at the levels of T10-11 and L1-2  · Spinal disc disease at multiple levels. · Lumbar spinal spondylosis. · Progressive severe low back pain. · Right leg weakness. Post-operative Diagnosis: The same    PROCEDURE:      · Reexploration of her previous lumbar spine instrumentation and fusion from L2-S1. · Removal of disc space at the level of L1-L2 from the right. · Revision of posterior and lateral spine decompression at the level of L4-L5 in both sides. · New posterior spine decompression from T12 to L2. · Extension of previous lumbar spine instrumentation and fusion to T10 and to plevis as follow (utilizing [employer] spine posterior instrumentation system):     1-    Placement of 5.5 x 50 pedicle screw  at the level of T10 in both sides. 2-    Placement of 5.5 x 50 pedicle screw  at the level of T11 in both sides. 3 -   Placement of 5.5 x 55  pedicle screws at the T12  in both sides. 4-    Placement of 5.5 x 55 pedicle screws at the L1  in both sides. 5-    Placement of 4.5 x 40 pedicle screws at the L2  in both sides. 6-    Placement of 8.5 x 80 pedicle screws at the S2-AI  screws in both sides. 7-  Placement of  (about 500 mm) jeniffer in both sides. 8-  Placement of of 2 crosslinks     · Application of biological bone graft ([place]. · Neurophysiology monitoring. ·  Using the intraoperative  O-arm Scan. · Using the intraoperative neuro navigation system.      Anesthesia: General endotracheal     Surgeons/Assistants: Rubén Arthur MD      Estimated Blood Loss: 2000 ml     Drains: 2 ADELFO drains. Blood Transfusions:      Complications: none immediately appreciated     Specimens: None. Indications for Procedure: This is a 60 year old male with a complex lumbar spine issue due to a combination of severe spine degenerative disease,  SI joint disease, spine deformity, post laminectomy syndrome,  failed back syndrome, spinal deformity, adjustment segment disease and possible underlying spinal instability.  -Patient has a progressive history of severe back pain and left leg weakness over the last 3 years.  -Patient underwent lumbar spine imaging studies (MRI and CTs) also significant for:    · SI joint disease. · Spinal deformity  · Post lumbar spine instrumentation and fusion. · Adjustment segment disease. · Spinal stenosis at the level of L1-L2   · Diffuse Spinal degenerative disease especially at the levels of T10-11 and L1-2  · Spinal disc disease at multiple levels. · Lumbar spinal spondylosis. Decision making:     Based on patient symptoms, the clinical history of his symptoms, his symptoms response to several treatment modalities(including the lumbar spine injection) so far, as well as his body habitus, I recommend for patient a surgical intervention mainly in the form of:\"  Revision of previous lumbar spine decompression, instrumentation and fusion with extension to the pelvis T10 and pelvis+ possible interbody cage placement as indicated during the surgery\".    -I explained again to the patient the recommended surgical intervention in detail including the associated risks and benefits, as well as, the alternative treatment options (which are mainly to have another round of lumbar spine injections or spinal cord stimulator trail). -  All questions and concerns were addressed and answered. -  Patient  agreed to undergo surgery and patient signed the consent.         PROCEDURE:        Patient was brought to the OR where He was placed supine initially, general anesthesia was inducted and IV line, A-lines,  Nguyen catheter were inserted and maintained. Then, neurophysiology team  connected their neurophysiology electrodes according to their protocol. Next, patient turned in prone position on Miltons table. Then, we marked the skin  incision, Next, we prepped and draped the patient in a standard fashion. Next, we localized the levels of T10-S2 using initially  intraoperative C-arm  then, we  proceeded with making the skin incision. So I made incision in the previous skin incision with some extension rostally. I discussed the scar tissue and the muscles in both sides all the way to the transverse process in both sides from T 10-S2. After that, I focused on exposing the previous lumbar spine instrumentation fusion from L2-S1. I managed to remove the old cross-link and old rods in both side and I kept the old pedicle screws (Medtronic pedicle screws) from L2-S1 on both sides. After that, we applied the patient tracker of the Stealth neuro navigation (Convio) to the spinous process of L1. Following that we scanned the patient's spine from T10-S2 by the O-arm intraoperative scan. Next, we uploaded the patient's image set to the [address] [place] [address]. We did registration between the patient and the Stealth neuro navigation machine. As we satisfied with the accuracy with intraoperative neuro navigation machine registration,  and under the guidance of intraoperative neuro navigation and neurophysiology monitoring,  I proceeded with placing new  posterior spine instrumentation from T10-to L1 as follow (by utilizing [employer] posterior spine instrumentation system):     1-    Placement of 5.5 x 50 pedicle screw  at the level of T10 in both sides. 2-    Placement of 5.5 x 50 pedicle screw  at the level of T11 in both sides.    3 -   Placement of 5.5 x 55  pedicle screws at the T12  in both sides.  4-    Placement of 5.5 x 55 pedicle screws at the L1  in both sides. 5-    Placement of 4.5 x 40 pedicle screws at the L2  in both sides. Next, I proceeded with the placement of bilateral 8.5 x 80 pedicle screws at the S2-AI  screws in both sides again under the guidance of intraoperative neuro navigation and neurophysiology monitoring. After the placement of S2-AI  screws in both sides, we performed another intra intraoperative CT (O-arm scan scan)  for patient spine to verify the position of the all screws. - As we satisfied with the location of the intrapedicle screws based on the combination of  the feedbacks of the neurophysiology monitoring/stimulation, intraoperative O-arm scan,  we proceeded with performing posterior decompression (decompressive laminectomy) from T12-L2. Next, I brought the microscope and and performed removal for the disc space at the level of L1-L2 from the right. The initial plan was to place a cage in that level from the right to correct the dural deformity of the lumbar spine at that level, however I noticed that there was a severe collapse in the disc space at that level and associated with significant adhesions. For that reason, I elected not to place any cage at that level as I thought that could associated with injury to the dura and the spinal nerves at that level. Next, next I did a revision for the posterior lateral decompression at the level of L4-L5 by breaking and removing the previous fusion at that level and open the foramens in both sides at that level (as I thought that this bone removal and decompression may help to restore the normal alignment of patient spine further given the residual spondylolisthesis at that level).        Next, and after we satisfied with the intrapedicle screws  placements, the bilateral S2-AI  screws placements and the degree of posterior decompression that I achieved, we proceeded with placing a jeniffer in both sides and 2 cross-links as highlighted above. Next, we proceeded with meticulous hemostasis. Next, we applied a biological bone graft  in both sides around the screws especially around the lower lumbar screws. Next proceeded with copious irrigation, then we closed the wound in standard fashion after placing two ADELFO drains and vancomycin powder. The patient tolerated the surgery very well without intraoperative complications. At the end of surgery patient was flipped back on his  bed. Then he was kept  intubated (per anesthesia recommendation) and was transferred to the ICU for further observation and treatment. *Note 1: Dane Dawson PA-C ( Neurosurgery PA) assisted throughout the procedure with positioning, draping, retraction, wound closure, and dressing application     **Note2: Patient has a BMI of 34. [facility] The blood loss was about 2000 cc. He has previous lumbar spine surgery that caused significant scar tissues and adhesions in the area of the current surgery hence MODIFIER 22 needs to applied.       Miguel Sykes MD, MD  Electronically signed by me on 3/14/2022 at 8:41 AM

## 2022-03-14 NOTE — PROGRESS NOTES
Patient was seen and examined in the pre op area in conjunction with neurosurgery LARS Newman PA-C). There are no changes in patient symptoms and neurological exam since the last time we saw him in neurosurgery out patient clinic. Patient continues to experience severe back pain especially with standing and activity that goes to both hip areas that is up to 9-10/10. Today I discussed with patient and his family again today planned/recommended surgical intervention as well as the associated risk and benefit and alternative. All questions and concerns were addressed and answered. Patient elected again to go with today planned surgical intervention.

## 2022-03-14 NOTE — PROGRESS NOTES
Unable to obtain MRSA screen due to pre cleansing of nares completed. Dr. Jeana High and Dr. Ken Arteaga aware of patient having loose tooth in mouth pt informed RN he bit into apple yesterday. No new orders given per providers.

## 2022-03-15 ENCOUNTER — APPOINTMENT (OUTPATIENT)
Dept: GENERAL RADIOLOGY | Age: 67
DRG: 459 | End: 2022-03-15
Attending: NEUROLOGICAL SURGERY
Payer: MEDICARE

## 2022-03-15 LAB
ALBUMIN SERPL-MCNC: 3.7 G/DL (ref 3.5–5.1)
ALP BLD-CCNC: 57 U/L (ref 38–126)
ALT SERPL-CCNC: 14 U/L (ref 11–66)
ANION GAP SERPL CALCULATED.3IONS-SCNC: 12 MEQ/L (ref 8–16)
AST SERPL-CCNC: 32 U/L (ref 5–40)
BASOPHILS # BLD: 0.1 %
BASOPHILS ABSOLUTE: 0 THOU/MM3 (ref 0–0.1)
BILIRUB SERPL-MCNC: 0.5 MG/DL (ref 0.3–1.2)
BUN BLDV-MCNC: 20 MG/DL (ref 7–22)
CALCIUM SERPL-MCNC: 7.7 MG/DL (ref 8.5–10.5)
CHLORIDE BLD-SCNC: 112 MEQ/L (ref 98–111)
CO2: 19 MEQ/L (ref 23–33)
CREAT SERPL-MCNC: 0.7 MG/DL (ref 0.4–1.2)
EOSINOPHIL # BLD: 0.1 %
EOSINOPHILS ABSOLUTE: 0 THOU/MM3 (ref 0–0.4)
ERYTHROCYTE [DISTWIDTH] IN BLOOD BY AUTOMATED COUNT: 15.1 % (ref 11.5–14.5)
ERYTHROCYTE [DISTWIDTH] IN BLOOD BY AUTOMATED COUNT: 50.4 FL (ref 35–45)
GFR SERPL CREATININE-BSD FRML MDRD: > 90 ML/MIN/1.73M2
GLUCOSE BLD-MCNC: 122 MG/DL (ref 70–108)
HCT VFR BLD CALC: 32.6 % (ref 42–52)
HEMOGLOBIN: 10.7 GM/DL (ref 14–18)
IMMATURE GRANS (ABS): 0.06 THOU/MM3 (ref 0–0.07)
IMMATURE GRANULOCYTES: 0.5 %
LYMPHOCYTES # BLD: 12.6 %
LYMPHOCYTES ABSOLUTE: 1.6 THOU/MM3 (ref 1–4.8)
MCH RBC QN AUTO: 30.1 PG (ref 26–33)
MCHC RBC AUTO-ENTMCNC: 32.8 GM/DL (ref 32.2–35.5)
MCV RBC AUTO: 91.6 FL (ref 80–94)
MONOCYTES # BLD: 11.7 %
MONOCYTES ABSOLUTE: 1.5 THOU/MM3 (ref 0.4–1.3)
NUCLEATED RED BLOOD CELLS: 0 /100 WBC
PLATELET # BLD: 164 THOU/MM3 (ref 130–400)
PMV BLD AUTO: 10.6 FL (ref 9.4–12.4)
POTASSIUM SERPL-SCNC: 4.3 MEQ/L (ref 3.5–5.2)
RBC # BLD: 3.56 MILL/MM3 (ref 4.7–6.1)
SEG NEUTROPHILS: 75 %
SEGMENTED NEUTROPHILS ABSOLUTE COUNT: 9.3 THOU/MM3 (ref 1.8–7.7)
SODIUM BLD-SCNC: 143 MEQ/L (ref 135–145)
TOTAL PROTEIN: 5.4 G/DL (ref 6.1–8)
WBC # BLD: 12.4 THOU/MM3 (ref 4.8–10.8)

## 2022-03-15 PROCEDURE — 99024 POSTOP FOLLOW-UP VISIT: CPT | Performed by: NEUROLOGICAL SURGERY

## 2022-03-15 PROCEDURE — 3209999900 FLUORO FOR SURGICAL PROCEDURES

## 2022-03-15 PROCEDURE — 80053 COMPREHEN METABOLIC PANEL: CPT

## 2022-03-15 PROCEDURE — 99232 SBSQ HOSP IP/OBS MODERATE 35: CPT | Performed by: INTERNAL MEDICINE

## 2022-03-15 PROCEDURE — 6370000000 HC RX 637 (ALT 250 FOR IP): Performed by: NURSE PRACTITIONER

## 2022-03-15 PROCEDURE — 6370000000 HC RX 637 (ALT 250 FOR IP): Performed by: PHYSICIAN ASSISTANT

## 2022-03-15 PROCEDURE — 2580000003 HC RX 258: Performed by: NURSE PRACTITIONER

## 2022-03-15 PROCEDURE — 2060000000 HC ICU INTERMEDIATE R&B

## 2022-03-15 PROCEDURE — 6360000002 HC RX W HCPCS: Performed by: PHYSICIAN ASSISTANT

## 2022-03-15 PROCEDURE — 2700000000 HC OXYGEN THERAPY PER DAY

## 2022-03-15 PROCEDURE — 2500000003 HC RX 250 WO HCPCS: Performed by: NURSE PRACTITIONER

## 2022-03-15 PROCEDURE — APPSS30 APP SPLIT SHARED TIME 16-30 MINUTES: Performed by: PHYSICIAN ASSISTANT

## 2022-03-15 PROCEDURE — 6360000002 HC RX W HCPCS: Performed by: NURSE PRACTITIONER

## 2022-03-15 PROCEDURE — 94761 N-INVAS EAR/PLS OXIMETRY MLT: CPT

## 2022-03-15 PROCEDURE — 85025 COMPLETE CBC W/AUTO DIFF WBC: CPT

## 2022-03-15 RX ADMIN — OXYCODONE AND ACETAMINOPHEN 1 TABLET: 5; 325 TABLET ORAL at 23:58

## 2022-03-15 RX ADMIN — SODIUM CHLORIDE: 9 INJECTION, SOLUTION INTRAVENOUS at 20:32

## 2022-03-15 RX ADMIN — SODIUM CHLORIDE, PRESERVATIVE FREE 10 ML: 5 INJECTION INTRAVENOUS at 19:56

## 2022-03-15 RX ADMIN — OXYCODONE AND ACETAMINOPHEN 1 TABLET: 5; 325 TABLET ORAL at 03:02

## 2022-03-15 RX ADMIN — HYDROMORPHONE HYDROCHLORIDE 0.5 MG: 1 INJECTION, SOLUTION INTRAMUSCULAR; INTRAVENOUS; SUBCUTANEOUS at 04:41

## 2022-03-15 RX ADMIN — FAMOTIDINE 20 MG: 10 INJECTION, SOLUTION INTRAVENOUS at 08:08

## 2022-03-15 RX ADMIN — HYDROMORPHONE HYDROCHLORIDE 0.5 MG: 1 INJECTION, SOLUTION INTRAMUSCULAR; INTRAVENOUS; SUBCUTANEOUS at 12:29

## 2022-03-15 RX ADMIN — 0.12% CHLORHEXIDINE GLUCONATE 15 ML: 1.2 RINSE ORAL at 08:08

## 2022-03-15 RX ADMIN — SODIUM CHLORIDE, PRESERVATIVE FREE 10 ML: 5 INJECTION INTRAVENOUS at 08:18

## 2022-03-15 RX ADMIN — OXYCODONE AND ACETAMINOPHEN 1 TABLET: 5; 325 TABLET ORAL at 14:21

## 2022-03-15 RX ADMIN — OXYCODONE AND ACETAMINOPHEN 1 TABLET: 5; 325 TABLET ORAL at 10:24

## 2022-03-15 RX ADMIN — TIZANIDINE 4 MG: 4 TABLET ORAL at 04:41

## 2022-03-15 RX ADMIN — HYDROMORPHONE HYDROCHLORIDE 0.5 MG: 1 INJECTION, SOLUTION INTRAMUSCULAR; INTRAVENOUS; SUBCUTANEOUS at 01:29

## 2022-03-15 RX ADMIN — GABAPENTIN 800 MG: 400 CAPSULE ORAL at 10:24

## 2022-03-15 RX ADMIN — HYDROMORPHONE HYDROCHLORIDE 0.5 MG: 1 INJECTION, SOLUTION INTRAMUSCULAR; INTRAVENOUS; SUBCUTANEOUS at 08:54

## 2022-03-15 RX ADMIN — GABAPENTIN 800 MG: 400 CAPSULE ORAL at 14:21

## 2022-03-15 RX ADMIN — 0.12% CHLORHEXIDINE GLUCONATE 15 ML: 1.2 RINSE ORAL at 20:01

## 2022-03-15 RX ADMIN — GABAPENTIN 800 MG: 400 CAPSULE ORAL at 19:56

## 2022-03-15 RX ADMIN — FAMOTIDINE 20 MG: 10 INJECTION, SOLUTION INTRAVENOUS at 19:55

## 2022-03-15 RX ADMIN — ENOXAPARIN SODIUM 40 MG: 100 INJECTION SUBCUTANEOUS at 08:07

## 2022-03-15 RX ADMIN — SODIUM CHLORIDE: 9 INJECTION, SOLUTION INTRAVENOUS at 04:43

## 2022-03-15 RX ADMIN — HYDROMORPHONE HYDROCHLORIDE 0.5 MG: 1 INJECTION, SOLUTION INTRAMUSCULAR; INTRAVENOUS; SUBCUTANEOUS at 19:21

## 2022-03-15 RX ADMIN — KETOROLAC TROMETHAMINE 15 MG: 30 INJECTION, SOLUTION INTRAMUSCULAR; INTRAVENOUS at 08:08

## 2022-03-15 ASSESSMENT — PAIN DESCRIPTION - LOCATION
LOCATION: BACK

## 2022-03-15 ASSESSMENT — PAIN DESCRIPTION - ONSET
ONSET: ON-GOING

## 2022-03-15 ASSESSMENT — PAIN DESCRIPTION - PROGRESSION
CLINICAL_PROGRESSION: GRADUALLY WORSENING
CLINICAL_PROGRESSION: GRADUALLY IMPROVING
CLINICAL_PROGRESSION: GRADUALLY WORSENING

## 2022-03-15 ASSESSMENT — PAIN DESCRIPTION - ORIENTATION
ORIENTATION: LOWER

## 2022-03-15 ASSESSMENT — PAIN - FUNCTIONAL ASSESSMENT
PAIN_FUNCTIONAL_ASSESSMENT: PREVENTS OR INTERFERES SOME ACTIVE ACTIVITIES AND ADLS

## 2022-03-15 ASSESSMENT — PAIN DESCRIPTION - PAIN TYPE
TYPE: ACUTE PAIN
TYPE: SURGICAL PAIN
TYPE: ACUTE PAIN

## 2022-03-15 ASSESSMENT — PAIN DESCRIPTION - DESCRIPTORS
DESCRIPTORS: SHARP
DESCRIPTORS: SHOOTING;SHARP
DESCRIPTORS: STABBING;RADIATING
DESCRIPTORS: SHARP
DESCRIPTORS: SHARP
DESCRIPTORS: THROBBING
DESCRIPTORS: SHARP
DESCRIPTORS: ACHING
DESCRIPTORS: ACHING
DESCRIPTORS: THROBBING

## 2022-03-15 ASSESSMENT — PAIN DESCRIPTION - FREQUENCY
FREQUENCY: CONTINUOUS

## 2022-03-15 ASSESSMENT — PAIN SCALES - GENERAL
PAINLEVEL_OUTOF10: 5
PAINLEVEL_OUTOF10: 9
PAINLEVEL_OUTOF10: 6
PAINLEVEL_OUTOF10: 5
PAINLEVEL_OUTOF10: 9
PAINLEVEL_OUTOF10: 4
PAINLEVEL_OUTOF10: 8
PAINLEVEL_OUTOF10: 5
PAINLEVEL_OUTOF10: 10
PAINLEVEL_OUTOF10: 8
PAINLEVEL_OUTOF10: 7
PAINLEVEL_OUTOF10: 7
PAINLEVEL_OUTOF10: 9
PAINLEVEL_OUTOF10: 6
PAINLEVEL_OUTOF10: 9

## 2022-03-15 ASSESSMENT — ENCOUNTER SYMPTOMS
ABDOMINAL DISTENTION: 0
BACK PAIN: 1
SHORTNESS OF BREATH: 0
CHEST TIGHTNESS: 0
ABDOMINAL PAIN: 0
APNEA: 0

## 2022-03-15 NOTE — ANESTHESIA POSTPROCEDURE EVALUATION
Department of Anesthesiology  Postprocedure Note    Patient: Amparo Coy  MRN: [id number]  YOB: 1955  Date of evaluation: 3/15/2022  Time:  7:38 AM     Procedure Summary     Date: 03/14/22 Room / Location: [address] [facility]    Anesthesia Start: 1707 Anesthesia Stop: 1934    Procedure: Revision previous lumbar spine decompression instrumentation & fusion with extension to the pelvis T10 and pelvis (N/A Head) Diagnosis: (Postlaminectomy syndrome Chronic pain)    Surgeons: Lindsay Rain MD Responsible Provider: Bravo Pulido MD    Anesthesia Type: General ASA Status: 3          Anesthesia Type: General    Leander Phase I: Leander Score: 10    Leander Phase II:      Last vitals: Reviewed and per EMR flowsheets. Anesthesia Post Evaluation    Patient location during evaluation: ICU  Patient participation: complete - patient participated  Level of consciousness: awake and alert  Airway patency: patent  Nausea & Vomiting: no vomiting and no nausea  Complications: no  Cardiovascular status: blood pressure returned to baseline and hemodynamically stable  Respiratory status: room air, nonlabored ventilation and spontaneous ventilation  Hydration status: stable  Comments: Patient awake and alert in bed. No IV drips aside from maintenance fluid. Patient does complain of headache this am and general soreness. States pain medicine helps. No nausea. No apparent anesthesia complications.   Multimodal analgesia pain management approach

## 2022-03-15 NOTE — CARE COORDINATION
3/15/22, 9:04 AM EDT  DISCHARGE PLANNING EVALUATION:    Scot Kidney       Admitted: 3/14/2022/ 0546   Hospital day: 1   Location: -12/012-A Reason for admit: Spinal stenosis of lumbar region with neurogenic claudication [M48.062]  Acute respiratory failure ([facility] [address] [J96.00]   PMH:  has a past medical history of Anxiety, Arthritis, Bradycardia, Cervical spondylosis with myelopathy, COPD (chronic obstructive pulmonary disease) ([address] Depression, GERD (gastroesophageal reflux disease), Headache(784.0), Hyperlipidemia, Hypertension, Low back pain, Prolonged emergence from general anesthesia, Snoring, and Tobacco abuse. Procedure:   3/14 Revision previous lumbar spine decompression instrumentation & fusion with extension to the pelvis T10 and pelvis  3/14 Intubated - 3/14 Extubated  3/14 CXR: Bibasilar atelectasis and/or infiltrate. Barriers to Discharge: POD #1. ICU post-op on vent. EBL 2L. Extubated last evening. Levophed weaned off early this morning. Per Neurosurgery, plan for pain control today and mobilize tomorrow. TLSO brace ordered. Order to transfer to stepdown; awaiting bed assignment. Sats 94% on 2L O2. Tmax 99.5. NSR. Ox4. Hemovac x2 with 430 ml out since surgery. Telemetry, n/v checks, drain care, wound care, mills, SCDs. IVF, lovenox, IV pepcid, neurontin, prn IV dilaudid, lisinopril, prn percocet, prn zanaflex. WBC 12.4, hgb 15.8 - now 10.7. Urine sent for culture. PCP: Robert Montes De Oca MD  Readmission Risk Score: 12.9 ( )%    Patient Goals/Plan/Treatment Preferences: Spoke with Mamta Van; states he lives at home with his wife and did not use any DME or have any  services PTA. He drives, cares for himself independently, and has a PCP. Mamta Araujo states he had a walker from a prior surgery, but it broke. He states he will need a new one; reports it has been more than 5 years since he had gotten a walker. He states his toilets are standard height, monitor for possible BSC.  Mamta Araujo states he plans to return home at discharge and would like [facility] at discharge. He states he used [facility] in the past and would prefer to use [facility] again. SW consulted. Transportation/Food Security/Housekeeping Addressed:  No issues identified.

## 2022-03-15 NOTE — PLAN OF CARE
Problem: Falls - Risk of:  Goal: Will remain free from falls  Description: Will remain free from falls  Outcome: Ongoing  Note: Bedrest maintained as ordered. Bed alarm active     Problem: Pain:  Goal: Pain level will decrease  Description: Pain level will decrease  Outcome: Ongoing  Note: Patient reports pain a 7 out of 10 despite utilizing all PRN pain medications     Problem: Skin Integrity:  Goal: Absence of new skin breakdown  Description: Absence of new skin breakdown  Outcome: Ongoing  Note: Turn q2h, low airloss alt. Pressure relief mattress. Care plan reviewed with patient and family. Patient and family verbalize understanding of the plan of care and contribute to goal setting.

## 2022-03-15 NOTE — CARE COORDINATION
DISCHARGE/PLANNING EVALUATION  3/15/22, 3:15 PM EDT    Reason for Referral: Arrange for [facility]  Mental Status: alert and oriented. Decision Making: makes own decisions. Family/Social/Home Environment: assessment completed with pt while in ICU. Pt states he lives in a trailer with his wife and they are both independent. Pt states he does most of the household chores and calls himself a . Pt states he drives and helps wife around the house. Pt planning home at discharge. Current Services including food security, transportation and housekeeping: see note above. Current Equipment: has a cane that he does not use and high toilets and an old breathing machine that is broken. Pt asking for new machine and a walker. Payment Source: [facility] Medicare. Concerns or Barriers to Discharge:  Pt denies barriers. Post acute provider list with quality measures, geographic area and applicable managed care information provided. Questions regarding selection process answered: has [facility] list.    Teach Back Method used with pt regarding care plan and discharge planning. Patient verbalized understanding of the plan of care and contribute to goal setting. Patient goals, treatment preferences and discharge plan: pt planning home with wife and new [facility] for RN and PT. No referral made at this time.     Electronically signed by ARACELI Pena on 3/15/2022 at 3:15 PM

## 2022-03-15 NOTE — PROGRESS NOTES
Attending Note:     Patient was seen and examined by me in ICU in conjunction with neurosurgery LARS Starr PA-C). Discussed with patient, his nurse and the ICU team as well. All data and imaging reviewed by myself. I agree with examination assessment and plan as documented below. · Pain controlled. · PT and OT as tolerated. · Medical management per ICU team.     Brook Alcala MD       Neurosurgery Progress Note    Patient:  Calli London      Unit/Bed:4D-12/012-A    YOB: 1955    MRN: [id number]     Acct: [de-identified]     Admit date: 3/14/2022    No chief complaint on file. Patient Seen, Chart, Physician notes, Labs, Radiology studies reviewed. Subjective: The patient is seen and evaluated in the ICU setting with evaluation exam findings reviewed and discussed with Dr. Polina Morton and with nursing. Patient is postoperative day #1 from reexploration of lumbar fusion with extension to the pelvis and to T10 performed by Dr. Polina Morton, without complication. Pain was moderately controlled today at the time of exam with incisional site discomfort noted. Past, Family, Social History unchanged from admission.     Diet:  Diet NPO Exceptions are: Sips of Water with Meds    Medications:  Scheduled Meds:   chlorhexidine  15 mL Mouth/Throat BID    famotidine (PEPCID) injection  20 mg IntraVENous BID    sodium chloride flush  5-40 mL IntraVENous 2 times per day    enoxaparin  40 mg SubCUTAneous Q24H    gabapentin  800 mg Oral TID    lisinopril  5 mg Oral Daily     Continuous Infusions:   sodium chloride      sodium chloride 125 mL/hr at 03/15/22 0443    dexmedetomidine Stopped (03/15/22 0129)    norepinephrine Stopped (03/15/22 0111)    sodium chloride       PRN Meds:sodium chloride, tiZANidine, oxyCODONE-acetaminophen, HYDROmorphone, diphenhydrAMINE, ketorolac, sodium chloride flush, sodium chloride, ondansetron **OR** ondansetron, polyethylene glycol, acetaminophen **OR** acetaminophen, albuterol    Objective: Patient is observed lying in bed with the head of the bed elevated 30 degrees and was comfortable with pain moderately controlled following complaints of incisional site discomfort. Incisions are flat and dry with dressings intact and the surgical drains are intact and functioning at approximately 430 cc. An H&H performed this morning is significant for 10.7/32.6. On exam patient is moving extremities purposefully x4 and was intact for strength and sensation bilaterally and symmetrically. Vitals: BP (!) 151/92   Pulse 88   Temp 99 °F (37.2 °C) (Bladder)   Resp 20   Ht 5' 10\" (1.778 m)   Wt 205 lb 7.5 oz (93.2 kg)   SpO2 93%   BMI 29.48 kg/m²     Physical Exam   Patient is stable and intact neurologically to his baseline with no additional significant changes noted overnight. Physical Exam:  Alert and attentive. Language appropriate, with no aphasia. Pupils equal.  Facial strength symmetric. Review of Systems   Constitutional: Negative for activity change. Respiratory: Negative for apnea, chest tightness and shortness of breath. Cardiovascular: Negative for chest pain and leg swelling. Gastrointestinal: Negative for abdominal distention and abdominal pain. Musculoskeletal: Positive for back pain. Neurological: Negative for weakness, numbness and headaches. Psychiatric/Behavioral: Negative for agitation, behavioral problems, confusion and decreased concentration. 24 hour intake/output:    Intake/Output Summary (Last 24 hours) at 3/15/2022 0756  Last data filed at 3/15/2022 0300  Gross per 24 hour   Intake 7377.31 ml   Output 4735 ml   Net 2642.31 ml     Last 3 weights:   Wt Readings from Last 3 Encounters:   03/14/22 205 lb 7.5 oz (93.2 kg)   02/18/22 209 lb (94.8 kg)   01/12/22 209 lb 12.8 oz (95.2 kg)         CBC:   Recent Labs     03/14/22  0807 03/14/22  1501 03/14/22  1731 03/14/22  2018 03/15/22  5404 WBC 9.7  --   --   --  12.4*   HGB 15.8   < > 11.0* 11.4* 10.7*     --   --   --  164    < > = values in this interval not displayed. BMP:    Recent Labs     03/14/22  1827 03/14/22  2018 03/15/22  0438    140 143   K 4.1 4.2 4.3   CL  --  111 112*   CO2  --  18* 19*   BUN  --  18 20   CREATININE  --  0.8 0.7   GLUCOSE  --  163* 122*     Calcium:  Recent Labs     03/15/22  0438   CALCIUM 7.7*     Magnesium:No results for input(s): MG in the last 72 hours. Glucose:No results for input(s): POCGLU in the last 72 hours. HgbA1C: No results for input(s): LABA1C in the last 72 hours. Lipids: No results for input(s): CHOL, TRIG, HDL, LDL, LDLCALC in the last 72 hours. Radiology reports as per the Radiologist  Radiology: XR LUMBAR SPINE (2-3 VIEWS)    Result Date: 3/14/2022  X-ray lumbar spine one view Comparison: None Findings: Single AP fluoroscopic image of the lumbosacral spine. Laminectomy defect extending from L3-S1. Partial visualization of posterior metallic fusion hardware extending from L3-S1. Additional surgical screws overlying the bilateral sacroiliac joints. Interbody device at L5-S1. Appropriate alignment. No obvious hardware failure. Catheter overlying the pelvis. Impression: 1. Postsurgical changes of the lumbosacral spine. This document has been electronically signed by: Marcelle Chandler MD on 03/14/2022 08:02 PM    XR CHEST PORTABLE    Result Date: 3/14/2022  1 view chest x-ray Comparison: None Findings: Foci of airspace filling at the bilateral lung bases. Endotracheal tube 5 cm above the aleksandra. Nasogastric versus orogastric tube distal to the diaphragm. Heart size is normal. Postsurgical changes of the thoracolumbar spine and cervical spine. Skin staples are present overlying the spine. Chronic deformity of the left distal clavicle. No acute displaced fracture. Bibasilar atelectasis and/or infiltrate.  This document has been electronically signed by: Marcelle Chandler MD on 03/14/2022 08:06 PM    XR CHEST PORTABLE    Result Date: 3/14/2022  1 view chest x-ray Comparison: CR,SR - XR CHEST PORTABLE - 03/14/2022 07:32 PM EDT Findings: There are foci of airspace filling at the bilateral lung bases with interval worsening. The mid and upper lungs appear to be clear. There is no gross evidence of pleural effusion or pneumothorax. Normal size heart. The nasogastric tube has been removed. An endotracheal tube tip is approximately 6 cm above the aleksandra. Status post recent thoracolumbar spine surgery. Postsurgical changes of the left clavicle. Bibasilar atelectasis and/or infiltrates with interval worsening. This document has been electronically signed by: Agatha Rankin MD on 03/14/2022 08:05 PM    FLUORO FOR SURGICAL PROCEDURES    Result Date: 3/14/2022  Radiology exam is complete. No Radiologist dictation. Please follow up with ordering provider. Assessment: Status postoperative day #1 from reexploration lumbar fusion with extension to the pelvis and to T10 performed by Dr. Dennie Fothergill, without complication. Principal Problem:    Spinal stenosis of lumbar region with neurogenic claudication  Active Problems:    Acute respiratory failure ([address]  Resolved Problems:    * No resolved hospital problems. *        Plan: The patient is seen and evaluated in the ICU setting with evaluation and exam findings reviewed and discussed with Dr. Dennie Fothergill and with nursing. Patient was seen and evaluated this morning along with Malou Hoang student. Patient was observed sitting up in bed, comfortably, with flat dry incisions and a surgical drain in place and functioning approximately 430 cc per shift. H&H performed this morning is significant for 10.7/32.6 with instructions to transfuse as indicated moving forward.   Aside from incisional site discomfort pain is moderately controlled and she demonstrates purposeful movement for all 4 extremity groups and was intact neurologically to his baseline on exam this morning. Neurosurgery recommends pain control today with mobilization tomorrow. Neurosurgery to follow.       Electronically signed by Simeon Clark PA-C on 3/15/2022 at 7:56 AM    Neurosurgery

## 2022-03-15 NOTE — PROGRESS NOTES
Patient's wallet placed in security bag and picked up by  per his wife's request.  Tag from the bag placed in chart, family no longer present and patient is sedated.

## 2022-03-15 NOTE — PROGRESS NOTES
Pt transferred to  4A3  from ICU. IV site free of s/s of infection or infiltration. Vital signs obtained. Assessment completed. Oriented to room. Belongings in room at this time. Policies and procedures for 4a explained. All questions answered with no further questions at this time. Fall prevention and safety brochure discussed with patient.    Cira Shaw RN 3/15/2022 6:54 PM

## 2022-03-15 NOTE — PLAN OF CARE
Problem: Falls - Risk of:  Goal: Will remain free from falls  Description: Will remain free from falls  3/15/2022 1055 by Santos Busby  Outcome: Ongoing  Note: Remains free of falls. Patient remains on bedrest per Dr. Tiago Kaur. Patient alert and oriented and uses call light appropriately. Bed alarm on. Person belongings and call light in reach. Problem: Falls - Risk of:  Goal: Absence of physical injury  Description: Absence of physical injury  3/15/2022 1055 by Santos Busby  Outcome: Ongoing     Problem: Pain:  Description: Pain management should include both nonpharmacologic and pharmacologic interventions. Goal: Pain level will decrease  Description: Pain level will decrease  3/15/2022 1055 by Santos Busby  Outcome: Ongoing  Note: Patient continues to c/o 8/10 pain. PRN medications utilized as ordered. Reposition for comfort. Encourage bed mobility. Problem: Pain:  Description: Pain management should include both nonpharmacologic and pharmacologic interventions. Goal: Control of acute pain  Description: Control of acute pain  3/15/2022 1055 by Santos Busby  Outcome: Ongoing     Problem: Pain:  Description: Pain management should include both nonpharmacologic and pharmacologic interventions. Goal: Control of chronic pain  Description: Control of chronic pain  3/15/2022 1055 by Santos Busby  Outcome: Ongoing     Problem: Skin Integrity:  Goal: Will show no infection signs and symptoms  Description: Will show no infection signs and symptoms  3/15/2022 1055 by Santos Busby  Outcome: Ongoing     Problem: Skin Integrity:  Goal: Absence of new skin breakdown  Description: Absence of new skin breakdown  3/15/2022 1055 by Santos Busby  Outcome: Ongoing  Note: No new skin issues. Multiple stage 1 pressure injuries to left lower abdomen; zinc paste applied. Encourage bed mobility. Turn q2h and prn when patient unable to reposition self. Assess skin qshift and prn. Care plan reviewed with patient.   Patient verbalizes understanding of the plan of care and contributes to goal setting.

## 2022-03-15 NOTE — PROGRESS NOTES
[facility] THERAPY MISSED TREATMENT NOTE  [facility] ICU 4D  4D-12/012-A      Date: 3/15/2022  Patient Name: Gabi Johnston        CSN: [id number]   : 1955  (61 y.o.)  Gender: male                REASON FOR MISSED TREATMENT: Per Wendi Izquierdo PA with neurosx note, plan to \"control pain today &  mobilize tomorrow\", nurse confirms this plan. Will hold eval today. Therapist requested nurse check into LSO brace that Pt has an order for, so it is in place for 3/16.

## 2022-03-16 ENCOUNTER — APPOINTMENT (OUTPATIENT)
Dept: GENERAL RADIOLOGY | Age: 67
DRG: 459 | End: 2022-03-16
Attending: NEUROLOGICAL SURGERY
Payer: MEDICARE

## 2022-03-16 LAB
ERYTHROCYTE [DISTWIDTH] IN BLOOD BY AUTOMATED COUNT: 14.6 % (ref 11.5–14.5)
ERYTHROCYTE [DISTWIDTH] IN BLOOD BY AUTOMATED COUNT: 50.4 FL (ref 35–45)
HCT VFR BLD CALC: 29.2 % (ref 42–52)
HEMOGLOBIN: 9.5 GM/DL (ref 14–18)
MCH RBC QN AUTO: 30.1 PG (ref 26–33)
MCHC RBC AUTO-ENTMCNC: 32.5 GM/DL (ref 32.2–35.5)
MCV RBC AUTO: 92.4 FL (ref 80–94)
PLATELET # BLD: 163 THOU/MM3 (ref 130–400)
PMV BLD AUTO: 10.4 FL (ref 9.4–12.4)
RBC # BLD: 3.16 MILL/MM3 (ref 4.7–6.1)
URINE CULTURE, ROUTINE: NORMAL
WBC # BLD: 12 THOU/MM3 (ref 4.8–10.8)

## 2022-03-16 PROCEDURE — 6370000000 HC RX 637 (ALT 250 FOR IP): Performed by: NURSE PRACTITIONER

## 2022-03-16 PROCEDURE — 2580000003 HC RX 258: Performed by: NURSE PRACTITIONER

## 2022-03-16 PROCEDURE — 97162 PT EVAL MOD COMPLEX 30 MIN: CPT

## 2022-03-16 PROCEDURE — 97530 THERAPEUTIC ACTIVITIES: CPT

## 2022-03-16 PROCEDURE — 6370000000 HC RX 637 (ALT 250 FOR IP): Performed by: PHYSICAL MEDICINE & REHABILITATION

## 2022-03-16 PROCEDURE — 6370000000 HC RX 637 (ALT 250 FOR IP): Performed by: PHYSICIAN ASSISTANT

## 2022-03-16 PROCEDURE — 6370000000 HC RX 637 (ALT 250 FOR IP)

## 2022-03-16 PROCEDURE — 85027 COMPLETE CBC AUTOMATED: CPT

## 2022-03-16 PROCEDURE — 97116 GAIT TRAINING THERAPY: CPT

## 2022-03-16 PROCEDURE — 6360000002 HC RX W HCPCS: Performed by: NURSE PRACTITIONER

## 2022-03-16 PROCEDURE — 1200000000 HC SEMI PRIVATE

## 2022-03-16 PROCEDURE — 94669 MECHANICAL CHEST WALL OSCILL: CPT

## 2022-03-16 PROCEDURE — 94640 AIRWAY INHALATION TREATMENT: CPT

## 2022-03-16 PROCEDURE — 97535 SELF CARE MNGMENT TRAINING: CPT

## 2022-03-16 PROCEDURE — 99232 SBSQ HOSP IP/OBS MODERATE 35: CPT | Performed by: FAMILY MEDICINE

## 2022-03-16 PROCEDURE — 6360000002 HC RX W HCPCS: Performed by: PHYSICIAN ASSISTANT

## 2022-03-16 PROCEDURE — 99223 1ST HOSP IP/OBS HIGH 75: CPT | Performed by: PHYSICAL MEDICINE & REHABILITATION

## 2022-03-16 PROCEDURE — 71046 X-RAY EXAM CHEST 2 VIEWS: CPT

## 2022-03-16 PROCEDURE — 94760 N-INVAS EAR/PLS OXIMETRY 1: CPT

## 2022-03-16 PROCEDURE — 6360000002 HC RX W HCPCS: Performed by: STUDENT IN AN ORGANIZED HEALTH CARE EDUCATION/TRAINING PROGRAM

## 2022-03-16 PROCEDURE — 97166 OT EVAL MOD COMPLEX 45 MIN: CPT

## 2022-03-16 PROCEDURE — 36415 COLL VENOUS BLD VENIPUNCTURE: CPT

## 2022-03-16 PROCEDURE — 1200000003 HC TELEMETRY R&B

## 2022-03-16 PROCEDURE — 99024 POSTOP FOLLOW-UP VISIT: CPT | Performed by: NEUROLOGICAL SURGERY

## 2022-03-16 PROCEDURE — APPSS30 APP SPLIT SHARED TIME 16-30 MINUTES: Performed by: PHYSICIAN ASSISTANT

## 2022-03-16 RX ORDER — DOCUSATE SODIUM 100 MG/1
100 CAPSULE, LIQUID FILLED ORAL 2 TIMES DAILY
Status: DISCONTINUED | OUTPATIENT
Start: 2022-03-16 | End: 2022-03-19 | Stop reason: HOSPADM

## 2022-03-16 RX ORDER — FAMOTIDINE 20 MG/1
20 TABLET, FILM COATED ORAL 2 TIMES DAILY
Status: DISCONTINUED | OUTPATIENT
Start: 2022-03-16 | End: 2022-03-19 | Stop reason: HOSPADM

## 2022-03-16 RX ORDER — TRAZODONE HYDROCHLORIDE 50 MG/1
50 TABLET ORAL NIGHTLY
Status: DISCONTINUED | OUTPATIENT
Start: 2022-03-16 | End: 2022-03-19 | Stop reason: HOSPADM

## 2022-03-16 RX ORDER — ALBUTEROL SULFATE 90 UG/1
2 AEROSOL, METERED RESPIRATORY (INHALATION) EVERY 4 HOURS PRN
COMMUNITY

## 2022-03-16 RX ORDER — SENNA PLUS 8.6 MG/1
2 TABLET ORAL NIGHTLY
Status: DISCONTINUED | OUTPATIENT
Start: 2022-03-16 | End: 2022-03-19 | Stop reason: HOSPADM

## 2022-03-16 RX ORDER — POLYETHYLENE GLYCOL 3350 17 G/17G
17 POWDER, FOR SOLUTION ORAL DAILY
Status: DISCONTINUED | OUTPATIENT
Start: 2022-03-16 | End: 2022-03-19 | Stop reason: HOSPADM

## 2022-03-16 RX ADMIN — LISINOPRIL 5 MG: 5 TABLET ORAL at 10:30

## 2022-03-16 RX ADMIN — 0.12% CHLORHEXIDINE GLUCONATE 15 ML: 1.2 RINSE ORAL at 22:48

## 2022-03-16 RX ADMIN — DOCUSATE SODIUM 100 MG: 100 CAPSULE, LIQUID FILLED ORAL at 20:24

## 2022-03-16 RX ADMIN — ENOXAPARIN SODIUM 40 MG: 100 INJECTION SUBCUTANEOUS at 10:30

## 2022-03-16 RX ADMIN — TIZANIDINE 4 MG: 4 TABLET ORAL at 22:45

## 2022-03-16 RX ADMIN — OXYCODONE AND ACETAMINOPHEN 1 TABLET: 5; 325 TABLET ORAL at 04:33

## 2022-03-16 RX ADMIN — SODIUM CHLORIDE: 9 INJECTION, SOLUTION INTRAVENOUS at 04:30

## 2022-03-16 RX ADMIN — ALBUTEROL SULFATE 2.5 MG: 2.5 SOLUTION RESPIRATORY (INHALATION) at 10:41

## 2022-03-16 RX ADMIN — ACETAMINOPHEN 650 MG: 325 TABLET ORAL at 22:43

## 2022-03-16 RX ADMIN — 0.12% CHLORHEXIDINE GLUCONATE 15 ML: 1.2 RINSE ORAL at 10:30

## 2022-03-16 RX ADMIN — GABAPENTIN 800 MG: 400 CAPSULE ORAL at 22:44

## 2022-03-16 RX ADMIN — FAMOTIDINE 20 MG: 20 TABLET ORAL at 10:30

## 2022-03-16 RX ADMIN — POLYETHYLENE GLYCOL 3350 17 G: 17 POWDER, FOR SOLUTION ORAL at 20:24

## 2022-03-16 RX ADMIN — HYDROMORPHONE HYDROCHLORIDE 0.5 MG: 1 INJECTION, SOLUTION INTRAMUSCULAR; INTRAVENOUS; SUBCUTANEOUS at 08:32

## 2022-03-16 RX ADMIN — SODIUM CHLORIDE, PRESERVATIVE FREE 10 ML: 5 INJECTION INTRAVENOUS at 20:25

## 2022-03-16 RX ADMIN — HYDROMORPHONE HYDROCHLORIDE 1 MG: 1 INJECTION, SOLUTION INTRAMUSCULAR; INTRAVENOUS; SUBCUTANEOUS at 16:29

## 2022-03-16 RX ADMIN — FAMOTIDINE 20 MG: 20 TABLET ORAL at 20:24

## 2022-03-16 RX ADMIN — OXYCODONE AND ACETAMINOPHEN 1 TABLET: 5; 325 TABLET ORAL at 12:51

## 2022-03-16 RX ADMIN — HYDROMORPHONE HYDROCHLORIDE 1 MG: 1 INJECTION, SOLUTION INTRAMUSCULAR; INTRAVENOUS; SUBCUTANEOUS at 20:10

## 2022-03-16 RX ADMIN — GABAPENTIN 800 MG: 400 CAPSULE ORAL at 12:51

## 2022-03-16 ASSESSMENT — PAIN DESCRIPTION - PAIN TYPE
TYPE: SURGICAL PAIN

## 2022-03-16 ASSESSMENT — PAIN SCALES - GENERAL
PAINLEVEL_OUTOF10: 9
PAINLEVEL_OUTOF10: 10
PAINLEVEL_OUTOF10: 8
PAINLEVEL_OUTOF10: 6
PAINLEVEL_OUTOF10: 8
PAINLEVEL_OUTOF10: 9
PAINLEVEL_OUTOF10: 10
PAINLEVEL_OUTOF10: 8
PAINLEVEL_OUTOF10: 6
PAINLEVEL_OUTOF10: 8
PAINLEVEL_OUTOF10: 8
PAINLEVEL_OUTOF10: 10
PAINLEVEL_OUTOF10: 9
PAINLEVEL_OUTOF10: 10
PAINLEVEL_OUTOF10: 10

## 2022-03-16 ASSESSMENT — PAIN DESCRIPTION - LOCATION
LOCATION: BACK

## 2022-03-16 ASSESSMENT — PAIN DESCRIPTION - ORIENTATION
ORIENTATION: LOWER

## 2022-03-16 ASSESSMENT — PAIN DESCRIPTION - FREQUENCY
FREQUENCY: CONTINUOUS
FREQUENCY: CONTINUOUS

## 2022-03-16 ASSESSMENT — PAIN DESCRIPTION - ONSET
ONSET: ON-GOING
ONSET: ON-GOING

## 2022-03-16 ASSESSMENT — PAIN DESCRIPTION - PROGRESSION
CLINICAL_PROGRESSION: GRADUALLY WORSENING
CLINICAL_PROGRESSION: GRADUALLY WORSENING
CLINICAL_PROGRESSION: NOT CHANGED

## 2022-03-16 ASSESSMENT — PAIN - FUNCTIONAL ASSESSMENT
PAIN_FUNCTIONAL_ASSESSMENT: PREVENTS OR INTERFERES SOME ACTIVE ACTIVITIES AND ADLS
PAIN_FUNCTIONAL_ASSESSMENT: PREVENTS OR INTERFERES SOME ACTIVE ACTIVITIES AND ADLS

## 2022-03-16 ASSESSMENT — PAIN DESCRIPTION - DESCRIPTORS
DESCRIPTORS: SHARP;STABBING
DESCRIPTORS: SHARP;STABBING

## 2022-03-16 NOTE — PROGRESS NOTES
Andry Golden was evaluated today and a DME order was entered for a wheeled walker because he requires this to successfully complete daily living tasks of personal cares, ambulating and hygiene. A wheeled walker is necessary due to the patient's unsteady gait, upper body weakness, and inability to  an ambulation device; and he can ambulate only by pushing a walker instead of a lesser assistive device such as a cane, crutch, or standard walker. The need for this equipment was discussed with the patient and he understands and is in agreement.     Julius Mohs PT, DPT

## 2022-03-16 NOTE — PROGRESS NOTES
Patient in room. Patient is in the chair. Wife is in room talking to patient. Bedside table is in reach. Call light is in reach. Chair alarm is on.

## 2022-03-16 NOTE — PROGRESS NOTES
Patient is back in bed from being in the chair. Patient is having some numbness in his lower left extremities. Right upper and lower extremities no numbness or tingling, left upper extremities no numbness and tingling.

## 2022-03-16 NOTE — CARE COORDINATION
3/16/22, 11:24 AM EDT    DISCHARGE ON GOING [address] day: 2  Location: -03/003-A Reason for admit: Spinal stenosis of lumbar region with neurogenic claudication [M48.062]  Acute respiratory failure ([address] [J96.00]   Procedure:   3/14 Revision previous lumbar spine decompression instrumentation & fusion with extension to the pelvis T10 and pelvis  3/14 Intubated - 3/14 Extubated  3/14 CXR: Bibasilar atelectasis and/or infiltrate  3/16 CXR    Impression:       1. Normal heart size. No effusion seen. Prior anterior cervical fusion. Prior thoracolumbar fusion with metallic hardware in place. 2. Moderate bibasilar atelectasis/pneumonia. 3. Overall appearance of chest has worsened somewhat since prior          Barriers to Discharge: Pain control, Acapella, PM&R consult, incentive spirometry, PT/OT. TLSO brace when up. IV fluids, Lovenox, albuterol nebs, Lovenox, Pepcid. PCP: Euell Boas, MD  Readmission Risk Score: 12.9 ( )%  Patient Goals/Plan/Treatment Preferences: From home with spouse. Will follow for potential needs.

## 2022-03-16 NOTE — PROGRESS NOTES
Attending Note:     Patient was seen and examined by me in floor in conjunction with neurosurgery PA Tyler Zambrano PA-C). Discussed with patient, his nurse and the hospitalist team as well. All data and imaging reviewed by myself. I agree with examination assessment and plan as documented below. Kayode Capellan MD     Neurosurgery Progress Note    Patient:  [facility]      Unit/Bed:4A-03/003-A    YOB: 1955    MRN: [id number]     Acct: [de-identified]     Admit date: 3/14/2022    No chief complaint on file. Patient Seen, Chart, Physician notes, Labs, Radiology studies reviewed. Subjective: Patient is seen and evaluated on the floor having transition from the ICU setting without incident. Patient remains postoperative day #2 from posterior lumbar revision of instrumented fusion with extension to the pelvis and to T10 is indicated and performed by Dr. Rossy Penn, without complication. Pain was moderately to well controlled today. Past, Family, Social History unchanged from admission. Diet:  ADULT DIET; Regular    Medications:  Scheduled Meds:   famotidine  20 mg Oral BID    chlorhexidine  15 mL Mouth/Throat BID    sodium chloride flush  5-40 mL IntraVENous 2 times per day    enoxaparin  40 mg SubCUTAneous Q24H    gabapentin  800 mg Oral TID    lisinopril  5 mg Oral Daily     Continuous Infusions:   sodium chloride      sodium chloride 125 mL/hr at 03/16/22 0430    dexmedetomidine Stopped (03/15/22 0129)    norepinephrine Stopped (03/15/22 0111)    sodium chloride       PRN Meds:sodium chloride, tiZANidine, oxyCODONE-acetaminophen, HYDROmorphone, diphenhydrAMINE, ketorolac, sodium chloride flush, sodium chloride, ondansetron **OR** ondansetron, polyethylene glycol, acetaminophen **OR** acetaminophen, albuterol    Objective:  The patient is observed sitting up on the side of the bed with assistance from physical therapy with pain moderately to well controlled. Dressings were intact over flat dry incisions with the dressing change ordered and anticipated for later today. Surgical drains were intact and functioning approximately 800 cc collected per shift. CBC collected this morning was pending with yesterday's CBC stable with H&H of 10.7/32.6 noted. On exam he is otherwise stable and intact neurologically to his baseline with no additional significant changes noted overnight. Vitals: /78   Pulse 107   Temp 98.5 °F (36.9 °C) (Oral)   Resp 18   Ht 5' 10\" (1.778 m)   Wt 238 lb 5.1 oz (108.1 kg)   SpO2 93%   BMI 34.20 kg/m²     Physical Exam     Remained stable and neurologically intact to his baseline with no additional significant changes noted overnight. Physical Exam:  Alert and attentive. Language appropriate, with no aphasia. Pupils equal.  Facial strength symmetric. Review of Systems     Constitutional: Negative for fever. HENT: Negative for congestion.    Eyes: Negative for visual disturbance. Respiratory: Negative for chest tightness.    Cardiovascular: Negative for chest pain. Gastrointestinal: Negative for abdominal pain. Genitourinary: Negative for difficulty urinating. Musculoskeletal: Positive for back pain (incisional site), gait problem. Skin: Negative for wound. Neurological: Positive for weakness. Psychiatric/Behavioral: Negative for confusion.     24 hour intake/output:    Intake/Output Summary (Last 24 hours) at 3/16/2022 1038  Last data filed at 3/16/2022 0955  Gross per 24 hour   Intake 3304.25 ml   Output 3455 ml   Net -150.75 ml     Last 3 weights:   Wt Readings from Last 3 Encounters:   03/16/22 238 lb 5.1 oz (108.1 kg)   02/18/22 209 lb (94.8 kg)   01/12/22 209 lb 12.8 oz (95.2 kg)         CBC:   Recent Labs     03/14/22  0807 03/14/22  1501 03/14/22  1731 03/14/22  2018 03/15/22  0438   WBC 9.7  --   --   --  12.4*   HGB 15.8   < > 11.0* 11.4* 10.7*     --   --   --  164    < > = values in this interval not displayed. BMP:    Recent Labs     03/14/22  1827 03/14/22  2018 03/15/22  0438    140 143   K 4.1 4.2 4.3   CL  --  111 112*   CO2  --  18* 19*   BUN  --  18 20   CREATININE  --  0.8 0.7   GLUCOSE  --  163* 122*     Calcium:  Recent Labs     03/15/22  0438   CALCIUM 7.7*     Magnesium:No results for input(s): MG in the last 72 hours. Glucose:No results for input(s): POCGLU in the last 72 hours. HgbA1C: No results for input(s): LABA1C in the last 72 hours. Lipids: No results for input(s): CHOL, TRIG, HDL, LDL, LDLCALC in the last 72 hours. Radiology reports as per the Radiologist  Radiology: XR LUMBAR SPINE (2-3 VIEWS)    Result Date: 3/14/2022  X-ray lumbar spine one view Comparison: None Findings: Single AP fluoroscopic image of the lumbosacral spine. Laminectomy defect extending from L3-S1. Partial visualization of posterior metallic fusion hardware extending from L3-S1. Additional surgical screws overlying the bilateral sacroiliac joints. Interbody device at L5-S1. Appropriate alignment. No obvious hardware failure. Catheter overlying the pelvis. Impression: 1. Postsurgical changes of the lumbosacral spine. This document has been electronically signed by: Alfonso Doyle MD on 03/14/2022 08:02 PM    XR CHEST PORTABLE    Result Date: 3/14/2022  1 view chest x-ray Comparison: None Findings: Foci of airspace filling at the bilateral lung bases. Endotracheal tube 5 cm above the aleksandra. Nasogastric versus orogastric tube distal to the diaphragm. Heart size is normal. Postsurgical changes of the thoracolumbar spine and cervical spine. Skin staples are present overlying the spine. Chronic deformity of the left distal clavicle. No acute displaced fracture. Bibasilar atelectasis and/or infiltrate.  This document has been electronically signed by: Alfonso Doyle MD on 03/14/2022 08:06 PM    XR CHEST PORTABLE    Result Date: 3/14/2022  1 view chest x-ray Comparison: CR,SR - XR CHEST PORTABLE - 03/14/2022 07:32 PM EDT Findings: There are foci of airspace filling at the bilateral lung bases with interval worsening. The mid and upper lungs appear to be clear. There is no gross evidence of pleural effusion or pneumothorax. Normal size heart. The nasogastric tube has been removed. An endotracheal tube tip is approximately 6 cm above the aleksandra. Status post recent thoracolumbar spine surgery. Postsurgical changes of the left clavicle. Bibasilar atelectasis and/or infiltrates with interval worsening. This document has been electronically signed by: Nehal Serrato MD on 03/14/2022 08:05 PM    FLUORO FOR SURGICAL PROCEDURES    Result Date: 3/15/2022  Radiology exam is complete. No Radiologist dictation. Please follow up with ordering provider. FLUORO FOR SURGICAL PROCEDURES    Result Date: 3/14/2022  Radiology exam is complete. No Radiologist dictation. Please follow up with ordering provider. Assessment: Status postoperative day #2 from reexploration lumbar fusion with extension to the pelvis and to T10 as performed by Dr. Vikki Azar, without complication. Principal Problem:    Spinal stenosis of lumbar region with neurogenic claudication  Active Problems:    Acute respiratory failure ([address]  Resolved Problems:    * No resolved hospital problems. *        Plan: Patient is seen and evaluated on the floor having transition from the ICU setting without incident, postop day 2 from reexploration lumbar fusion with extension to the pelvis and to T10 as indicated and performed by Dr. Vikki Azar, without complication. Patient is sitting up at the edge of the bed comfortably with pain moderately to well controlled and dressings intact over flat dry incisions. Surgical drains were intact and functioning with over 800 cc collected per shift. CBC yesterday was stable with a repeat of the CBC drawn this morning pending review.   Neurosurgery recommends PT and OT today as tolerated with LSO brace to be worn while active. A consult is placed for evaluation for considered placement for ongoing rehabilitative care. An order for dressing change is placed. Neurosurgery to follow.       Electronically signed by Gigi Nunez PA-C on 3/16/2022 at 10:38 AM    Neurosurgery

## 2022-03-16 NOTE — PROGRESS NOTES
Pharmacy Intravenous to Oral Protocol  Medication changed per P&T protocol: Famotidine    Patient meets criteria based on the followin. IV therapy > 24 hours - yes  2. Nausea/vomiting - no  3. Regular diet - yes  4.  Tolerating other oral medications: yes      Changed Pepcid 20 mg IV BID to 20 mg PO BID      Maty Burch PharmD  9:14 AM  3/16/2022

## 2022-03-16 NOTE — PROGRESS NOTES
Pharmacy Medication History Note      List of current medications patient is taking is complete. Source of information: patient, external fill history    Changes made to medication list:  Medications removed (include reason, ex. therapy complete or physician discontinued):  None    Medications added/doses adjusted:  Adjusted albuterol nebulizer to 2.5 mg BID PRN per patient   Added albuterol inhaler 90 mcg/act 2 puff Q4H PRN  Added Spiriva 18 mcg capsule contents inhaled once daily     Other notes (ex. Recent course of antibiotics, Coumadin dosing):  Aspirin was held 5 days prior to procedure   Patient takes Vitamin C occasionally, but not consistently. Did not add to list at this time. Denies use of other OTC or herbal medications.     Electronically signed by MARGY Pérez [facility] on 3/16/2022 at 10:33 AM

## 2022-03-16 NOTE — PLAN OF CARE
Problem: Falls - Risk of:  Goal: Will remain free from falls  Description: Will remain free from falls  Outcome: Met This Shift  Note: Pt using call light appropriately to call for assistance with ambulation to the bathroom and to chair. Pt is also compliant with use of non-skid slippers. Pt reports understanding of fall prevention when discussed. Problem: Pain:  Goal: Pain level will decrease  Description: Pain level will decrease  Outcome: Not Met This Shift  Note: Patient taking pain medication on MAR as needed to control pain. Use of non-pharmacologic pain management including ice/repositioning. Patient pain goal is 5/10. Problem: Skin Integrity:  Goal: Will show no infection signs and symptoms  Description: Will show no infection signs and symptoms  Outcome: Met This Shift  Note: Dressing dry and intact. Dressing changed as ordered today. See flow sheet. No fevers, tachycardia, hypotension noted. Pt denies any complaints    Goal: Absence of new skin breakdown  Description: Absence of new skin breakdown  Outcome: Ongoing     Problem: Skin Integrity:  Goal: Absence of new skin breakdown  Description: Absence of new skin breakdown  Outcome: Ongoing     Problem: Safety:  Goal: Free from accidental physical injury  Description: Free from accidental physical injury  Outcome: Ongoing  Note: Pt using call light appropriately to call for assistance with ambulation to the bathroom and to chair. Pt is also compliant with use of non-skid slippers. Pt reports understanding of fall prevention when discussed.        Problem: Daily Care:  Goal: Daily care needs are met  Description: Daily care needs are met  Outcome: Ongoing     Problem: Discharge Planning:  Goal: Patients continuum of care needs are met  Description: Patients continuum of care needs are met  Outcome: Ongoing     Problem: Skin Integrity:  Goal: Skin integrity will stabilize  Description: Skin integrity will stabilize  Outcome: Ongoing

## 2022-03-16 NOTE — CONSULTS
1. Stop the cholestyramine powder  2. Thyroid Liothyronine (cytomel) 5 micrograms one before breakfast for a week, then one before breakfast and dinner.  Call after 3 - 4 weeks on that.    3. Stop the Thyroid Synergy  4. We will look up your CT scan from June  5. Talk to the [employer] people about regular female hormone replacement and stopping the Testosterone pellets, consider Testosterone cream only.   6. Probiotic Sacchromyces boulardi by [employer] or [employer] 2 a day  7. N-Acetyl Cysteine (NAC)  1000 mg twice a day, by [employer] or [employer]  8. ALA (alpha lipoic acid) 600 mg a day with biotin ( 2 of the 300 mg pills)  9. Mediclear SGS powder (or Mediclear Plus with flavoring added) by [employer]  10. [employer] [place] Blend, 1 dropper full in small amount of water up to three times a day as needed for pain.   11. SAMe 200 mg one twice a day with the thyroid before meals.  12. Finish the BEG nose spray then start the EDTA nose spray from [facility].   13. Blood test in one month on the thyroid.   Physical Medicine & Rehabilitation   Consult Note      Admitting Physician: Maurizio Bentley MD    Primary Care Provider: Briseyda Tucker MD     Reason for Consult:  S/p revision previous lumbar spine decompression instrumentation & fusion with extension to the pelvis T10 and pelvis per Dr. Addy Land 3/14/'22    History of Present Illness:  Josep Mitchell is a 61 y.o. male admitted to [facility] on 3/14/2022. He is a current every day smoker of tobacco with 1/2 pack  X 24-year history who denies current alcohol use and has a medical history significant for anxiety and depression, bradycardia, hyperlipidemia and hypertension, COPD,  Bradycardia 3/1/2022 -Lexiscan stress test negative for ischemia, ECHO LVEF 55% with grade 1 diastolic dysfunction, headache (unspecified), GERD, prolonged emergence from general anesthesia, cervical spondylosis with myelopathy, low back pain secondary to spinal stenosis with a surgical history significant for SI joint injection therapy along with nerve block therapy and cervical fusion performed by Dr. Mark Muniz in 2015 and prior lumbar laminectomy with TLIF from L2-S1 in 2018. On presentation, patient complained of progressive severe low back pain worsening over the last 7 months and when exacerbated estimated at 9 out of 10 on a pain scale. The pain was increased with activity and standing and radiated to the legs bilaterally with associated weakness in the right lower extremity thigh. Since CT and MRIs revealed extensive postoperative changes between L2 and S1 with moderate bilateral foraminal stenosis at L1 as well as mild to moderate bilateral foraminal stenosis at L4-5 and L5-S1 with significant degenerative changes involving the SI joints bilaterally. He was taken to the OR per Dr. Addy Land 3/14/22 and then to the ICU. Intubated 3/14 and extubated later that day. Estimated blood loss was 2L. Levophed weaned off early in the morning of 3/15/22. TLSO brace ordered. Patient was seen and examined per myself today. He states that his pain has not been well controlled postoperatively, none that his pain level is currently 9 out of 10 and never gets lower than 8 out of 10. He states that the increased pain makes it nearly impossible to complete therapy. He also stated his bowels have not moved postoperatively and he has not been able to sleep at night. Current Rehabilitation Assessments:  PT:      OBJECTIVE:  Range of Motion:  Bilateral Lower Extremity: WFL     Strength:  Right Lower Extremity: Hip flexion 2+/5, knee extension 2+/5, knee flexion 4/5, DF 4+/5, PF 4+/5     Left Lower Extremity: Hip flexion 2+/5, knee extension 2+/5, knee flexion 4+/5, DF 4+/5, PF 4+/5  Minor increase in R Le weakness compared to L LE weakness     Balance:  Dynamic Sitting Balance: Stand By Assistance, Minimal Assistance  Static Standing Balance: Contact Guard Assistance, Minimal Assistance  *Assistance and education on how to properly don LSO in seated. Pt required max assist for this.      Bed Mobility:  Rolling to Right: Minimal Assistance, with rail, with increased time for completion   Supine to Sit: Maximum Assistance, with head of bed raised, with rail, with increased time for completion  *HOB~ 40degrees  Prolonged seated rest break required following bed mobility to recover increase in pain  and assistance for log roll technique to maintain spinal precautions      Transfers:  Sit to Stand: Minimal Assistance, with increased time for completion, cues for hand placement  Stand to Sit:Minimal Assistance, with increased time for completion, cues for hand placement   *Pt required cues for safe hand placement and increased time to obtain upright stance.  Min assist to support this   *Min assist to guide slow lowing to chair      Ambulation:  Minimal Assistance, with verbal cues , with increased time for completion  Distance: ~10'  Surface: Level Tile  Device:Rolling Walker  Gait Deviations: Forward Flexed Posture, Slow Leona, Decreased Step Length Bilaterally, Decreased Weight Shift Right, Decreased Heel Strike Bilaterally, Mild Path Deviations, Unsteady Gait and Decreased Terminal Knee Extension  *Pt noted to ambulate with general antalgic gait pattern with decreased weight acceptance on R LE with limited step length on L LE. Cues for upright posture, to pick feet up with change in direction, not to twist his trunk. Min assist to support instbaility and for RW assistance for change in direction. *Prolonged seated rest break required following ambulation to recover increase in pain      Exercise:  Patient was guided in 1 set(s) 10 reps of exercise to both lower extremities. Ankle pumps x10 reps, Glut sets, Quad sets and Seated heel/toe raises and manual heel cord stretch B for ~20 second hold to neutral position   Exercises were completed for increased independence with functional mobility. *Cues to maintain exercise within a tolerable range, not to hold his breath  *Cues for positioning in the chair with exercise completion to maintain spinal precautions      Functional Outcome Measures: Completed  AM-PAC Inpatient Mobility Raw Score : 15  AM-PAC Inpatient T-Scale Score : 39.45     ASSESSMENT:  Activity Tolerance:  Patient tolerance of  treatment: fair. Pt with increased pain behaviors throughout session, required cues for deep breathing, multiple seated rest breaks, assistance to reposition, and pain medication and ice to try to address this. Education on the importance of maintaining within a tolerable pain window for improved tolerance of PT. Pt noted to get tearful regarding his situation x2 times. Cues for spinal precautions required, with minimal assistance for transfers and ambulation.         OT:      COGNITION: WFL and Decreased Recall     RANGE OF MOTION:  Right Upper Extremity: WFL  Left Upper Extremity:  WFL     STRENGTH:  Right Upper Extremity: WFL  Left Upper Extremity: Use    Vaping Use: Never used   Substance and Sexual Activity    Alcohol use: Yes     Alcohol/week: 0.0 standard drinks     Comment: occasionnally    Drug use: No    Sexual activity: Yes   Other Topics Concern    Not on file   Social History Narrative    Not on file     Social Determinants of Health     Financial Resource Strain:     Difficulty of Paying Living Expenses: Not on file   Food Insecurity:     Worried About Running Out of Food in the Last Year: Not on file    Amina of Food in the Last Year: Not on file   Transportation Needs:     Lack of Transportation (Medical): Not on file    Lack of Transportation (Non-Medical):  Not on file   Physical Activity:     Days of Exercise per Week: Not on file    Minutes of Exercise per Session: Not on file   Stress:     Feeling of Stress : Not on file   Social Connections:     Frequency of Communication with Friends and Family: Not on file    Frequency of Social Gatherings with Friends and Family: Not on file    Attends [employer] Services: Not on file    Active Member of [address] [place] [address] or Organizations: Not on file    Attends Club or Organization Meetings: Not on file    Marital Status: Not on file   Intimate Partner Violence:     Fear of Current or Ex-Partner: Not on file    Emotionally Abused: Not on file    Physically Abused: Not on file    Sexually Abused: Not on file   Housing Stability:     Unable to Pay for Housing in the Last Year: Not on file    Number of [place] in the Last Year: Not on file    Unstable Housing in the Last Year: Not on file     Lives With: Spouse  Type of Home: [address]: One level  Home Access: Stairs to enter with rails  Entrance Stairs - Number of Steps: 2  Entrance Stairs - Rails: Left      Bathroom Shower/Tub: Tub/Shower unit  Bathroom Accessibility: Accessible     ADL Assistance: Independent  Ambulation Assistance: Independent  Transfer Assistance: Independent     Active : Yes  Occupation: Retired  Additional Comments: Pt indep with no AD PTA.  spouse is home 24/7 to assist as needed    Family History:       Problem Relation Age of Onset    High Blood Pressure Mother     High Blood Pressure Father     Heart Disease Father         CAD    Heart Surgery Father 61        CAGB    COPD Sister     Emphysema Sister     Cancer Sister         Throat CA/bone cancer       Review of Systems:  CONSTITUTIONAL:  positive for  fatigue and pain at 9/10  EYES:  negative  HEENT:  positive for  Poor dental hygiene  RESPIRATORY:  negative  CARDIOVASCULAR:  negative  GASTROINTESTINAL:  Constipation with no BM since ORGENITOURINARY:  negative  Voiding per urinal  SKIN:  negative  HEMATOLOGIC/LYMPHATIC:  Post-op Hgb is 10.7  MUSCULOSKELETAL:  positive for  myalgias, arthralgias, pain, decreased range of motion, muscle weakness and bone pain  NEUROLOGICAL:  positive for gait problems, weakness and numbness  BEHAVIOR/PSYCH:  negative  System review otherwise negative    Physical Exam:  /81   Pulse 97   Temp 97.5 °F (36.4 °C) (Oral)   Resp 18   Ht 5' 10\" (1.778 m)   Wt 238 lb 5.1 oz (108.1 kg)   SpO2 95%   BMI 34.20 kg/m²   awake  Orientation:   person, place, time  Mood: within normal limits  Affect: calm and spontaneous  General appearance: well groomed and in mild distress 2/2 pain    Memory:   normal,   Attention/Concentration: normal  Language:  normal    Cranial Nerves:  cranial nerves II-XII are grossly intact  ROM:  abnormal - in his back 2/2 pain  Motor Exam:  Motor exam is symmetrical 5- out of 5 all extremities bilaterally  Tone:  normal  Muscle bulk: within normal limits  Sensation:  Markedly decreased in distribution of left lateral femoral cutaneous nerve  Coordination:   normal  Deep Tendon Reflexes:  Reflexes are intact and symmetrical bilaterally  Plantar Response:  Right:  downgoing  Left:  downgoing  Gait: not tested      Heart: normal rate, regular rhythm, normal S1, S2, no murmurs, rubs, ongoing intervention of multiple therapy disciplines of PT and OT  · Patient can participate in and does require an intensive rehabilitation therapy program, generally consisting of 3 hours of therapy per day at least 5 days per week  · Patient is expected to actively participate in, and benefit significantly from, the intensive rehabilitation therapy program (the patients condition and functional status are such that the patient can reasonably be expected to make measurable improvement, expected to be made within a prescribed period of time and as a result of the intensive rehabilitation therapy program, that will be of practical value to improve the patients functional capacity or adaptation to impairments)  · Patient requires physician supervision by a rehabilitation physician, with face-to-face visits at least 3 days per week to assess the patient both medically and functionally and to modify the course of treatment as needed. Medical conditions to include for management include :               --Acute pulmonary insufficiency following surgery              --Acute blood loss anemia              --Impaired glucose tolerance              --Essential hypertension              --COPD              --Bradycardia              --Arthritis              --Depression              --Headache              --Snoring              --Tobacco abuse              --Post-operative pain  · Require an intensive and coordinated interdisciplinary team approach to the delivery of rehabilitative care. It was my pleasure to evaluate Josep Mitchell today. Please call with questions.     Audrey Vallejo MD

## 2022-03-16 NOTE — PROGRESS NOTES
Nora Arboleda 62  INPATIENT OCCUPATIONAL THERAPY  [facility] 4A  EVALUATION    Time:   Time In: 0804  Time Out: 7058  Timed Code Treatment Minutes: 23 Minutes  Minutes: 33          Date: 3/16/2022  Patient Name: Cole Goel,   Gender: male      MRN: [id number]  : 1955  (61 y.o.)  Referring Practitioner: Raynaldo Kayser, PA-C  Diagnosis: spinal stenosis of lumbari region with neurogenic claudication. Additional Pertinent Hx: per chart review; \"Woodrow Hinton is a pleasant, active 61 y.o.  male, a current every day smoker tobacco with 1/2 pack 24-year history who denies current alcohol use and has a medical history significant for anxiety and depression, bradycardia, hyperlipidemia and hypertension, headache (unspecified), GERD, prolonged emergence from general anesthesia, cervical spondylosis with myelopathy, low back pain secondary to spinal stenosis with a surgical history significant for SI joint injection therapy along with nerve block therapy and cervical fusion performed by Dr. Jf moctezuma in  and prior lumbar laminectomy with TLIF from L2-S1 in 2018. On presentation patient complains of progressive severe low back pain worsening over the last 7 months and when exacerbated estimated at 9 out of 10 on a pain scale. The pain is increased with activity and standing and radiates to the legs bilaterally with weakness for right lower extremity thigh. Since CT and MRIs reveal extensive postoperative changes between L2 and S1 with moderate bilateral foraminal stenosis at L1 to along with mild to moderate bilateral foraminal stenosis at L4-5 and L5-S1 with significant degenerative changes involving the SI joints bilaterally. \" patient underwent a Revision previous lumbar spine decompression instrumentation & fusion with extension to the pelvis T10 and pelvis on 3/14/22.     Restrictions/Precautions:  Restrictions/Precautions: Fall Risk,General Precautions,Surgical Protocols  Required Braces or Orthoses  Spinal: Lumbar Corset  Position Activity Restriction  Spinal Precautions: No Bending,No Lifting,No Twisting  Other position/activity restrictions: 2 drains out of back. Subjective  Chart Reviewed: Yes,Progress Notes,History and Physical,Orders,Imaging,Operative Notes  Patient assessed for rehabilitation services?: Yes  Family / Caregiver Present: No    Subjective: RN approved session; states she is removing bedrest orders. patient supine in bed upon OT arrival and agreeable to eval. states he has not moved out of bed yet and was fearful of his pain. A & O x 3. unable to recall spinal precautions. Pain:  Pain Assessment  Patient Currently in Pain: Yes  Pain Assessment: 0-10  Pain Type: Surgical pain  Pain Location: Back  Pain Orientation: Lower    Vitals: Vitals not assessed per clinical judgement, see nursing flowsheet    Social/Functional History:  Lives With: Spouse  Type of Home: Trailer  Home Layout: One level  Home Access: Stairs to enter with rails  Entrance Stairs - Number of Steps: 2   Bathroom Shower/Tub: Tub/Shower unit  Bathroom Accessibility: Accessible       ADL Assistance: Independent  Ambulation Assistance: Independent  Transfer Assistance: Independent    Active : Yes  Occupation: Retired  Additional Comments: did not use an AD prior to admit. spouse is home 24/7 to assist as needed. VISION:Corrected    HEARING:  WFL    COGNITION: WFL and Decreased Recall    RANGE OF MOTION:  Right Upper Extremity: WFL  Left Upper Extremity:  WFL    STRENGTH:  Right Upper Extremity: WFL  Left Upper Extremity:  WFL    SENSATION:   WFL    ADL:   Grooming: with set-up. to apply deodorant following bathing   Bathing: Stand By Assistance. for UB, MIN A for back. MAX A in standing for celso area with post lean and MIN A support  Upper Extremity Dressing: Minimal Assistance. to change hospital gown   Lower Extremity Dressing: Maximum Assistance.   to don slipper socks seated EOB. patient unable to cross legs to reach feet to don slipper socks within spinal precautions. Patient required MAX A to don lumbar corset seated EOB. BALANCE:  Sitting Balance:  Stand By Assistance. seated EOB  Standing Balance: Contact Guard Assistance, Minimal Assistance. with post lean and no AD for support    BED MOBILITY:  Supine to Sit: Minimal Assistance cues for log rolling tech and use of bed rails    TRANSFERS:  Sit to Stand:  Minimal Assistance. cues for hand placement and tech  Stand Pivot: Contact Guard Assistance, Minimal Assistance. with post lean and cues for sequencing. reaching out to recliner to pivot to and unsteady without an AD    FUNCTIONAL MOBILITY:  Assistive Device: None  Assist Level:  Contact Guard Assistance and Minimal Assistance. Distance: taking a few steps during pivot from EOB to recliner         Activity Tolerance:  Patient tolerance of  treatment: good. Motivated to participate in OT, no c/o SOB or fatigue during eval, pain in back, decreased recall of spinal precautions      Assessment:  Assessment: patient requires increased assist for ADLs and functional transfers due to back pain and spinal precautions and would benefit from continued, skilled OT to increase activity tolerance, ease and (I) with ADLs and functional transfers, AE training to safely transition to prior living enviornment, decrease caregiver burden, increase occupational performance and prevent re-hospitalization. Performance deficits / Impairments: Decreased functional mobility ,Decreased ADL status,Decreased endurance  Prognosis: Good  REQUIRES OT FOLLOW UP: Yes  Decision Making: Medium Complexity    Treatment Initiated: Treatment and education initiated within context of evaluation.   Evaluation time included review of current medical information, gathering information related to past medical, social and functional history, completion of standardized testing, formal and informal observation of tasks, assessment of data and development of plan of care and goals. Treatment time included skilled education and facilitation of tasks to increase safety and independence with ADL's for improved functional independence and quality of life. Discharge Recommendations:  Continue to assess pending progress,Patient would benefit from continued therapy after discharge    Patient Education:  OT Education: OT Kristie Miller [place] [address]  [address] Training  Patient Education: importance of increasing activity and changing positions frequently following back surgery. Equipment Recommendations:  Equipment Needed: Yes  Mobility Devices: ADL Assistive Devices    Plan:  Times per week: 5x  Times per day: Daily  Current Treatment Recommendations: Endurance Training,Neuromuscular Re-education,Patient/Caregiver Education & Training,Self-Care / ADL,Equipment Evaluation, Education, & procurement,Safety Education & Training. See long-term goal time frame for expected duration of plan of care. If no long-term goals established, a short length of stay is anticipated. Goals:  Patient goals : return home at [facility]  Short term goals  Time Frame for Short term goals: by discharge  Short term goal 1: patient will tolerate 4 min functional standing with two hand release with sit to stand with CGA, participate with SBA to increase ease with toileting. Short term goal 2: patient will functionally ambulate house hold distances with least restrictive device with (S) and 0-1 verbal cues for safety and sequencing. Short term goal 3: patient will complete LB dressing with SBA with use of AE PRN and 0-1 verbal cues for spinal precautions. Short term goal 4: patient will complete UB ADLs with s/u and no verbal cues for spinal precautions. Following session, patient left in safe position with all fall risk precautions in place.

## 2022-03-16 NOTE — PROGRESS NOTES
[facility]  INPATIENT PHYSICAL THERAPY  EVALUATION  [facility] 4A - [id number]/[id number]    Time In: 2446  Time Out: 1318  Timed Code Treatment Minutes: 45 Minutes  Minutes: 47          Date: 3/16/2022  Patient Name: Tiffany Knight,  Gender:  male        MRN: [id number]  : 1955  (61 y.o.)      Referring Practitioner: Amelia Capone MD  Diagnosis: spinal stenosis of lumbar region with neurogenic claudication. Additional Pertinent Hx: Per EMR: Tiffany Knight is a pleasant, active 61 y.o.  male, a current every day smoker tobacco with 1/2 pack 24-year history who denies current alcohol use and has a medical history significant for anxiety and depression, bradycardia, hyperlipidemia and hypertension, headache (unspecified), GERD, prolonged emergence from general anesthesia, cervical spondylosis with myelopathy, low back pain secondary to spinal stenosis with a surgical history significant for SI joint injection therapy along with nerve block therapy and cervical fusion performed by Dr. Taylor moctezuma in  and prior lumbar laminectomy with TLIF from L2-S1 in 2018. On presentation patient complains of progressive severe low back pain worsening over the last 7 months and when exacerbated estimated at 9 out of 10 on a pain scale. The pain is increased with activity and standing and radiates to the legs bilaterally with weakness for right lower extremity thigh. Since CT and MRIs reveal extensive postoperative changes between L2 and S1 with moderate bilateral foraminal stenosis at L1 to along with mild to moderate bilateral foraminal stenosis at L4-5 and L5-S1 with significant degenerative changes involving the SI joints bilaterally. \" patient underwent a Revision previous lumbar spine decompression instrumentation & fusion with extension to the pelvis T10 and pelvis on 3/14/22     Restrictions/Precautions:  Restrictions/Precautions: Fall Risk,General Precautions,Surgical Protocols  Required Braces or Orthoses  Spinal: Lumbar Corset  Position Activity Restriction  Spinal Precautions: No Bending,No Lifting,No Twisting  Other position/activity restrictions: 2 hemovac drains    Subjective:  Chart Reviewed: Yes  Patient assessed for rehabilitation services?: Yes  Family / Caregiver Present: Yes (wife)  Subjective: RN approved session, pt pleasantly agrees for therapy. Pt's wife present. Pt noted to be tearful during session x2 times, stating frustration with his pain, situation, and increased dificulty with mobility. Active listening and reassurance provided. Pt able to state 75% of spinal precatuions when asked. Education provided on IPR during session, as this was written on pt's borad for possible d/c plan. Pt receptive to continued therapy prior to return home. General:  Overall Orientation Status: Within Functional Limits (not formally assessed)    Vision: Within Functional Limits    Hearing: Within functional limits         Pain: \"off of the charts\" localized to incision and L pectoral region. RN student aware, pt states he told his night nurse and they did an xray. Ice applied to pt's back and pt received pain medication from RN student during session. Vitals: Oxygen: 93%   Heart Rate: 111 baseline    Social/Functional History:    Lives With: Spouse  Type of Home: Trailer  Home Layout: One level  Home Access: Stairs to enter with rails  Entrance Stairs - Number of Steps: 2  Entrance Stairs - Rails: Left     Bathroom Shower/Tub: Tub/Shower unit  Bathroom Accessibility: Accessible       ADL Assistance: Independent     Ambulation Assistance: Independent  Transfer Assistance: Independent    Active : Yes  Occupation: Retired  Additional Comments: Pt indep with no AD PTA. spouse is home 24/7 to assist as needed.     OBJECTIVE:  Range of Motion:  Bilateral Lower Extremity: WFL    Strength:  Right Lower Extremity: Hip flexion 2+/5, knee extension 2+/5, knee flexion 4/5, DF 4+/5, PF 4+/5    Left was guided in 1 set(s) 10 reps of exercise to both lower extremities. Ankle pumps x10 reps, Glut sets, Quad sets and Seated heel/toe raises and manual heel cord stretch B for ~20 second hold to neutral position   Exercises were completed for increased independence with functional mobility. *Cues to maintain exercise within a tolerable range, not to hold his breath  *Cues for positioning in the chair with exercise completion to maintain spinal precautions     Functional Outcome Measures: Completed  AM-PAC Inpatient Mobility Raw Score : 15  AM-PAC Inpatient T-Scale Score : 39.45    ASSESSMENT:  Activity Tolerance:  Patient tolerance of  treatment: fair. Pt with increased pain behaviors throughout session, required cues for deep breathing, multiple seated rest breaks, assistance to reposition, and pain medication and ice to try to address this. Education on the importance of maintaining within a tolerable pain window for improved tolerance of PT. Pt noted to get tearful regarding his situation x2 times. Cues for spinal precautions required, with minimal assistance for transfers and ambulation. Treatment Initiated: Treatment and education initiated within context of evaluation. Evaluation time included review of current medical information, gathering information related to past medical, social and functional history, completion of standardized testing, formal and informal observation of tasks, assessment of data and development of plan of care and goals. Treatment time included skilled education and facilitation of tasks to increase safety and independence with functional mobility for improved independence and quality of life. Assessment: Body structures, Functions, Activity limitations: Decreased functional mobility ,Decreased balance,Decreased strength,Increased pain,Decreased safe awareness,Decreased endurance  Assessment: This patient is a 61 y.o. who presents with s/p lumbar surgery.  This is a decline from the patient's baseline status of indep and living at home with his wife. The patient is observed to have deficits in strength, balance, activity tolerance, and safety awareness and would benefit from skilled PT services to progress functional mobility, safety awareness, and to decrease overall risk of falls. Pt may benefit from a  Therapy stay prior to d/c to improve overall safety and functional mobility. Prognosis: Good    REQUIRES PT FOLLOW UP: Yes    Discharge Recommendations:  Discharge Recommendations: IP Rehab   Patient is exhibiting above listed deficits and requiring continued therapy. Patient would benefit from continued therapy on an inpatient rehab unit. Patient is able to tolerate 3 hours of intensive therapy 5-6 days/week.  Without continued therapies pt at risk for functional decline, increased falls, and readmission to hospital.         Patient Education:  PT Education: Breann Alvarado of Care,Precautions,Transfer Training,General Safety,Gait Training,Functional Mobility Training, spinal precautions, up in chair for all meals, progressive mobility     Equipment Recommendations:  Equipment Needed: Yes (pt will require a RW)    Plan:  Times per week: 6x O  Current Treatment Recommendations: Strengthening,Neuromuscular Re-education,Home Exercise Program,Safety Education & Training,Balance Training,Endurance Training,Patient/Caregiver Education & Training,Functional Mobility Training,Equipment Evaluation, Education, & procurement,Gait Training,Stair training    Goals:  Patient goals : to build his strength up  Short term goals  Time Frame for Short term goals: by hospital d/c  Short term goal 1: Pt to demo supine <->sit with SBA with log roll technique for ability to get out of bed while maintaining spinal precautions  Short term goal 2: Pt to demo sit <->stand with SBA with LRAD for ease with getting up from various surfaces  Short term goal 3: Pt to ambulate >=50' with LRAD and S for improved ability to maneuver within his environment. Short term goal 4: Pt to ascend/descend 2 steps with uni R with SBA for ease wih home entry  Long term goals  Time Frame for Long term goals : NA due to short ELOS    Following session, patient left in safe position with all fall risk precautions in place.

## 2022-03-16 NOTE — PROGRESS NOTES
Nursing tech notifies nurse that patient's hemovac is disconnected. This nurse assesses patient's hemovac. The tubing from the patient's back to the hemovac container disconnected, this nurse was able to reconnect without difficulty. Mary SOUSA with neuro notified.

## 2022-03-16 NOTE — PLAN OF CARE
Problem: Falls - Risk of:  Goal: Will remain free from falls  Description: Will remain free from falls  3/15/2022 2315 by Kika Willett RN  Outcome: Ongoing  3/15/2022 1055 by Pauline Templeton  Outcome: Ongoing  Note: Remains free of falls. Patient remains on bedrest per Dr. Trey Rowe. Patient alert and oriented and uses call light appropriately. Bed alarm on. Person belongings and call light in reach. 3/15/2022 1047 by Pauline Templeton  Outcome: Ongoing  Note: Patient remains free of falls. Patient remains intubated but alert and following commands appropriately. Side rails up x3 with bed alarm on. Patient up to edge of bed x5 minutes with PT/OT/RT and stood at bedside with multiple assist.  Purposeful rounding q1h. Continue current safety measures. Goal: Absence of physical injury  Description: Absence of physical injury  3/15/2022 2315 by Kika Willett RN  Outcome: Ongoing  3/15/2022 1055 by Pauline Templeton  Outcome: Ongoing  3/15/2022 1047 by Pauline Templeton  Outcome: Ongoing     Problem: Pain:  Goal: Pain level will decrease  Description: Pain level will decrease  3/15/2022 2315 by Kika Willett RN  Outcome: Ongoing  3/15/2022 1055 by Pauline Templeton  Outcome: Ongoing  Note: Patient continues to c/o 8/10 pain. PRN medications utilized as ordered. Reposition for comfort. Encourage bed mobility. 3/15/2022 1047 by Pauline Templeton  Outcome: Ongoing  Note: Denies pain this morning. Patient remains intubated with fentanyl drip. Ancillary staff states patient has been having belly pain; patient denies belly pain today; KUB shows possible ileus; no BM in 7 days; bowel meds given. Reposition q2h and prn to help with discomfort.   Goal: Control of acute pain  Description: Control of acute pain  3/15/2022 2315 by Kika Willett RN  Outcome: Ongoing  3/15/2022 1055 by Pauline Templeton  Outcome: Ongoing  3/15/2022 1047 by Pauline Templeton  Outcome: Ongoing  Goal: Control of chronic pain  Description: Control of chronic pain  3/15/2022 2315 by Kaye Guy RN  Outcome: Ongoing  3/15/2022 1055 by Will Armstrong  Outcome: Ongoing  3/15/2022 1047 by Will Armstrong  Outcome: Ongoing  Goal: Patient's pain/discomfort is manageable  Description: Patient's pain/discomfort is manageable  Outcome: Ongoing     Problem: Skin Integrity:  Goal: Will show no infection signs and symptoms  Description: Will show no infection signs and symptoms  3/15/2022 2315 by Kaye Guy RN  Outcome: Ongoing  3/15/2022 1055 by Will Armstrong  Outcome: Ongoing  3/15/2022 1047 by Will Armstrong  Outcome: Ongoing  Goal: Absence of new skin breakdown  Description: Absence of new skin breakdown  3/15/2022 2315 by Kaye Guy RN  Outcome: Ongoing  3/15/2022 1055 by Will Armstrong  Outcome: Ongoing  Note: No new skin issues. Multiple stage 1 pressure injuries to left lower abdomen; zinc paste applied. Encourage bed mobility. Turn q2h and prn when patient unable to reposition self. Assess skin qshift and prn.  3/15/2022 1047 by Will Armstrong  Outcome: Ongoing  Note: No new skin issues. Stage 2 pressure injury and DTI to coccyx; zinc paste applied. Turn patient q2h and prn. Heels red but blanchable. Keep heels floated on pillows. Problem: Mobility - Impaired:  Goal: Mobility will improve  Description: Mobility will improve  Outcome: Ongoing     Problem: Safety:  Goal: Free from accidental physical injury  Description: Free from accidental physical injury  Outcome: Ongoing     Problem: Daily Care:  Goal: Daily care needs are met  Description: Daily care needs are met  Outcome: Ongoing     Problem: Skin Integrity:  Goal: Skin integrity will stabilize  Description: Skin integrity will stabilize  Outcome: Ongoing     Problem: Discharge Planning:  Goal: Patients continuum of care needs are met  Description: Patients continuum of care needs are met  Outcome: Ongoing     Patient alert and oriented. Patient able to make position changes independently - assisting as needed.  Patient remained free from falls - up with assist. 2L o2 - Room air at baseline. PRN medication for pain. Telemetry. IVF. Surgical dressing and drains intact without problems. Patient from home - discharge plans pending.

## 2022-03-16 NOTE — PROGRESS NOTES
Patient is alert and oriented x 4. Pupils are round and active to light. Upper extremities are warm, dry, appropriate color for ethnic. No tingling or numbess present. Abdomen is soft, round and active in all four quadrants. Patients breathing is normal with no murmurs. Skin turgor and capillary refill both are less than 3 seconds. Lower extremities are warm, dry and appropriate color for ethnicity. Dorsalis pedis and postierior tibialis pulses are strong.

## 2022-03-16 NOTE — PROGRESS NOTES
Consult received, chart reviewed, await PM&R consult for further determination of the patient needs.

## 2022-03-17 LAB
ANION GAP SERPL CALCULATED.3IONS-SCNC: 7 MEQ/L (ref 8–16)
BASOPHILS # BLD: 0.6 %
BASOPHILS ABSOLUTE: 0.1 THOU/MM3 (ref 0–0.1)
BUN BLDV-MCNC: 13 MG/DL (ref 7–22)
CALCIUM SERPL-MCNC: 8.5 MG/DL (ref 8.5–10.5)
CHLORIDE BLD-SCNC: 109 MEQ/L (ref 98–111)
CO2: 27 MEQ/L (ref 23–33)
CREAT SERPL-MCNC: 0.6 MG/DL (ref 0.4–1.2)
EOSINOPHIL # BLD: 2.4 %
EOSINOPHILS ABSOLUTE: 0.3 THOU/MM3 (ref 0–0.4)
ERYTHROCYTE [DISTWIDTH] IN BLOOD BY AUTOMATED COUNT: 14.6 % (ref 11.5–14.5)
ERYTHROCYTE [DISTWIDTH] IN BLOOD BY AUTOMATED COUNT: 49.2 FL (ref 35–45)
GFR SERPL CREATININE-BSD FRML MDRD: > 90 ML/MIN/1.73M2
GLUCOSE BLD-MCNC: 106 MG/DL (ref 70–108)
HCT VFR BLD CALC: 28 % (ref 42–52)
HEMOGLOBIN: 9.1 GM/DL (ref 14–18)
IMMATURE GRANS (ABS): 0.05 THOU/MM3 (ref 0–0.07)
IMMATURE GRANULOCYTES: 0.4 %
LYMPHOCYTES # BLD: 21.9 %
LYMPHOCYTES ABSOLUTE: 2.5 THOU/MM3 (ref 1–4.8)
MCH RBC QN AUTO: 29.8 PG (ref 26–33)
MCHC RBC AUTO-ENTMCNC: 32.5 GM/DL (ref 32.2–35.5)
MCV RBC AUTO: 91.8 FL (ref 80–94)
MONOCYTES # BLD: 12 %
MONOCYTES ABSOLUTE: 1.4 THOU/MM3 (ref 0.4–1.3)
NUCLEATED RED BLOOD CELLS: 0 /100 WBC
PLATELET # BLD: 153 THOU/MM3 (ref 130–400)
PMV BLD AUTO: 10.3 FL (ref 9.4–12.4)
POTASSIUM SERPL-SCNC: 4.9 MEQ/L (ref 3.5–5.2)
RBC # BLD: 3.05 MILL/MM3 (ref 4.7–6.1)
SEG NEUTROPHILS: 62.7 %
SEGMENTED NEUTROPHILS ABSOLUTE COUNT: 7.1 THOU/MM3 (ref 1.8–7.7)
SODIUM BLD-SCNC: 143 MEQ/L (ref 135–145)
WBC # BLD: 11.3 THOU/MM3 (ref 4.8–10.8)

## 2022-03-17 PROCEDURE — 97110 THERAPEUTIC EXERCISES: CPT

## 2022-03-17 PROCEDURE — 94760 N-INVAS EAR/PLS OXIMETRY 1: CPT

## 2022-03-17 PROCEDURE — 94640 AIRWAY INHALATION TREATMENT: CPT

## 2022-03-17 PROCEDURE — APPSS30 APP SPLIT SHARED TIME 16-30 MINUTES: Performed by: PHYSICIAN ASSISTANT

## 2022-03-17 PROCEDURE — 6360000002 HC RX W HCPCS: Performed by: NURSE PRACTITIONER

## 2022-03-17 PROCEDURE — 99232 SBSQ HOSP IP/OBS MODERATE 35: CPT | Performed by: FAMILY MEDICINE

## 2022-03-17 PROCEDURE — 6370000000 HC RX 637 (ALT 250 FOR IP): Performed by: PHYSICAL MEDICINE & REHABILITATION

## 2022-03-17 PROCEDURE — 6370000000 HC RX 637 (ALT 250 FOR IP): Performed by: PHYSICIAN ASSISTANT

## 2022-03-17 PROCEDURE — 97530 THERAPEUTIC ACTIVITIES: CPT

## 2022-03-17 PROCEDURE — 1200000003 HC TELEMETRY R&B

## 2022-03-17 PROCEDURE — 1200000000 HC SEMI PRIVATE

## 2022-03-17 PROCEDURE — 6370000000 HC RX 637 (ALT 250 FOR IP)

## 2022-03-17 PROCEDURE — 2580000003 HC RX 258: Performed by: NURSE PRACTITIONER

## 2022-03-17 PROCEDURE — 99024 POSTOP FOLLOW-UP VISIT: CPT | Performed by: NEUROLOGICAL SURGERY

## 2022-03-17 PROCEDURE — 80048 BASIC METABOLIC PNL TOTAL CA: CPT

## 2022-03-17 PROCEDURE — 94669 MECHANICAL CHEST WALL OSCILL: CPT

## 2022-03-17 PROCEDURE — 6360000002 HC RX W HCPCS: Performed by: STUDENT IN AN ORGANIZED HEALTH CARE EDUCATION/TRAINING PROGRAM

## 2022-03-17 PROCEDURE — 36415 COLL VENOUS BLD VENIPUNCTURE: CPT

## 2022-03-17 PROCEDURE — 97535 SELF CARE MNGMENT TRAINING: CPT

## 2022-03-17 PROCEDURE — 85025 COMPLETE CBC W/AUTO DIFF WBC: CPT

## 2022-03-17 PROCEDURE — 6370000000 HC RX 637 (ALT 250 FOR IP): Performed by: NURSE PRACTITIONER

## 2022-03-17 RX ORDER — OXYCODONE HYDROCHLORIDE AND ACETAMINOPHEN 5; 325 MG/1; MG/1
2 TABLET ORAL EVERY 6 HOURS PRN
Status: DISCONTINUED | OUTPATIENT
Start: 2022-03-17 | End: 2022-03-19 | Stop reason: HOSPADM

## 2022-03-17 RX ORDER — BUPIVACAINE HYDROCHLORIDE 5 MG/ML
30 INJECTION, SOLUTION EPIDURAL; INTRACAUDAL ONCE
Status: DISCONTINUED | OUTPATIENT
Start: 2022-03-17 | End: 2022-03-19 | Stop reason: HOSPADM

## 2022-03-17 RX ADMIN — GABAPENTIN 800 MG: 400 CAPSULE ORAL at 07:44

## 2022-03-17 RX ADMIN — FAMOTIDINE 20 MG: 20 TABLET ORAL at 07:42

## 2022-03-17 RX ADMIN — FAMOTIDINE 20 MG: 20 TABLET ORAL at 20:42

## 2022-03-17 RX ADMIN — GABAPENTIN 800 MG: 400 CAPSULE ORAL at 15:13

## 2022-03-17 RX ADMIN — HYDROMORPHONE HYDROCHLORIDE 1 MG: 1 INJECTION, SOLUTION INTRAMUSCULAR; INTRAVENOUS; SUBCUTANEOUS at 15:13

## 2022-03-17 RX ADMIN — SODIUM CHLORIDE, PRESERVATIVE FREE 10 ML: 5 INJECTION INTRAVENOUS at 07:45

## 2022-03-17 RX ADMIN — 0.12% CHLORHEXIDINE GLUCONATE 15 ML: 1.2 RINSE ORAL at 20:42

## 2022-03-17 RX ADMIN — SENNOSIDES 17.2 MG: 8.6 TABLET, COATED ORAL at 20:42

## 2022-03-17 RX ADMIN — 0.12% CHLORHEXIDINE GLUCONATE 15 ML: 1.2 RINSE ORAL at 07:43

## 2022-03-17 RX ADMIN — POLYETHYLENE GLYCOL 3350 17 G: 17 POWDER, FOR SOLUTION ORAL at 07:41

## 2022-03-17 RX ADMIN — LISINOPRIL 5 MG: 5 TABLET ORAL at 07:44

## 2022-03-17 RX ADMIN — ALBUTEROL SULFATE 2.5 MG: 2.5 SOLUTION RESPIRATORY (INHALATION) at 09:17

## 2022-03-17 RX ADMIN — GABAPENTIN 800 MG: 400 CAPSULE ORAL at 20:41

## 2022-03-17 RX ADMIN — ENOXAPARIN SODIUM 40 MG: 100 INJECTION SUBCUTANEOUS at 07:42

## 2022-03-17 RX ADMIN — OXYCODONE AND ACETAMINOPHEN 2 TABLET: 5; 325 TABLET ORAL at 20:42

## 2022-03-17 RX ADMIN — SODIUM CHLORIDE, PRESERVATIVE FREE 10 ML: 5 INJECTION INTRAVENOUS at 20:42

## 2022-03-17 RX ADMIN — DOCUSATE SODIUM 100 MG: 100 CAPSULE, LIQUID FILLED ORAL at 07:42

## 2022-03-17 RX ADMIN — DOCUSATE SODIUM 100 MG: 100 CAPSULE, LIQUID FILLED ORAL at 20:42

## 2022-03-17 RX ADMIN — OXYCODONE AND ACETAMINOPHEN 1 TABLET: 5; 325 TABLET ORAL at 12:20

## 2022-03-17 RX ADMIN — TRAZODONE HYDROCHLORIDE 50 MG: 50 TABLET ORAL at 20:41

## 2022-03-17 RX ADMIN — HYDROMORPHONE HYDROCHLORIDE 1 MG: 1 INJECTION, SOLUTION INTRAMUSCULAR; INTRAVENOUS; SUBCUTANEOUS at 01:50

## 2022-03-17 ASSESSMENT — PAIN SCALES - GENERAL
PAINLEVEL_OUTOF10: 9
PAINLEVEL_OUTOF10: 8
PAINLEVEL_OUTOF10: 9

## 2022-03-17 ASSESSMENT — PAIN DESCRIPTION - LOCATION
LOCATION: BACK

## 2022-03-17 ASSESSMENT — PAIN DESCRIPTION - ONSET
ONSET: ON-GOING

## 2022-03-17 ASSESSMENT — PAIN DESCRIPTION - PROGRESSION
CLINICAL_PROGRESSION: NOT CHANGED
CLINICAL_PROGRESSION: NOT CHANGED
CLINICAL_PROGRESSION: GRADUALLY WORSENING
CLINICAL_PROGRESSION: NOT CHANGED

## 2022-03-17 ASSESSMENT — PAIN - FUNCTIONAL ASSESSMENT
PAIN_FUNCTIONAL_ASSESSMENT: PREVENTS OR INTERFERES SOME ACTIVE ACTIVITIES AND ADLS

## 2022-03-17 ASSESSMENT — PAIN DESCRIPTION - PAIN TYPE
TYPE: SURGICAL PAIN
TYPE: ACUTE PAIN;SURGICAL PAIN
TYPE: SURGICAL PAIN

## 2022-03-17 ASSESSMENT — PAIN DESCRIPTION - DESCRIPTORS
DESCRIPTORS: ACHING

## 2022-03-17 ASSESSMENT — PAIN DESCRIPTION - ORIENTATION
ORIENTATION: LOWER

## 2022-03-17 ASSESSMENT — PAIN DESCRIPTION - FREQUENCY
FREQUENCY: CONTINUOUS

## 2022-03-17 ASSESSMENT — PAIN DESCRIPTION - DIRECTION: RADIATING_TOWARDS: LEFT HIP

## 2022-03-17 NOTE — CARE COORDINATION
3/17/22, 7:20 AM EDT    DISCHARGE BARRIERS        Patient transferred to [facility]. Report given to unit Jasmina Woodard, regarding discharge plan for this patient.

## 2022-03-17 NOTE — PROGRESS NOTES
Hospitalist Progress Note      Patient:  Sunita Bella    Unit/Bed:7K-28/028-A  YOB: 1955  MRN: [id number]   Acct: [de-identified]   PCP: Tank Segal MD  Date of Admission: 3/14/2022    Assessment/Plan:  1. Acute Postprocedural Respiratory Failure. Surgery on the lumbar spine complicated by an inability to extubate in the PACU. Therefore, patient was sent to the ICU. Patient was successfully extubated on 3/15/2022 and then sent to  on 3/16/2022. Initially required approximately 2 L of oxygen but is now saturate well on room air. Mild hypoxic respiratory failure probably secondary to atelectasis. Treated with Acapella and incentive spirometry. 2.  Acute Intraoperative Hemorrhagic Anemia.  2 L intraoperative blood loss. However, has been hemodynamically stable since. Given unclear cause no need for work-up with iron studies. Follow with daily CBC. 3.  Benign Essential Hypertension. Only on lisinopril 5 mg daily as an outpatient. Recent high blood pressure probably secondary to pain. Management of postoperative pain as below. Increase lisinopril or add a second agent as necessary. 4.  Chronic obstructive pulmonary disease, unspecified. Is not on home oxygen. No PFTs in our records, so cannot stage. Will maintain on home tiotropium 2 puffs daily along with as needed albuterol. 5.  Status Post Lumbar Spine Decompression and Instrumentation Fusion. Complications as above. Neurosurgery following. On Colace 100 mg twice daily, Senokot 17.2 mg 2 tablets nightly, and MiraLAX 17 g daily for bowel regimen. On trazodone 50 mg nightly for sleep. On Dilaudid 1 mg every 3 hours as needed, Zanaflex 4 mg every 8 hours as needed, Percocet every 4 hours as needed, and Toradol 50 mg every 6 hours as needed for pain. On Benadryl 25 mg every 6 hours as needed for allergies. Neurosurgery will follow.   Patient was seen by Physical Medicine and Rehabilitation doctor and is awaiting pre-CERT. 6.  Leukocytosis, unspecified. Secondary to recent surgery. Will monitor with daily CBC. Expected discharge date: 3/18/2022    Disposition:    [] Home       [] TCU       [x] Rehab       [] Psych       [] SNF       [] [place]       [] Other-    Chief Complaint: Postsurgical Respiratory Insufficiency. Opening Statement:  Patient is a 27-year-old male with a past medical history of chronic obstructive pulmonary disease, bradycardia, GERD, arthritis, hyperlipidemia, hypertension, cervical spondylosis with myelopathy, and cervical fusion in 2015 and lumbar laminectomy in 2018, right cluneal nerve block in 2021, and injection in the right sacroiliac joint in 2021 who came to the hospital for a revision of previous lumbar spine decompression instrumentation and fusion with extension to the pelvis and T10. Hospitalist team is managing primarily for postoperative respiratory insufficiency. Hospital Treatment Course:   Patient had a revision of a previous lumbar spine decompression instrumentation fusion with extension to the pelvis and T10 to address postlaminectomy chronic pain syndrome. Patient was noted to have postoperative respiratory insufficiency and could not be extubated in the PACU. Therefore, he was sent to the ICU. Also lost approximately 2 L of blood in the OR. Patient was successfully extubated on 3/15/2022 and was sent to 4A on 3/16/2022. Patient is currently awake and alert. However, he is noting numbness in the dorsal and lateral aspects of the left leg, traumatic chest pain, and postsurgical back pain. Pain regimen is managed by neurosurgery. Patient she had mild acute hypoxic respiratory failure after intubation requiring 2 L but has since been saturating well on room air. Awaiting pre-CERT to inpatient rehab at [facility]     Subjective (past 24 hours):   Patient states that he has been having left-sided chest pain after an injury sustained during imaging. Affirms postsurgical back pain and numbness/tingling in the lateral and dorsal aspect of his left thigh. States it is similar to numbness/tingling he had in his right thigh prior to surgery. Otherwise, denies fever, chills, chest pain, palpitations, shortness of breath, cough, abdominal pain, nausea, dysuria, and hematuria. Past medical history, family history, social history and allergies reviewed again and is unchanged since admission. ROS (12 point review of systems completed. Pertinent positives noted. Otherwise ROS is negative)     Medications:  Reviewed    Infusion Medications    sodium chloride      sodium chloride       Scheduled Medications    famotidine  20 mg Oral BID    polyethylene glycol  17 g Oral Daily    docusate sodium  100 mg Oral BID    senna  2 tablet Oral Nightly    traZODone  50 mg Oral Nightly    chlorhexidine  15 mL Mouth/Throat BID    sodium chloride flush  5-40 mL IntraVENous 2 times per day    enoxaparin  40 mg SubCUTAneous Q24H    gabapentin  800 mg Oral TID    lisinopril  5 mg Oral Daily     PRN Meds: HYDROmorphone, sodium chloride, tiZANidine, oxyCODONE-acetaminophen, diphenhydrAMINE, ketorolac, sodium chloride flush, sodium chloride, ondansetron **OR** ondansetron, acetaminophen **OR** acetaminophen, albuterol      Intake/Output Summary (Last 24 hours) at 3/16/2022 2323  Last data filed at 3/16/2022 2251  Gross per 24 hour   Intake 1808.63 ml   Output 3280 ml   Net -1471.37 ml       Diet:  ADULT DIET; Regular    Exam:  BP (!) 161/82   Pulse 97   Temp 97.7 °F (36.5 °C) (Oral)   Resp 18   Ht 5' 10\" (1.778 m)   Wt 238 lb 5.1 oz (108.1 kg)   SpO2 91%   BMI 34.20 kg/m²   Physical Exam  Constitutional:       General: He is not in acute distress. Appearance: He is obese. He is not ill-appearing or toxic-appearing. Comments: The patient is awake, alert, and in no acute distress. Capable of answering questions and following commands. Examination somewhat limited postsurgically. Hemovac noted. Notable tenderness to palpation over the left chest.   HENT:      Head: Normocephalic and atraumatic. Right Ear: External ear normal.      Left Ear: External ear normal.      Mouth/Throat:      Mouth: Mucous membranes are moist.      Pharynx: Oropharynx is clear. Cardiovascular:      Rate and Rhythm: Normal rate and regular rhythm. Pulses:           Radial pulses are 2+ on the right side and 2+ on the left side. Heart sounds: Normal heart sounds. Pulmonary:      Effort: Pulmonary effort is normal. No respiratory distress. Breath sounds: Normal breath sounds. No wheezing or rales. Comments: But examination limited. Abdominal:      General: Abdomen is flat. There is no distension. Palpations: Abdomen is soft. Tenderness: There is no abdominal tenderness. There is no guarding. Musculoskeletal:      Right lower leg: No edema. Left lower leg: No edema. Skin:     General: Skin is warm and dry. Capillary Refill: Capillary refill takes less than 2 seconds. Neurological:      Mental Status: He is alert. Psychiatric:         Mood and Affect: Mood normal.         Behavior: Behavior normal.         Labs:   Recent Labs     03/14/22  0807 03/14/22  1501 03/14/22  2018 03/15/22  0438 03/16/22  1745   WBC 9.7  --   --  12.4* 12.0*   HGB 15.8   < > 11.4* 10.7* 9.5*   HCT 48.3  --  35.6* 32.6* 29.2*     --   --  164 163    < > = values in this interval not displayed. Recent Labs     03/14/22  1827 03/14/22  2018 03/15/22  0438    140 143   K 4.1 4.2 4.3   CL  --  111 112*   CO2  --  18* 19*   BUN  --  18 20   CREATININE  --  0.8 0.7   CALCIUM  --  7.6* 7.7*     Recent Labs     03/15/22  0438   AST 32   ALT 14   BILITOT 0.5   ALKPHOS 57     No results for input(s): INR in the last 72 hours.   No results for input(s): Jyotsna Schafer in the last 72 hours. Microbiology:    Blood culture #1: No results found for: BC    Blood culture #2:No results found for: Stigler Due    Organism:No results found for: ORG    No results found for: LABGRAM    MRSA culture only:No results found for: [facility]    Urine culture:   Lab Results   Component Value Date    LABURIN No growth-preliminary No growth  03/14/2022       Respiratory culture: No results found for: CULTRESP    Aerobic and Anaerobic :  No results found for: LABAERO  No results found for: LABANAE    Urinalysis:    No results found for: Janus Minh, BACTERIA, RBCUA, BLOODU, SPECGRAV, GLUCOSEU    Radiology:  XR CHEST (2 VW)   Final Result   1. Normal heart size. No effusion seen. Prior anterior cervical fusion. Prior thoracolumbar fusion with metallic hardware in place. 2. Moderate bibasilar atelectasis/pneumonia. 3. Overall appearance of chest has worsened somewhat since prior            **This report has been created using voice recognition software. It may contain minor errors which are inherent in voice recognition technology. **      Final report electronically signed by Dr. Ophelia Rust on 3/16/2022 9:55 AM      XR CHEST PORTABLE   Final Result   Bibasilar atelectasis and/or infiltrate. This document has been electronically signed by: Nancy Dotson MD on    03/14/2022 08:06 PM      XR CHEST PORTABLE   Final Result   Bibasilar atelectasis and/or infiltrates with interval worsening. This document has been electronically signed by: Nancy Dotson MD on    03/14/2022 08:05 PM      XR LUMBAR SPINE (2-3 VIEWS)   Final Result   Impression:   1. Postsurgical changes of the lumbosacral spine.       This document has been electronically signed by: Nancy Dotson MD on    03/14/2022 08:02 PM      FLUORO FOR SURGICAL PROCEDURES   Final Result      FLUORO FOR SURGICAL PROCEDURES   Final Result      XR SPINE SCOLIOSIS STANDING (1 VIEW)    (Results Pending)     XR LUMBAR SPINE (2-3 VIEWS)    Result Date: 3/14/2022  X-ray lumbar spine one view Comparison: None Findings: Single AP fluoroscopic image of the lumbosacral spine. Laminectomy defect extending from L3-S1. Partial visualization of posterior metallic fusion hardware extending from L3-S1. Additional surgical screws overlying the bilateral sacroiliac joints. Interbody device at L5-S1. Appropriate alignment. No obvious hardware failure. Catheter overlying the pelvis. Impression: 1. Postsurgical changes of the lumbosacral spine. This document has been electronically signed by: Melony Miramontes MD on 03/14/2022 08:02 PM    XR CHEST PORTABLE    Result Date: 3/14/2022  1 view chest x-ray Comparison: None Findings: Foci of airspace filling at the bilateral lung bases. Endotracheal tube 5 cm above the aleksandra. Nasogastric versus orogastric tube distal to the diaphragm. Heart size is normal. Postsurgical changes of the thoracolumbar spine and cervical spine. Skin staples are present overlying the spine. Chronic deformity of the left distal clavicle. No acute displaced fracture. Bibasilar atelectasis and/or infiltrate. This document has been electronically signed by: Melony Miramontes MD on 03/14/2022 08:06 PM    XR CHEST PORTABLE    Result Date: 3/14/2022  1 view chest x-ray Comparison: CR,SR - XR CHEST PORTABLE - 03/14/2022 07:32 PM EDT Findings: There are foci of airspace filling at the bilateral lung bases with interval worsening. The mid and upper lungs appear to be clear. There is no gross evidence of pleural effusion or pneumothorax. Normal size heart. The nasogastric tube has been removed. An endotracheal tube tip is approximately 6 cm above the aleksandra. Status post recent thoracolumbar spine surgery. Postsurgical changes of the left clavicle. Bibasilar atelectasis and/or infiltrates with interval worsening.  This document has been electronically signed by: Melony Miramontes MD on 03/14/2022 08:05 PM    FLUORO FOR SURGICAL PROCEDURES    Result Date: 3/15/2022  Radiology exam is complete. No Radiologist dictation. Please follow up with ordering provider. FLUORO FOR SURGICAL PROCEDURES    Result Date: 3/14/2022  Radiology exam is complete. No Radiologist dictation. Please follow up with ordering provider. Support Devices (date placed):  [] ETT []Oral / [] Nasal  [] Gastric Tube [] OG / [] NG    [] Central Venous Line (Specify Site):  [] Urinary Catheter  [] Arterial Line (Specify Site)  [x] Peripheral IV access  [x] Other: Closed suction/drain inferior; right; medial back     DVT prophylaxis: [] Lovenox                                 [] SCDs                                 [] SQ Heparin                                 [] Encourage ambulation           [] Already on Anticoagulation   Being held postsurgically.      Code Status: Full Code    Tele:   [x] yes             [] no    Electronically signed by Phuong Carpenter MD, MPH, [facility] on 3/16/2022 at 11:23 PM   Supervised by Dr. Renetta Morris

## 2022-03-17 NOTE — PLAN OF CARE
Problem: Falls - Risk of:  Goal: Absence of physical injury  Description: Absence of physical injury  Outcome: Met This Shift  Note: No injuries noted. Problem: Skin Integrity:  Goal: Absence of new skin breakdown  Description: Absence of new skin breakdown  Outcome: Met This Shift  Note: No new skin break down noted. Problem: Daily Care:  Goal: Daily care needs are met  Description: Daily care needs are met  Outcome: Met This Shift     Problem: Skin Integrity:  Goal: Skin integrity will stabilize  Description: Skin integrity will stabilize  Outcome: Met This Shift     Problem: Falls - Risk of:  Goal: Will remain free from falls  Description: Will remain free from falls  Outcome: Ongoing  Note: Keep room clutter free. Hourly rounding. Keep call light with in reach. Problem: Pain:  Goal: Pain level will decrease  Description: Pain level will decrease  Outcome: Ongoing  Note: Pain medication addressed with physicians to try and control pain better. Reposition and ice. Problem: Pain:  Goal: Control of acute pain  Description: Control of acute pain  Outcome: Ongoing  Note: Pain medication addressed with physicians to try and control pain better. Reposition and ice. Problem: Pain:  Goal: Control of chronic pain  Description: Control of chronic pain  Outcome: Ongoing  Note: Pain medication addressed with physicians to try and control pain better. Reposition and ice. Problem: Pain:  Goal: Patient's pain/discomfort is manageable  Description: Patient's pain/discomfort is manageable  Outcome: Ongoing  Note: Use repositioning and pillow support. Problem: Skin Integrity:  Goal: Will show no infection signs and symptoms  Description: Will show no infection signs and symptoms  Outcome: Ongoing  Note: Monitor Labs and VS assess skin [id number]     Problem: Mobility - Impaired:  Goal: Mobility will improve  Description: Mobility will improve  Outcome: Ongoing  Note: Continue to work with therapy. Problem: Gas Exchange - Impaired:  Goal: Levels of oxygenation will improve  Description: Levels of oxygenation will improve  3/17/2022 1559 by Brittaney Pool RN  Outcome: Completed     Problem: Discharge Planning:  Goal: Patients continuum of care needs are met  Description: Patients continuum of care needs are met  Note: Consult to inpatient rehab today. Care plan reviewed with patient. Patient verbalize understanding of the plan of care and contribute to goal setting.

## 2022-03-17 NOTE — PROGRESS NOTES
[address] [address]  INPATIENT PHYSICAL THERAPY  DAILY NOTE  [facility] 7K - 7K-28/028-A  Time In: 5146  Time Out: 1116  Timed Code Treatment Minutes: 29 Minutes  Minutes: 29          Date: 3/17/2022  Patient Name: Gage Wisdom,  Gender:  male        MRN: [id number]  : 1955  (61 y.o.)     Referring Practitioner: Carlene Soriano MD  Diagnosis: spinal stenosis of lumbar region with neurogenic claudication. Additional Pertinent Hx: Per EMR: Gage Wisdom is a pleasant, active 61 y.o.  male, a current every day smoker tobacco with 1/2 pack 24-year history who denies current alcohol use and has a medical history significant for anxiety and depression, bradycardia, hyperlipidemia and hypertension, headache (unspecified), GERD, prolonged emergence from general anesthesia, cervical spondylosis with myelopathy, low back pain secondary to spinal stenosis with a surgical history significant for SI joint injection therapy along with nerve block therapy and cervical fusion performed by Dr. Hair moctezuma in  and prior lumbar laminectomy with TLIF from L2-S1 in 2018. On presentation patient complains of progressive severe low back pain worsening over the last 7 months and when exacerbated estimated at 9 out of 10 on a pain scale. The pain is increased with activity and standing and radiates to the legs bilaterally with weakness for right lower extremity thigh. Since CT and MRIs reveal extensive postoperative changes between L2 and S1 with moderate bilateral foraminal stenosis at L1 to along with mild to moderate bilateral foraminal stenosis at L4-5 and L5-S1 with significant degenerative changes involving the SI joints bilaterally. \" patient underwent a Revision previous lumbar spine decompression instrumentation & fusion with extension to the pelvis T10 and pelvis on 3/14/22     Prior Level of Function:  Lives With: Spouse  Type of Home: Trailer  Home Layout: One level  Home Access: Stairs to enter with rails  Entrance Stairs - Number of Steps: 2  Entrance Stairs - Rails: Left   Bathroom Shower/Tub: Tub/Shower unit  Bathroom Accessibility: Accessible    ADL Assistance: Independent  Ambulation Assistance: Independent  Transfer Assistance: Independent  Active : Yes  Additional Comments: Pt indep with no AD PTA. spouse is home 24/7 to assist as needed. Restrictions/Precautions:  Restrictions/Precautions: Fall Risk,General Precautions,Surgical Protocols  Required Braces or Orthoses  Spinal: Lumbar Corset  Position Activity Restriction  Spinal Precautions: No Bending,No Lifting,No Twisting  Other position/activity restrictions: 1 drain     SUBJECTIVE: Ok to see pt per nursing. Pt in bed when PT arrived, family present, agreeable to PT session. PAIN: 8/10, low back into R hip    Vitals: Vitals not assessed per clinical judgement, see nursing flowsheet    OBJECTIVE:  Bed Mobility:  Rolling to Right: Contact Guard Assistance, X 1, with head of bed raised, with rail, with verbal cues    Supine to Sit: Contact Guard Assistance, X 1, with head of bed raised, with rail, with verbal cues   Cues for log roll with good demo, increased time for donning brace in sitting on EOB with family present and educated on.    Transfers:  Sit to Stand: [employer], X 1, cues for hand placement, with verbal cues  Stand to Sit:Contact Guard Assistance, X 1, cues for hand placement, with verbal cues  Cues for safety  Ambulation:  Contact Guard Assistance, X 1, with cues for safety, with verbal cues , with increased time for completion  Distance: 5 feet  Surface: Level Tile  Device:Rolling Walker  Gait Deviations:  Slow Leona, Decreased Step Length Bilaterally, Decreased Weight Shift Bilaterally, Decreased Gait Speed and Unsteady Gait  Cues for safety, as pt impulsive throughout  Balance:  Static Sitting Balance:  Supervision  Dynamic Sitting Balance: Supervision, Stand By Assistance  Static Standing Balance: Stand By AssistanceJuana for support for safety  Exercise:  Patient was guided in 1 set(s) 15 reps of exercise to both lower extremities. Ankle pumps, Glut sets, Quad sets, Heelslides, Hip abduction/adduction and Straight leg raises. Exercises were completed for increased independence with functional mobility. VC and visual cues for proper technique of exercises for completion with good demo    Functional Outcome Measures: Completed  AM-PAC Inpatient Mobility Raw Score : 17  AM-PAC Inpatient T-Scale Score : 42.13    ASSESSMENT:  Assessment: Patient progressing toward established goals.   Activity Tolerance:  Patient tolerance of  treatment: fair, increased pain     Equipment Recommendations:Equipment Needed: Yes (pt will require a RW)  Discharge Recommendations: Continue to assess pending progress, Patient would benefit from continued PT at discharge and Inpatient Therapy Stay  Plan: Times per week: 6x O  Current Treatment Recommendations: Strengthening,Neuromuscular Re-education,Home Exercise Program,Safety Education & Landon Seeley Training,Patient/Caregiver Education & Training,Functional Mobility Training,Equipment Evaluation, Education, & procurement,Gait Training,Stair training    Patient Education  Patient Education: Plan of Care, Precautions/Restrictions, Family Education, [employer], Equipment Education, Transfers, Gait, Use of Gait Belt, Verbal Exercise Instruction,  - Patient Verbalized Understanding, - Patient Requires Continued Education    Goals:  Patient goals : to build his strength up  Short term goals  Time Frame for Short term goals: by hospital d/c  Short term goal 1: Pt to demo supine <->sit with SBA with log roll technique for ability to get out of bed while maintaining spinal precautions  Short term goal 2: Pt to demo sit <->stand with SBA with LRAD for ease with getting up from various surfaces  Short term goal 3: Pt to ambulate >=50' with LRAD and S for improved ability to maneuver within his environment. Short term goal 4: Pt to ascend/descend 2 steps with uni R with SBA for ease wih home entry  Long term goals  Time Frame for Long term goals : NA due to short ELOS    Following session, patient left in safe position with all fall risk precautions in place. Pt in bedside chair for lunch following session, all needs and call light in reach.

## 2022-03-17 NOTE — PROGRESS NOTES
[address] [facility]  Occupational Therapy  Daily Note  Time:   Time In: 813  Time Out: 7338  Timed Code Treatment Minutes: 35 Minutes  Minutes: 33          Date: 3/17/2022  Patient Name: Zara Reynolds,   Gender: male      Room: [facility]28/028-A  MRN: [id number]  : 1955  (61 y.o.)  Referring Practitioner: Enoch Reynoso PA-C  Diagnosis: spinal stenosis of lumbari region with neurogenic claudication. Additional Pertinent Hx: per chart review; \"Woodrow Buck is a pleasant, active 61 y.o.  male, a current every day smoker tobacco with 1/2 pack 24-year history who denies current alcohol use and has a medical history significant for anxiety and depression, bradycardia, hyperlipidemia and hypertension, headache (unspecified), GERD, prolonged emergence from general anesthesia, cervical spondylosis with myelopathy, low back pain secondary to spinal stenosis with a surgical history significant for SI joint injection therapy along with nerve block therapy and cervical fusion performed by Dr. Derick moctezuma in  and prior lumbar laminectomy with TLIF from L2-S1 in 2018. On presentation patient complains of progressive severe low back pain worsening over the last 7 months and when exacerbated estimated at 9 out of 10 on a pain scale. The pain is increased with activity and standing and radiates to the legs bilaterally with weakness for right lower extremity thigh. Since CT and MRIs reveal extensive postoperative changes between L2 and S1 with moderate bilateral foraminal stenosis at L1 to along with mild to moderate bilateral foraminal stenosis at L4-5 and L5-S1 with significant degenerative changes involving the SI joints bilaterally. \" patient underwent a Revision previous lumbar spine decompression instrumentation & fusion with extension to the pelvis T10 and pelvis on 3/14/22.     Restrictions/Precautions:  Restrictions/Precautions: Fall Risk,General Precautions,Surgical Protocols  Required Braces or Orthoses  Spinal: Lumbar Corset  Position Activity Restriction  Spinal Precautions: No Bending,No Lifting,No Twisting  Other position/activity restrictions: 2 hemovac drains     SUBJECTIVE: Pt. RN okayed OT treatment. Pt. In room and agreeable to participate in OT session. PAIN: 8/10: low back pain    Vitals: Vitals not assessed per clinical judgement, see nursing flowsheet    COGNITION: WFL    ADL:   Grooming: Stand By Assistance and with set-up.  to complete oral care and to comb hair while seated. Upper Extremity Dressing: Moderate Assistance to don corset  Lower Body Dressing:Min A to don socks and shorts with LHAE instruction provided    BALANCE:  Sitting Balance:  Stand By Assistance. while seated on EOB  Standing Balance: Contact Guard Assistance. BED MOBILITY:  Not Tested    TRANSFERS:  Sit to Stand:  Contact Guard Assistance. Stand to Sit: Contact Guard Assistance. FUNCTIONAL MOBILITY:  Assistive Device: Rolling Walker  Assist Level:  Contact Guard Assistance and with verbal cues . Distance: within room short distance no LOB       ADDITIONAL ACTIVITIES:  Pt. Educated on LHAE use with LB dressing and incorporation of use in home setting to increase [place] with ADLs and safety in the home. Pt. Voices an understanding although would benefit continued review. ASSESSMENT:     Activity Tolerance:  Patient tolerance of  treatment: fair.        Discharge Recommendations: Inpatient Rehabilitation  Equipment Recommendations: Equipment Needed: Yes  Mobility Devices: ADL Assistive Devices  Plan: Times per week: 5x  Times per day: Daily  Current Treatment Recommendations: Endurance Training,Neuromuscular Re-education,Patient/Caregiver Education & Training,Self-Care / ADL,Equipment Evaluation, Education, & procurement,Safety Education & Training    Patient Education  Patient Education: ADL's, Precautions, Equipment Education, Home Safety, Importance of Increasing Activity and Assistive Device Safety and safety with functional mobility and transfers. Goals  Short term goals  Time Frame for Short term goals: by discharge  Short term goal 1: patient will tolerate 4 min functional standing with two hand release with sit to stand with CGA, participate with SBA to increase ease with toileting. Short term goal 2: patient will functionally ambulate house hold distances with least restrictive device with (S) and 0-1 verbal cues for safety and sequencing. Short term goal 3: patient will complete LB dressing with SBA with use of AE PRN and 0-1 verbal cues for spinal precautions. Short term goal 4: patient will complete UB ADLs with s/u and no verbal cues for spinal precautions. Following session, patient left in safe position with all fall risk precautions in place.

## 2022-03-17 NOTE — PROGRESS NOTES
Sonia initiated for acute inpatient rehab admission. Pending Susannah Brink #[phone] faxed clinicals to [phone].

## 2022-03-17 NOTE — CARE COORDINATION
3/17/22, 8:47 AM EDT    DISCHARGE PLANNING EVALUATION       Referral initiated to [address] [facility], [place]. Will need HH orders, if needed.

## 2022-03-17 NOTE — PROGRESS NOTES
Attending Note:     Patient was seen and examined by me in floor in conjunction with neurosurgery LARS Monroe PA-C). Discussed with patient, his nurse and the hospitalist team as well. All data and imaging reviewed by myself. I agree with examination assessment and plan as documented below. Devon Snellen, MD     Neurosurgery Progress Note    Patient:  Chepe Lemon      Unit/Bed:7K-28/028-A    YOB: 1955    MRN: [id number]     Acct: [de-identified]     Admit date: 3/14/2022    No chief complaint on file. Patient Seen, Chart, Physician notes, Labs, Radiology studies reviewed. Subjective: Patient is seen and evaluated on the floor having transition from the ICU setting without incident. Patient is resting comfortably postoperative day #3 from lumbar reexploration and extension of lumbar fusion to the pelvis and to T10 as performed by Dr. Julee Yeager, without complication. Pain was well to moderately controlled at the time of exam today with the patient stating that he feels much better today. Past, Family, Social History unchanged from admission. Diet:  ADULT DIET; Regular    Medications:  Scheduled Meds:   famotidine  20 mg Oral BID    polyethylene glycol  17 g Oral Daily    docusate sodium  100 mg Oral BID    senna  2 tablet Oral Nightly    traZODone  50 mg Oral Nightly    chlorhexidine  15 mL Mouth/Throat BID    sodium chloride flush  5-40 mL IntraVENous 2 times per day    enoxaparin  40 mg SubCUTAneous Q24H    gabapentin  800 mg Oral TID    lisinopril  5 mg Oral Daily     Continuous Infusions:   sodium chloride      sodium chloride       PRN Meds:HYDROmorphone, sodium chloride, tiZANidine, oxyCODONE-acetaminophen, diphenhydrAMINE, ketorolac, sodium chloride flush, sodium chloride, ondansetron **OR** ondansetron, acetaminophen **OR** acetaminophen, albuterol    Objective:  The patient is observed lying in bed with head of the bed elevated 163* 122*     Calcium:  Recent Labs     03/15/22  0438   CALCIUM 7.7*     Magnesium:No results for input(s): MG in the last 72 hours. Glucose:No results for input(s): POCGLU in the last 72 hours. HgbA1C: No results for input(s): LABA1C in the last 72 hours. Lipids: No results for input(s): CHOL, TRIG, HDL, LDL, LDLCALC in the last 72 hours. Radiology reports as per the Radiologist  Radiology: XR LUMBAR SPINE (2-3 VIEWS)    Result Date: 3/14/2022  X-ray lumbar spine one view Comparison: None Findings: Single AP fluoroscopic image of the lumbosacral spine. Laminectomy defect extending from L3-S1. Partial visualization of posterior metallic fusion hardware extending from L3-S1. Additional surgical screws overlying the bilateral sacroiliac joints. Interbody device at L5-S1. Appropriate alignment. No obvious hardware failure. Catheter overlying the pelvis. Impression: 1. Postsurgical changes of the lumbosacral spine. This document has been electronically signed by: Anjel Barlow MD on 03/14/2022 08:02 PM    XR CHEST PORTABLE    Result Date: 3/14/2022  1 view chest x-ray Comparison: None Findings: Foci of airspace filling at the bilateral lung bases. Endotracheal tube 5 cm above the aleksandra. Nasogastric versus orogastric tube distal to the diaphragm. Heart size is normal. Postsurgical changes of the thoracolumbar spine and cervical spine. Skin staples are present overlying the spine. Chronic deformity of the left distal clavicle. No acute displaced fracture. Bibasilar atelectasis and/or infiltrate. This document has been electronically signed by: Anjel Barlow MD on 03/14/2022 08:06 PM    XR CHEST PORTABLE    Result Date: 3/14/2022  1 view chest x-ray Comparison: MARI,SR - XR CHEST PORTABLE - 03/14/2022 07:32 PM EDT Findings: There are foci of airspace filling at the bilateral lung bases with interval worsening. The mid and upper lungs appear to be clear.  There is no gross evidence of pleural effusion or pneumothorax. Normal size heart. The nasogastric tube has been removed. An endotracheal tube tip is approximately 6 cm above the aleksandra. Status post recent thoracolumbar spine surgery. Postsurgical changes of the left clavicle. Bibasilar atelectasis and/or infiltrates with interval worsening. This document has been electronically signed by: Tj Armas MD on 03/14/2022 08:05 PM    FLUORO FOR SURGICAL PROCEDURES    Result Date: 3/15/2022  Radiology exam is complete. No Radiologist dictation. Please follow up with ordering provider. FLUORO FOR SURGICAL PROCEDURES    Result Date: 3/14/2022  Radiology exam is complete. No Radiologist dictation. Please follow up with ordering provider. Assessment: Status postoperative day #3 from reexploration lumbar fusion with extension to the pelvis and to T10 as performed by Dr. Bethel Ham, without complication. Principal Problem:    Spinal stenosis of lumbar region with neurogenic claudication  Active Problems:    COPD without exacerbation (HCC)    Acute respiratory failure (HCC)    Postoperative respiratory failure (HCC)    Acute blood loss anemia    Postlaminectomy syndrome    Chronic pain syndrome    Obesity (BMI 30-39. 9)    Essential hypertension  Resolved Problems:    * No resolved hospital problems. *        Plan: Patient is seen and evaluated on the floor having transitioned from the ICU and from stepdown without incident. Patient remains postoperative day #3 from lumbar reexploration of prior fusion with extension to the pelvis and to T10 as performed by Dr. Bethel Ham, without complication. Examination and evaluation are reviewed and discussed with Dr. Bethel Ham and with nursing. Patient was examined along with Cornelius Saha. Patient is resting comfortably this morning with pain well-controlled.   Recently changed dressings are intact over flat dry incisions and the drains are intact and functioning 190 cc per shift. Neurosurgery recommends continuation of PT and OT with up to a chair as tolerated. Inpatient rehabilitation consult is placed for evaluation for placement. Neurosurgery to follow.       Electronically signed by Sherley Escalante PA-C on 3/17/2022 at 7:44 AM    Neurosurgery

## 2022-03-18 LAB
BASOPHILS # BLD: 0.7 %
BASOPHILS ABSOLUTE: 0.1 THOU/MM3 (ref 0–0.1)
EOSINOPHIL # BLD: 4 %
EOSINOPHILS ABSOLUTE: 0.4 THOU/MM3 (ref 0–0.4)
ERYTHROCYTE [DISTWIDTH] IN BLOOD BY AUTOMATED COUNT: 14.5 % (ref 11.5–14.5)
ERYTHROCYTE [DISTWIDTH] IN BLOOD BY AUTOMATED COUNT: 48.5 FL (ref 35–45)
HCT VFR BLD CALC: 26.8 % (ref 42–52)
HEMOGLOBIN: 8.6 GM/DL (ref 14–18)
IMMATURE GRANS (ABS): 0.03 THOU/MM3 (ref 0–0.07)
IMMATURE GRANULOCYTES: 0.3 %
LYMPHOCYTES # BLD: 27.8 %
LYMPHOCYTES ABSOLUTE: 2.5 THOU/MM3 (ref 1–4.8)
MCH RBC QN AUTO: 29.4 PG (ref 26–33)
MCHC RBC AUTO-ENTMCNC: 32.1 GM/DL (ref 32.2–35.5)
MCV RBC AUTO: 91.5 FL (ref 80–94)
MONOCYTES # BLD: 11.7 %
MONOCYTES ABSOLUTE: 1.1 THOU/MM3 (ref 0.4–1.3)
NUCLEATED RED BLOOD CELLS: 0 /100 WBC
PLATELET # BLD: 198 THOU/MM3 (ref 130–400)
PMV BLD AUTO: 10.5 FL (ref 9.4–12.4)
RBC # BLD: 2.93 MILL/MM3 (ref 4.7–6.1)
SEG NEUTROPHILS: 55.5 %
SEGMENTED NEUTROPHILS ABSOLUTE COUNT: 5 THOU/MM3 (ref 1.8–7.7)
WBC # BLD: 9 THOU/MM3 (ref 4.8–10.8)

## 2022-03-18 PROCEDURE — 6360000002 HC RX W HCPCS: Performed by: NURSE PRACTITIONER

## 2022-03-18 PROCEDURE — 97530 THERAPEUTIC ACTIVITIES: CPT

## 2022-03-18 PROCEDURE — 6370000000 HC RX 637 (ALT 250 FOR IP)

## 2022-03-18 PROCEDURE — 97116 GAIT TRAINING THERAPY: CPT

## 2022-03-18 PROCEDURE — 6370000000 HC RX 637 (ALT 250 FOR IP): Performed by: NURSE PRACTITIONER

## 2022-03-18 PROCEDURE — 6370000000 HC RX 637 (ALT 250 FOR IP): Performed by: STUDENT IN AN ORGANIZED HEALTH CARE EDUCATION/TRAINING PROGRAM

## 2022-03-18 PROCEDURE — APPSS60 APP SPLIT SHARED TIME 46-60 MINUTES: Performed by: PHYSICIAN ASSISTANT

## 2022-03-18 PROCEDURE — 94640 AIRWAY INHALATION TREATMENT: CPT

## 2022-03-18 PROCEDURE — 99024 POSTOP FOLLOW-UP VISIT: CPT | Performed by: NEUROLOGICAL SURGERY

## 2022-03-18 PROCEDURE — 2580000003 HC RX 258: Performed by: NURSE PRACTITIONER

## 2022-03-18 PROCEDURE — 36415 COLL VENOUS BLD VENIPUNCTURE: CPT

## 2022-03-18 PROCEDURE — 94760 N-INVAS EAR/PLS OXIMETRY 1: CPT

## 2022-03-18 PROCEDURE — 94669 MECHANICAL CHEST WALL OSCILL: CPT

## 2022-03-18 PROCEDURE — 85025 COMPLETE CBC W/AUTO DIFF WBC: CPT

## 2022-03-18 PROCEDURE — 97110 THERAPEUTIC EXERCISES: CPT

## 2022-03-18 PROCEDURE — 6370000000 HC RX 637 (ALT 250 FOR IP): Performed by: PHYSICAL MEDICINE & REHABILITATION

## 2022-03-18 PROCEDURE — 6370000000 HC RX 637 (ALT 250 FOR IP): Performed by: PHYSICIAN ASSISTANT

## 2022-03-18 PROCEDURE — 97535 SELF CARE MNGMENT TRAINING: CPT

## 2022-03-18 PROCEDURE — 1200000000 HC SEMI PRIVATE

## 2022-03-18 PROCEDURE — 99232 SBSQ HOSP IP/OBS MODERATE 35: CPT | Performed by: FAMILY MEDICINE

## 2022-03-18 RX ADMIN — TIZANIDINE 4 MG: 4 TABLET ORAL at 00:57

## 2022-03-18 RX ADMIN — 0.12% CHLORHEXIDINE GLUCONATE 15 ML: 1.2 RINSE ORAL at 21:00

## 2022-03-18 RX ADMIN — GABAPENTIN 800 MG: 400 CAPSULE ORAL at 21:00

## 2022-03-18 RX ADMIN — OXYCODONE AND ACETAMINOPHEN 2 TABLET: 5; 325 TABLET ORAL at 16:41

## 2022-03-18 RX ADMIN — FAMOTIDINE 20 MG: 20 TABLET ORAL at 21:00

## 2022-03-18 RX ADMIN — GABAPENTIN 800 MG: 400 CAPSULE ORAL at 16:41

## 2022-03-18 RX ADMIN — DOCUSATE SODIUM 100 MG: 100 CAPSULE, LIQUID FILLED ORAL at 21:01

## 2022-03-18 RX ADMIN — ALBUTEROL SULFATE 2.5 MG: 2.5 SOLUTION RESPIRATORY (INHALATION) at 07:54

## 2022-03-18 RX ADMIN — TIZANIDINE 4 MG: 4 TABLET ORAL at 09:31

## 2022-03-18 RX ADMIN — TRAZODONE HYDROCHLORIDE 50 MG: 50 TABLET ORAL at 21:09

## 2022-03-18 RX ADMIN — SENNOSIDES 17.2 MG: 8.6 TABLET, COATED ORAL at 21:00

## 2022-03-18 RX ADMIN — POLYETHYLENE GLYCOL 3350 17 G: 17 POWDER, FOR SOLUTION ORAL at 08:15

## 2022-03-18 RX ADMIN — LISINOPRIL 5 MG: 5 TABLET ORAL at 08:16

## 2022-03-18 RX ADMIN — GABAPENTIN 800 MG: 400 CAPSULE ORAL at 08:16

## 2022-03-18 RX ADMIN — OXYCODONE AND ACETAMINOPHEN 2 TABLET: 5; 325 TABLET ORAL at 11:34

## 2022-03-18 RX ADMIN — ENOXAPARIN SODIUM 40 MG: 100 INJECTION SUBCUTANEOUS at 08:16

## 2022-03-18 RX ADMIN — 0.12% CHLORHEXIDINE GLUCONATE 15 ML: 1.2 RINSE ORAL at 08:15

## 2022-03-18 RX ADMIN — ALBUTEROL SULFATE 2.5 MG: 2.5 SOLUTION RESPIRATORY (INHALATION) at 23:09

## 2022-03-18 RX ADMIN — FAMOTIDINE 20 MG: 20 TABLET ORAL at 08:15

## 2022-03-18 RX ADMIN — DOCUSATE SODIUM 100 MG: 100 CAPSULE, LIQUID FILLED ORAL at 08:15

## 2022-03-18 RX ADMIN — SODIUM CHLORIDE, PRESERVATIVE FREE 10 ML: 5 INJECTION INTRAVENOUS at 21:01

## 2022-03-18 RX ADMIN — SODIUM CHLORIDE, PRESERVATIVE FREE 10 ML: 5 INJECTION INTRAVENOUS at 08:16

## 2022-03-18 RX ADMIN — TIOTROPIUM BROMIDE INHALATION SPRAY 2 PUFF: 3.12 SPRAY, METERED RESPIRATORY (INHALATION) at 07:54

## 2022-03-18 RX ADMIN — OXYCODONE AND ACETAMINOPHEN 2 TABLET: 5; 325 TABLET ORAL at 22:42

## 2022-03-18 RX ADMIN — OXYCODONE AND ACETAMINOPHEN 2 TABLET: 5; 325 TABLET ORAL at 05:09

## 2022-03-18 ASSESSMENT — PAIN DESCRIPTION - ONSET
ONSET: ON-GOING

## 2022-03-18 ASSESSMENT — PAIN DESCRIPTION - PAIN TYPE
TYPE: SURGICAL PAIN

## 2022-03-18 ASSESSMENT — PAIN DESCRIPTION - FREQUENCY
FREQUENCY: CONTINUOUS

## 2022-03-18 ASSESSMENT — PAIN DESCRIPTION - DIRECTION: RADIATING_TOWARDS: LEFT SIDE

## 2022-03-18 ASSESSMENT — PAIN SCALES - GENERAL
PAINLEVEL_OUTOF10: 7
PAINLEVEL_OUTOF10: 6
PAINLEVEL_OUTOF10: 8
PAINLEVEL_OUTOF10: 7
PAINLEVEL_OUTOF10: 8
PAINLEVEL_OUTOF10: 7

## 2022-03-18 ASSESSMENT — PAIN DESCRIPTION - DESCRIPTORS
DESCRIPTORS: STABBING
DESCRIPTORS: ACHING
DESCRIPTORS: STABBING

## 2022-03-18 ASSESSMENT — PAIN DESCRIPTION - LOCATION
LOCATION: BACK

## 2022-03-18 ASSESSMENT — PAIN DESCRIPTION - ORIENTATION
ORIENTATION: LOWER
ORIENTATION: MID
ORIENTATION: MID

## 2022-03-18 ASSESSMENT — PAIN - FUNCTIONAL ASSESSMENT
PAIN_FUNCTIONAL_ASSESSMENT: ACTIVITIES ARE NOT PREVENTED
PAIN_FUNCTIONAL_ASSESSMENT: PREVENTS OR INTERFERES SOME ACTIVE ACTIVITIES AND ADLS
PAIN_FUNCTIONAL_ASSESSMENT: ACTIVITIES ARE NOT PREVENTED

## 2022-03-18 NOTE — PROGRESS NOTES
Precert denied for acute inpatient rehab. Voicemail message left with Dayna ABEBE working until 5 p.m. Cherise Abebe, primary RN updated. PM&R team updated. Voicemail message left with Roman Keller working until 5 p.m. today.

## 2022-03-18 NOTE — PROGRESS NOTES
Upon assessment, dressing was found to be lifted and rolled in many spots but otherwise dry. Dressing change performed. After dressing change, scant bloody drainage noted at drain site.

## 2022-03-18 NOTE — PROGRESS NOTES
Phone call placed to [facility] and [facility] to check patients brace. Staff will come in on way home today.

## 2022-03-18 NOTE — CARE COORDINATION
3/18/22, 9:08 AM EDT    DISCHARGE ON GOING [address] day: 4  Location: -28/028-A Reason for admit: Spinal stenosis of lumbar region with neurogenic claudication [M48.062]  Acute respiratory failure ([address] [J96.00]   Procedure:   3/14 Revision previous lumbar spine decompression instrumentation & fusion with extension to the pelvis T10 and pelvis  3/14 Intubated - 3/14 Extubated    Barriers to Discharge: PT/OT, pain control, Physiatry following. PCP: Queenie Lester MD  Readmission Risk Score: 11.3 ( )%  Patient Goals/Plan/Treatment Preferences: From home. Precert initiated for IP Rehab.

## 2022-03-18 NOTE — PROGRESS NOTES
[address] [address]  Acute Inpatient Rehab Preadmission Assessment    Patient Name: Zara Reynolds        MRN: [id number]    : 1955  (61 y.o.)  Gender: male     Admitted from:[address]  Initial Assessment    Date of admission to the hospital: 3/14/2022  5:46 AM  Date patient eligible for admission:3/18/2022    Primary Diagnosis: Post laminectomy syndrome    Did patient have surgery? yes - Revision previous lumbar spine decompression instrumentation & fusion with extension to the pelvis T10 and pelvis     Physicians: Radha Ruth MD, Dr. Eli Egan, Dr. Mere Alcala for clinical complications/co-morbidities:   Past Medical History:   Diagnosis Date    Anxiety     Arthritis     Bradycardia     HR 50 on preop EKG    Cervical spondylosis with myelopathy     COPD (chronic obstructive pulmonary disease) (HCC)     breathing worse in the heat    Depression     GERD (gastroesophageal reflux disease)     Headache(784.0)     Hyperlipidemia     Hypertension     Low back pain     was on Xanax from pain clinic - no longer has script    Prolonged emergence from general anesthesia     Snoring     no apnea, BMI 27, neck circ. 15 inches, will wake coughing, no choking, no daytime somnolence    Tobacco abuse        Financial Information  Primary insurance:  [facility] Medicare    Secondary Insurance:   Medicaid    Has the patient had two or more falls in the past year or any fall with injury in the past year? no    Did the patient have major surgery during the 100 days prior to admission?   yes    Precautions:     Restrictions/Precautions: Fall Risk,General Precautions,Surgical Protocols  Other position/activity restrictions: 1 drain  Required Braces or Orthoses  Spinal: Lumbar Corset      Isolation Precautions: None       Physiatrist: Dr. Eli Egan    Patients Occupation: Retired    Reviewed Lab and Diagnostic reports from Current Admission: Yes    Patients Prior Functional  Level: Prior Function  ADL Assistance: Independent  Ambulation Assistance: Independent  Transfer Assistance: Independent  Additional Comments: Pt indep with no AD PTA. spouse is home 24/7 to assist as needed. Current functional status for upper extremity ADLs: Moderate assistance    Current functional status for lower extremity ADLs: Minimal assistance    Current functional status for bed, chair, wheelchair transfers: Contact guard assistance    Current functional status for toilet transfers: Contact guard assistance    Current functional status for locomotion: Contact guard assistance    Current functional status for bladder management: Minimal contact assistance    Current functional status for bowel management:Moderate assistance    Current functional status for comprehension: Complete independence    Current functional status for expression: Complete independence    Current functional status for social interaction: Complete independence    Current functional status for problem solving: Complete independence    Current functional status for memory: Complete independence    Expected level of Improvement in Self-Care:  Modified independence    Expected level of Improvement in Sphincter Control:  Modified independence    Expected level of Improvement in Transfers: Modified independence    Expected level of Improvement in Locomotion:  Modified independence    Expected level of Improvement in Communication and Social Cognition: Modified independence    Expected length of time to achieve that level of improvement: 2 weeks    Current rehab issues: ADL dysfunction,bladder management,bowel management,carry over of therapy techniques, discharge planning, disease and co-morbidity management, gait/mobility dysfunction, medication management, nutrition and hydration management, Ongoing assessment of safety, Pain management, Patient and family education, Prevention of secondary complications, Skin Integrity.     Required therapy: Physical Therapy, Occupational Therapy 3 hours per day, 5-6 days per week. Recreational Therapy 1 hour per week. Expected Discharge Destination: Home    Expected Post Discharge Treatments: Home Care    Other information relevant to the care needs:   Lives With: Spouse  Type of Home: Trailer  Home Layout: One level  Home Access: Stairs to enter with rails  Entrance Stairs - Number of Steps: 2  Entrance Stairs - Rails: Left  Bathroom Shower/Tub: Tub/Shower unit  Bathroom Accessibility: Accessible  ADL Assistance: Independent  Ambulation Assistance: Independent  Transfer Assistance: Independent  Active : Yes  Occupation: Retired  Additional Comments: Pt indep with no AD PTA. spouse is home 24/7 to assist as needed. Acute Inpatient Rehabilitation Disclosure Statement provided to patient. Patient verbalized understanding. I have reviewed and concur with the findings and results of the pre-admission screening assessment completed by the Inpatient Rehabilitation Admissions Coordinator.     Dar Cornejo MD

## 2022-03-18 NOTE — PROGRESS NOTES
[address] [address]  INPATIENT PHYSICAL THERAPY  DAILY NOTE  [facility] ORTHOPEDICS 7K - 7K-28/028-A    Time In: 8606  Time Out: 1150  Timed Code Treatment Minutes: 24 Minutes  Minutes: 24          Date: 3/18/2022  Patient Name: Scot Colindres,  Gender:  male        MRN: [id number]  : 1955  (61 y.o.)     Referring Practitioner: Cecily Mtz MD  Diagnosis: spinal stenosis of lumbar region with neurogenic claudication. Additional Pertinent Hx: Per EMR: Scot Colindres is a pleasant, active 61 y.o.  male, a current every day smoker tobacco with 1/2 pack 24-year history who denies current alcohol use and has a medical history significant for anxiety and depression, bradycardia, hyperlipidemia and hypertension, headache (unspecified), GERD, prolonged emergence from general anesthesia, cervical spondylosis with myelopathy, low back pain secondary to spinal stenosis with a surgical history significant for SI joint injection therapy along with nerve block therapy and cervical fusion performed by Dr. Lizzeth moctezuma in  and prior lumbar laminectomy with TLIF from L2-S1 in 2018. On presentation patient complains of progressive severe low back pain worsening over the last 7 months and when exacerbated estimated at 9 out of 10 on a pain scale. The pain is increased with activity and standing and radiates to the legs bilaterally with weakness for right lower extremity thigh. Since CT and MRIs reveal extensive postoperative changes between L2 and S1 with moderate bilateral foraminal stenosis at L1 to along with mild to moderate bilateral foraminal stenosis at L4-5 and L5-S1 with significant degenerative changes involving the SI joints bilaterally. \" patient underwent a Revision previous lumbar spine decompression instrumentation & fusion with extension to the pelvis T10 and pelvis on 3/14/22     Prior Level of Function:  Lives With: Spouse  Type of Home: Trailer  Home Layout: One level  Home Access: Stairs to enter with rails  Entrance Stairs - Number of Steps: 2  Entrance Stairs - Rails: Left   Bathroom Shower/Tub: Tub/Shower unit  Bathroom Accessibility: Accessible    ADL Assistance: Independent  Ambulation Assistance: Independent  Transfer Assistance: Independent  Active : Yes  Additional Comments: Pt indep with no AD PTA. spouse is home 24/7 to assist as needed. Restrictions/Precautions:  Restrictions/Precautions: Fall Risk,General Precautions,Surgical Protocols  Required Braces or Orthoses  Spinal: Lumbar Corset  Position Activity Restriction  Spinal Precautions: No Bending,No Lifting,No Twisting  Other position/activity restrictions: drain removed 3/18     SUBJECTIVE: pt in recliner upon arrival and agrees to therapy. Pt pleasant and cooperative for session. PAIN: back 7/10: RN gave meds during session    Vitals: RN checked vitals during session, see flow sheet    OBJECTIVE:  Bed Mobility:  Rolling to Left: Stand By Assistance   Supine to Sit: Stand By Assistance, with rail, with verbal cues , with increased time for completion  Scooting: Stand By Assistance, with increased time for completion    Transfers:  Sit to Stand: Contact Guard Assistance  Stand to [employer] Assistance    Ambulation:  Stand By Assistance, [address], with cues for safety, with verbal cues , with increased time for completion  Distance: 40ft  Surface: Level Tile  Device:Rolling Walker  Gait Deviations: Forward Flexed Posture, Slow Leona, Decreased Step Length Bilaterally, Decreased Gait Speed, Decreased Heel Strike Bilaterally, Mild Path Deviations, Unsteady Gait, Decreased Terminal Knee Extension and decreased quad control noted vanessa, shaky and min unsteady, did fatigue with this distance    Exercise:  Patient was guided in 1 set(s) 10 reps of exercise to both lower extremities. Ankle pumps, Glut sets, Quad sets, Pelvic tilts and hooklying pillow squeezes.   Exercises were completed for increased independence with functional mobility. Functional Outcome Measures: Completed  AM-PAC Inpatient Mobility Raw Score : 16  AM-PAC Inpatient T-Scale Score : 40.78    ASSESSMENT:  Assessment: Patient progressing toward established goals. Activity Tolerance:  Patient tolerance of  treatment: good. Pt demos decreased independence with mobility, limited strength and activity tolerance. Pt unsteady on feet and would benefit from cont PT at this time to ensure safety at d/c. Equipment Recommendations:Equipment Needed: Yes (pt will require a RW)  Discharge Recommendations: Inpatient Rehabilitation  Plan: Times per week: 6x O  Current Treatment Recommendations: Strengthening,Neuromuscular Re-education,Home Exercise Program,Safety Education & Training,Balance Training,Endurance Training,Patient/Caregiver Education & Training,Functional Mobility Training,Equipment Evaluation, Education, & procurement,Gait Training,Stair training    Patient Education  Patient Education: Plan of Care, Precautions/Restrictions, [employer] Mobility, Transfers, Gait, Verbal Exercise Instruction    Goals:  Patient goals : to build his strength up  Short term goals  Time Frame for Short term goals: by hospital d/c  Short term goal 1: Pt to demo supine <->sit with SBA with log roll technique for ability to get out of bed while maintaining spinal precautions  Short term goal 2: Pt to demo sit <->stand with SBA with LRAD for ease with getting up from various surfaces  Short term goal 3: Pt to ambulate >=50' with LRAD and S for improved ability to maneuver within his environment. Short term goal 4: Pt to ascend/descend 2 steps with uni R with SBA for ease wih home entry  Long term goals  Time Frame for Long term goals : NA due to short ELOS    Following session, patient left in safe position with all fall risk precautions in place.

## 2022-03-18 NOTE — CARE COORDINATION
3/18/22, 4:37 PM EDT    DISCHARGE ON GOING [address] day: 4  Location: 7K-28/028-A Reason for admit: Spinal stenosis of lumbar region with neurogenic claudication [M48.062]  Acute respiratory failure ([address] [J96.00]   Procedure: 3/14 Revision previous lumbar spine decompression instrumentation & fusion with extension to the pelvis T10 and pelvis  3/14 Intubated - 3/14 Extubated  Barriers to Discharge: Notified that pt was denied Inpt Rehab. Spoke with Jayson Wood, pt primary RN. Jayson Wood states pt definitely needs therapy post significant back surgery. Advised Jayson Wood that SW would need notified for possible placement for therapy at discharge. If not agreeable, surgeon will need to agree/be notified of pt to return home. Possible therapy at home. PCP: Shweta Carney MD  Readmission Risk Score: 11.3 ( )%  Patient Goals/Plan/Treatment Preferences: Denied Inpt Rehab. SW following for needs possible SNF for therapy vs home with home therapy services. If pt discharged home he will need orders for walker, BSC, nebulizer, (Hip hit?) and obtain per [facility]. Candy notified of this.

## 2022-03-18 NOTE — PROGRESS NOTES
Hospitalist Progress Note      Patient:  Gary Mann    Unit/Bed:7K-28/028-A  YOB: 1955  MRN: [id number]   Acct: [de-identified]   PCP: Derick Sullivan MD  Date of Admission: 3/14/2022    Assessment/Plan:  1. Acute Postprocedural Respiratory Failure. Surgery on the lumbar spine complicated by an inability to extubate in the PACU. Therefore, patient was sent to the ICU. Patient was successfully extubated on 3/15/2022 and then sent to  on 3/16/2022. Initially required approximately 2 L of oxygen but is now saturate well on room air. Mild hypoxic respiratory failure probably secondary to atelectasis. Treated with Acapella and incentive spirometry. 2.  Acute Intraoperative Hemorrhagic Anemia.  2 L intraoperative blood loss. However, has been hemodynamically stable since. Given unclear cause no need for work-up with iron studies. Follow with daily CBC. 3.  Benign Essential Hypertension. Only on lisinopril 5 mg daily as an outpatient. Recent high blood pressure probably secondary to pain. Management of postoperative pain as below. Increase lisinopril or add a second agent as necessary. 4.  Chronic obstructive pulmonary disease, unspecified. Is not on home oxygen. No PFTs in our records, so cannot stage. Will maintain on home tiotropium 2 puffs daily along with as needed albuterol. 5.  Status Post Lumbar Spine Decompression and Instrumentation Fusion. Complications as above. Neurosurgery following. On Colace 100 mg twice daily, Senokot 17.2 mg 2 tablets nightly, and MiraLAX 17 g daily for bowel regimen. On trazodone 50 mg nightly for sleep. On Dilaudid 1 mg every 3 hours as needed, Zanaflex 4 mg every 8 hours as needed, Percocet every 4 hours as needed, and Toradol 50 mg every 6 hours as needed for pain. On Benadryl 25 mg every 6 hours as needed for allergies. Neurosurgery will follow.   Patient was seen by Physical Medicine and Rehabilitation doctor and is awaiting pre-CERT. 6.  Leukocytosis, unspecified. Secondary to recent surgery. Will monitor with daily CBC. Expected discharge date: 3/18/2022    Disposition:    [] Home       [] TCU       [x] Rehab       [] Psych       [] SNF       [] [place]       [] Other-    Chief Complaint: Postsurgical Respiratory Insufficiency. Opening Statement:  Patient is a 71-year-old male with a past medical history of chronic obstructive pulmonary disease, bradycardia, GERD, arthritis, hyperlipidemia, hypertension, cervical spondylosis with myelopathy, and cervical fusion in 2015 and lumbar laminectomy in 2018, right cluneal nerve block in 2021, and injection in the right sacroiliac joint in 2021 who came to the hospital for a revision of previous lumbar spine decompression instrumentation and fusion with extension to the pelvis and T10. Hospitalist team is managing primarily for postoperative respiratory insufficiency. Hospital Treatment Course:   Patient had a revision of a previous lumbar spine decompression instrumentation fusion with extension to the pelvis and T10 to address postlaminectomy chronic pain syndrome. Patient was noted to have postoperative respiratory insufficiency and could not be extubated in the PACU. Therefore, he was sent to the ICU. Also lost approximately 2 L of blood in the OR. Patient was successfully extubated on 3/15/2022 and was sent to 4A on 3/16/2022. Patient is currently awake and alert. However, he is noting numbness in the dorsal and lateral aspects of the left leg, traumatic chest pain, and postsurgical back pain. Pain regimen is managed by neurosurgery. Patient she had mild acute hypoxic respiratory failure after intubation requiring 2 L but has since been saturating well on room air. Awaiting pre-CERT to inpatient rehab at [facility] [place]     Subjective (past 24 hours):   Left-sided chest pain status post awake, alert, and in no acute distress. Capable of answering questions and following commands. Examination somewhat limited postsurgically. Hemovac noted. Notable tenderness to palpation over the left chest.   Very poor dentition. HENT:      Head: Normocephalic and atraumatic. Right Ear: External ear normal.      Left Ear: External ear normal.      Mouth/Throat:      Mouth: Mucous membranes are moist.      Pharynx: Oropharynx is clear. Cardiovascular:      Rate and Rhythm: Normal rate and regular rhythm. Pulses:           Radial pulses are 2+ on the right side and 2+ on the left side. Heart sounds: Normal heart sounds. Pulmonary:      Effort: Pulmonary effort is normal. No respiratory distress. Breath sounds: Normal breath sounds. No wheezing or rales. Comments: But examination limited. Abdominal:      General: Abdomen is flat. There is no distension. Palpations: Abdomen is soft. Tenderness: There is no abdominal tenderness. There is no guarding. Musculoskeletal:      Right lower leg: No edema. Left lower leg: No edema. Skin:     General: Skin is warm and dry. Capillary Refill: Capillary refill takes less than 2 seconds. Neurological:      Mental Status: He is alert. Psychiatric:         Mood and Affect: Mood normal.         Behavior: Behavior normal.         Labs:   Recent Labs     03/15/22  0438 03/16/22  1745 03/17/22  0716   WBC 12.4* 12.0* 11.3*   HGB 10.7* 9.5* 9.1*   HCT 32.6* 29.2* 28.0*    163 153     Recent Labs     03/15/22  0438 03/17/22  0716    143   K 4.3 4.9   * 109   CO2 19* 27   BUN 20 13   CREATININE 0.7 0.6   CALCIUM 7.7* 8.5     Recent Labs     03/15/22  0438   AST 32   ALT 14   BILITOT 0.5   ALKPHOS 57     No results for input(s): INR in the last 72 hours. No results for input(s): Fadi Grates in the last 72 hours.     Microbiology:    Blood culture #1: No results found for: [facility]    Blood culture #2:No results found for: [facility]    Organism:No results found for: ORG    No results found for: LABGRAM    MRSA culture only:No results found for: [facility]    Urine culture:   Lab Results   Component Value Date    LABURIN No growth-preliminary No growth  03/14/2022       Respiratory culture: No results found for: CULTRESP    Aerobic and Anaerobic :  No results found for: LABAERO  No results found for: LABANAE    Urinalysis:    No results found for: NITRU, WBCUA, BACTERIA, RBCUA, BLOODU, SPECGRAV, GLUCOSEU    Radiology:  XR CHEST (2 VW)   Final Result   1. Normal heart size. No effusion seen. Prior anterior cervical fusion. Prior thoracolumbar fusion with metallic hardware in place. 2. Moderate bibasilar atelectasis/pneumonia. 3. Overall appearance of chest has worsened somewhat since prior            **This report has been created using voice recognition software. It may contain minor errors which are inherent in voice recognition technology. **      Final report electronically signed by Dr. Benites Done on 3/16/2022 9:55 AM      XR CHEST PORTABLE   Final Result   Bibasilar atelectasis and/or infiltrate. This document has been electronically signed by: Fahad Haile MD on    03/14/2022 08:06 PM      XR CHEST PORTABLE   Final Result   Bibasilar atelectasis and/or infiltrates with interval worsening. This document has been electronically signed by: Fahad Haile MD on    03/14/2022 08:05 PM      XR LUMBAR SPINE (2-3 VIEWS)   Final Result   Impression:   1. Postsurgical changes of the lumbosacral spine.       This document has been electronically signed by: Fahad Haile MD on    03/14/2022 08:02 PM      FLUORO FOR SURGICAL PROCEDURES   Final Result      FLUORO FOR SURGICAL PROCEDURES   Final Result      XR SPINE SCOLIOSIS STANDING (1 VIEW)    (Results Pending)     XR LUMBAR SPINE (2-3 VIEWS)    Result Date: 3/14/2022  X-ray lumbar spine one view Comparison: None Findings: Single AP fluoroscopic image of the lumbosacral spine. Laminectomy defect extending from L3-S1. Partial visualization of posterior metallic fusion hardware extending from L3-S1. Additional surgical screws overlying the bilateral sacroiliac joints. Interbody device at L5-S1. Appropriate alignment. No obvious hardware failure. Catheter overlying the pelvis. Impression: 1. Postsurgical changes of the lumbosacral spine. This document has been electronically signed by: Kylie Amor MD on 03/14/2022 08:02 PM    XR CHEST PORTABLE    Result Date: 3/14/2022  1 view chest x-ray Comparison: None Findings: Foci of airspace filling at the bilateral lung bases. Endotracheal tube 5 cm above the aleksandra. Nasogastric versus orogastric tube distal to the diaphragm. Heart size is normal. Postsurgical changes of the thoracolumbar spine and cervical spine. Skin staples are present overlying the spine. Chronic deformity of the left distal clavicle. No acute displaced fracture. Bibasilar atelectasis and/or infiltrate. This document has been electronically signed by: Kylie Amor MD on 03/14/2022 08:06 PM    XR CHEST PORTABLE    Result Date: 3/14/2022  1 view chest x-ray Comparison: CR,SR - XR CHEST PORTABLE - 03/14/2022 07:32 PM EDT Findings: There are foci of airspace filling at the bilateral lung bases with interval worsening. The mid and upper lungs appear to be clear. There is no gross evidence of pleural effusion or pneumothorax. Normal size heart. The nasogastric tube has been removed. An endotracheal tube tip is approximately 6 cm above the aleksandra. Status post recent thoracolumbar spine surgery. Postsurgical changes of the left clavicle. Bibasilar atelectasis and/or infiltrates with interval worsening. This document has been electronically signed by: Kylie Amor MD on 03/14/2022 08:05 PM    FLUORO FOR SURGICAL PROCEDURES    Result Date: 3/15/2022  Radiology exam is complete. No Radiologist dictation. Please follow up with ordering provider. FLUORO FOR SURGICAL PROCEDURES    Result Date: 3/14/2022  Radiology exam is complete. No Radiologist dictation. Please follow up with ordering provider. Support Devices (date placed):  [] ETT []Oral / [] Nasal  [] Gastric Tube [] OG / [] NG    [] Central Venous Line (Specify Site):  [] Urinary Catheter  [] Arterial Line (Specify Site)  [x] Peripheral IV access  [x] Other: Closed suction/drain inferior; right; medial back     DVT prophylaxis: [] Lovenox                                 [] SCDs                                 [] SQ Heparin                                 [] Encourage ambulation           [] Already on Anticoagulation   Being held postsurgically.      Code Status: Full Code    Tele:   [x] yes             [] no    Electronically signed by Ivette Schulz MD, MPH, [facility] on 3/18/2022 at 12:20 AM   Supervised by Dr. Norma Grajeda

## 2022-03-18 NOTE — PROGRESS NOTES
Attending Note:     Patient was seen and examined by me in floor in conjunction with neurosurgery LARS Lindsay PA-C). Discussed with patient, his nurse and the hospitalist team as well. All data and imaging reviewed by myself. I agree with examination assessment and plan as documented below. Catrachita Moore MD     Neurosurgery Progress Note    Patient:  Wyatt Lujan      Unit/Bed:-28/028-A    YOB: 1955    MRN: [id number]     Acct: [de-identified]     Admit date: 3/14/2022    No chief complaint on file. Patient Seen, Chart, Physician notes, Labs, Radiology studies reviewed. Subjective: Patient is seen and evaluated on the floor with evaluation exam findings reviewed and discussed with Dr. Tello Peters and with nursing. Patient is resting comfortably today with pain very much controlled. Past, Family, Social History unchanged from admission. Diet:  ADULT DIET; Regular    Medications:  Scheduled Meds:   tiotropium  2 puff Inhalation Daily    bupivacaine  30 mL IntraDERmal Once    famotidine  20 mg Oral BID    polyethylene glycol  17 g Oral Daily    docusate sodium  100 mg Oral BID    senna  2 tablet Oral Nightly    traZODone  50 mg Oral Nightly    chlorhexidine  15 mL Mouth/Throat BID    sodium chloride flush  5-40 mL IntraVENous 2 times per day    enoxaparin  40 mg SubCUTAneous Q24H    gabapentin  800 mg Oral TID    lisinopril  5 mg Oral Daily     Continuous Infusions:   sodium chloride      sodium chloride       PRN Meds:oxyCODONE-acetaminophen, HYDROmorphone, sodium chloride, tiZANidine, diphenhydrAMINE, ketorolac, sodium chloride flush, sodium chloride, ondansetron **OR** ondansetron, acetaminophen **OR** acetaminophen, albuterol    Objective: Patient is observed this morning sitting up at the edge of the bed with pain very well controlled.   Dressings were changed over a flat dry incision following removal of the surgical drain performed at bedside this morning without incident. Single sutures were applied to the drain sites and dressings were applied over as well. He remained stable and neurologically intact to his baseline on exam this morning with no additional significant changes in his been ambulating with and without assistance. Vitals: /63   Pulse 90   Temp 97.9 °F (36.6 °C) (Oral)   Resp 18   Ht 5' 10\" (1.778 m)   Wt 238 lb 5.1 oz (108.1 kg)   SpO2 96%   BMI 34.20 kg/m²     Physical Exam   Patient continues with improvement of lower extremity pain and weakness. No additional significant changes are noted the patient chart overnight. Physical Exam:  Alert and attentive. Language appropriate, with no aphasia. Pupils equal.  Facial strength symmetric. Review of Systems     congestion.    Eyes: Negative for visual disturbance. Respiratory: Negative for chest tightness.    Cardiovascular: Negative for chest pain. Gastrointestinal: Negative for abdominal pain. Genitourinary: Negative for difficulty urinating. Musculoskeletal: Positive for back pain (incisional site), gait problem. Skin: Negative for wound. Neurological: Positive for weakness. Psychiatric/Behavioral: Negative for confusion.   24 hour intake/output:    Intake/Output Summary (Last 24 hours) at 3/18/2022 0811  Last data filed at 3/18/2022 0017  Gross per 24 hour   Intake 1142 ml   Output 1210 ml   Net -68 ml     Last 3 weights: Wt Readings from Last 3 Encounters:   03/16/22 238 lb 5.1 oz (108.1 kg)   02/18/22 209 lb (94.8 kg)   01/12/22 209 lb 12.8 oz (95.2 kg)         CBC:   Recent Labs     03/16/22  1745 03/17/22  0716 03/18/22  0542   WBC 12.0* 11.3* 9.0   HGB 9.5* 9.1* 8.6*    153 198     BMP:    Recent Labs     03/17/22  0716      K 4.9      CO2 27   BUN 13   CREATININE 0.6   GLUCOSE 106     Calcium:  Recent Labs     03/17/22  0716   CALCIUM 8.5     Magnesium:No results for input(s): MG in the last 72 hours. Glucose: No results for input(s): POCGLU in the last 72 hours. HgbA1C: No results for input(s): LABA1C in the last 72 hours. Lipids: No results for input(s): CHOL, TRIG, HDL, LDL, LDLCALC in the last 72 hours. Radiology reports as per the Radiologist  Radiology: XR LUMBAR SPINE (2-3 VIEWS)    Result Date: 3/14/2022  X-ray lumbar spine one view Comparison: None Findings: Single AP fluoroscopic image of the lumbosacral spine. Laminectomy defect extending from L3-S1. Partial visualization of posterior metallic fusion hardware extending from L3-S1. Additional surgical screws overlying the bilateral sacroiliac joints. Interbody device at L5-S1. Appropriate alignment. No obvious hardware failure. Catheter overlying the pelvis. Impression: 1. Postsurgical changes of the lumbosacral spine. This document has been electronically signed by: Thomas Rae MD on 03/14/2022 08:02 PM    XR CHEST PORTABLE    Result Date: 3/14/2022  1 view chest x-ray Comparison: None Findings: Foci of airspace filling at the bilateral lung bases. Endotracheal tube 5 cm above the aleksandra. Nasogastric versus orogastric tube distal to the diaphragm. Heart size is normal. Postsurgical changes of the thoracolumbar spine and cervical spine. Skin staples are present overlying the spine. Chronic deformity of the left distal clavicle. No acute displaced fracture. Bibasilar atelectasis and/or infiltrate. This document has been electronically signed by: Thomas Rae MD on 03/14/2022 08:06 PM    XR CHEST PORTABLE    Result Date: 3/14/2022  1 view chest x-ray Comparison: CR,SR - XR CHEST PORTABLE - 03/14/2022 07:32 PM EDT Findings: There are foci of airspace filling at the bilateral lung bases with interval worsening. The mid and upper lungs appear to be clear. There is no gross evidence of pleural effusion or pneumothorax. Normal size heart. The nasogastric tube has been removed. An endotracheal tube tip is approximately 6 cm above the aleksandra.  Status post adjustments made to pain medications with pain very well controlled at his exam this morning. Additionally he is stable and intact neurologically with no additional significant changes noted. Neurosurgery to follow.       Electronically signed by Sushila Monroe PA-C on 3/18/2022 at 8:11 AM    Neurosurgery

## 2022-03-18 NOTE — PLAN OF CARE
Problem: Discharge Planning:  Goal: Patients continuum of care needs are met  Description: Patients continuum of care needs are met  Outcome: Ongoing  Note: Denied inpatient rehab. Plan ECF vs. home with home health with therapy. Patient feels it would be a struggle to go to outpatient therapy due to car rides and getting in and out of cars. Generalized weakness. Will need walker, hip kit, and high rise commode. Care plan reviewed with patient. Patient verbalizes understanding of the plan of care and contribute to goal setting.

## 2022-03-18 NOTE — FLOWSHEET NOTE
[facility]  PHYSICAL THERAPY MISSED TREATMENT NOTE  ACUTE CARE  [facility]              Missed Treatment  Pt working with OT at time of attempt, will try back later

## 2022-03-18 NOTE — PROGRESS NOTES
Called insurance company to check precert status according to representative the case is currently under review. Call reference number is the pending auth number according to the represenative [id number].

## 2022-03-18 NOTE — PROGRESS NOTES
[facility]  Occupational Therapy  Daily Note  Time:   Time In: 4646  Time Out: 1050  Timed Code Treatment Minutes: 55 Minutes  Minutes: 55          Date: 3/18/2022  Patient Name: Amparo Coy,   Gender: male      Room: [id number]/028-A  MRN: [id number]  : 1955  (61 y.o.)  Referring Practitioner: Esther Gunter PA-C  Diagnosis: spinal stenosis of lumbari region with neurogenic claudication. Additional Pertinent Hx: per chart review; \"Woodrow Pollard is a pleasant, active 61 y.o.  male, a current every day smoker tobacco with 1/2 pack 24-year history who denies current alcohol use and has a medical history significant for anxiety and depression, bradycardia, hyperlipidemia and hypertension, headache (unspecified), GERD, prolonged emergence from general anesthesia, cervical spondylosis with myelopathy, low back pain secondary to spinal stenosis with a surgical history significant for SI joint injection therapy along with nerve block therapy and cervical fusion performed by Dr. Florin moctezuma in  and prior lumbar laminectomy with TLIF from L2-S1 in 2018. On presentation patient complains of progressive severe low back pain worsening over the last 7 months and when exacerbated estimated at 9 out of 10 on a pain scale. The pain is increased with activity and standing and radiates to the legs bilaterally with weakness for right lower extremity thigh. Since CT and MRIs reveal extensive postoperative changes between L2 and S1 with moderate bilateral foraminal stenosis at L1 to along with mild to moderate bilateral foraminal stenosis at L4-5 and L5-S1 with significant degenerative changes involving the SI joints bilaterally. \" patient underwent a Revision previous lumbar spine decompression instrumentation & fusion with extension to the pelvis T10 and pelvis on 3/14/22.     Restrictions/Precautions:  Restrictions/Precautions: Fall Risk,General Precautions,Surgical Protocols  Required Braces or Orthoses  Spinal: Lumbar Corset  Position Activity Restriction  Spinal Precautions: No Bending,No Lifting,No Twisting  Other position/activity restrictions: drain removed 3/18     SUBJECTIVE: Patient seated in bedside chair upon arrival; agreeable to therapy this date. Patient motivated throughout session, states he hopes to be able to walk out into hallway later today. PAIN: 9/10: drain site, nursing notified    Vitals: [place] not assessed per clinical judgement, see nursing flowsheet    COGNITION: Decreased Safety Awareness    ADL:   Grooming: Contact Guard Assistance. standing at sink to wash hair  Bathing: Contact Guard Assistance. seated, able to maintain spinal precautions with verbal cues with techniques   Upper Extremity Dressing: Minimal Assistance, with set-up, with verbal cues  and with increased time for completion. to kodak/doff gown. Provided verbal/visual technique in order to kodak lumbar corset seated; demonstrating understanding  Lower Extremity Dressing: Stand By Assistance. seated in chair utilizing sock aid to kodak B socks. BALANCE:  Sitting Balance:  Supervision. Standing Balance: Contact Guard Assistance, X 1, with cues for safety, with verbal cues , with increased time for completion. RW, no LOB    BED MOBILITY:  Not Tested    TRANSFERS:  Sit to Stand:  Contact Guard Assistance, X 1, with increased time for completion, cues for hand placement, with verbal cues, to/from chair with arms. Stand to Sit: Contact Guard Assistance, X 1, with increased time for completion, cues for hand placement, with verbal cues, to/from chair with arms. FUNCTIONAL MOBILITY:  Assistive Device: Rolling Walker  Assist Level:  Contact Guard Assistance. Distance:  To and from bathroom  Slow pace, no LOB     ADDITIONAL ACTIVITIES:  Patient completed BUE strengthening exercises with skilled education on HEP: completed x10 reps x1 set with a minimal resistance in all joints and all planes in order to improve UE strength and activity tolerance required for BADL routine and toilet / shower transfers. Patient tolerated good, requiring minimal rest breaks. Patient also required minimal verbal/visual cues for technique. ASSESSMENT:     Activity Tolerance:  Patient tolerance of  treatment: good. Discharge Recommendations: Inpatient Rehabilitation  Equipment Recommendations: Equipment Needed: Yes  Mobility Devices: ADL Assistive Devices  Plan: Times per week: 5x  Times per day: Daily  Current Treatment Recommendations: Endurance Training,Neuromuscular Re-education,Patient/Caregiver Education & Training,Self-Care / ADL,Equipment Evaluation, Education, & procurement,Safety Education & Training    Patient Education  Patient Education: Role of OT, Plan of Care, ADL's, IADL's, Home Exercise Program, Precautions, Home Safety, Importance of Increasing Activity and Assistive Device Safety Patient able to vocalize all precautions independently. Goals  Short term goals  Time Frame for Short term goals: by discharge  Short term goal 1: patient will tolerate 4 min functional standing with two hand release with sit to stand with CGA, participate with SBA to increase ease with toileting. Short term goal 2: patient will functionally ambulate house hold distances with least restrictive device with (S) and 0-1 verbal cues for safety and sequencing. Short term goal 3: patient will complete LB dressing with SBA with use of AE PRN and 0-1 verbal cues for spinal precautions. Short term goal 4: patient will complete UB ADLs with s/u and no verbal cues for spinal precautions. Following session, patient left in safe position with all fall risk precautions in place.

## 2022-03-19 VITALS
OXYGEN SATURATION: 95 % | HEART RATE: 89 BPM | RESPIRATION RATE: 16 BRPM | HEIGHT: 70 IN | TEMPERATURE: 98.1 F | DIASTOLIC BLOOD PRESSURE: 61 MMHG | SYSTOLIC BLOOD PRESSURE: 104 MMHG | WEIGHT: 238.32 LBS | BODY MASS INDEX: 34.12 KG/M2

## 2022-03-19 LAB
BASOPHILS # BLD: 0.8 %
BASOPHILS ABSOLUTE: 0.1 THOU/MM3 (ref 0–0.1)
EOSINOPHIL # BLD: 5.2 %
EOSINOPHILS ABSOLUTE: 0.5 THOU/MM3 (ref 0–0.4)
ERYTHROCYTE [DISTWIDTH] IN BLOOD BY AUTOMATED COUNT: 14.6 % (ref 11.5–14.5)
ERYTHROCYTE [DISTWIDTH] IN BLOOD BY AUTOMATED COUNT: 50 FL (ref 35–45)
HCT VFR BLD CALC: 27.2 % (ref 42–52)
HEMOGLOBIN: 8.6 GM/DL (ref 14–18)
IMMATURE GRANS (ABS): 0.04 THOU/MM3 (ref 0–0.07)
IMMATURE GRANULOCYTES: 0.4 %
LYMPHOCYTES # BLD: 25.3 %
LYMPHOCYTES ABSOLUTE: 2.3 THOU/MM3 (ref 1–4.8)
MCH RBC QN AUTO: 29.7 PG (ref 26–33)
MCHC RBC AUTO-ENTMCNC: 31.6 GM/DL (ref 32.2–35.5)
MCV RBC AUTO: 93.8 FL (ref 80–94)
MONOCYTES # BLD: 12.3 %
MONOCYTES ABSOLUTE: 1.1 THOU/MM3 (ref 0.4–1.3)
NUCLEATED RED BLOOD CELLS: 0 /100 WBC
PLATELET # BLD: 217 THOU/MM3 (ref 130–400)
PMV BLD AUTO: 10.4 FL (ref 9.4–12.4)
RBC # BLD: 2.9 MILL/MM3 (ref 4.7–6.1)
SEG NEUTROPHILS: 56 %
SEGMENTED NEUTROPHILS ABSOLUTE COUNT: 5 THOU/MM3 (ref 1.8–7.7)
WBC # BLD: 9 THOU/MM3 (ref 4.8–10.8)

## 2022-03-19 PROCEDURE — 94640 AIRWAY INHALATION TREATMENT: CPT

## 2022-03-19 PROCEDURE — 6360000002 HC RX W HCPCS: Performed by: NURSE PRACTITIONER

## 2022-03-19 PROCEDURE — 99239 HOSP IP/OBS DSCHRG MGMT >30: CPT | Performed by: FAMILY MEDICINE

## 2022-03-19 PROCEDURE — 99024 POSTOP FOLLOW-UP VISIT: CPT | Performed by: NEUROLOGICAL SURGERY

## 2022-03-19 PROCEDURE — 6370000000 HC RX 637 (ALT 250 FOR IP): Performed by: PHYSICIAN ASSISTANT

## 2022-03-19 PROCEDURE — APPSS30 APP SPLIT SHARED TIME 16-30 MINUTES: Performed by: PHYSICIAN ASSISTANT

## 2022-03-19 PROCEDURE — 6360000002 HC RX W HCPCS: Performed by: PHYSICIAN ASSISTANT

## 2022-03-19 PROCEDURE — 6370000000 HC RX 637 (ALT 250 FOR IP)

## 2022-03-19 PROCEDURE — 94760 N-INVAS EAR/PLS OXIMETRY 1: CPT

## 2022-03-19 PROCEDURE — 94669 MECHANICAL CHEST WALL OSCILL: CPT

## 2022-03-19 PROCEDURE — 36415 COLL VENOUS BLD VENIPUNCTURE: CPT

## 2022-03-19 PROCEDURE — 6370000000 HC RX 637 (ALT 250 FOR IP): Performed by: PHYSICAL MEDICINE & REHABILITATION

## 2022-03-19 PROCEDURE — 6370000000 HC RX 637 (ALT 250 FOR IP): Performed by: NURSE PRACTITIONER

## 2022-03-19 PROCEDURE — 85025 COMPLETE CBC W/AUTO DIFF WBC: CPT

## 2022-03-19 RX ORDER — DOCUSATE SODIUM 100 MG/1
100 CAPSULE, LIQUID FILLED ORAL 2 TIMES DAILY
COMMUNITY
Start: 2022-03-19 | End: 2022-08-25

## 2022-03-19 RX ORDER — SENNA PLUS 8.6 MG/1
2 TABLET ORAL NIGHTLY
Qty: 60 TABLET | Refills: 0 | COMMUNITY
Start: 2022-03-19 | End: 2022-04-18

## 2022-03-19 RX ORDER — NALOXONE HYDROCHLORIDE 4 MG/.1ML
1 SPRAY NASAL PRN
Qty: 1 EACH | Refills: 5 | Status: SHIPPED | OUTPATIENT
Start: 2022-03-19 | End: 2022-08-25

## 2022-03-19 RX ORDER — POLYETHYLENE GLYCOL 3350 17 G/17G
17 POWDER, FOR SOLUTION ORAL DAILY PRN
Qty: 527 G | Refills: 1 | COMMUNITY
Start: 2022-03-19 | End: 2022-04-18

## 2022-03-19 RX ORDER — OXYCODONE HYDROCHLORIDE AND ACETAMINOPHEN 5; 325 MG/1; MG/1
2 TABLET ORAL EVERY 6 HOURS PRN
Qty: 56 TABLET | Refills: 0 | Status: SHIPPED | OUTPATIENT
Start: 2022-03-19 | End: 2022-03-30 | Stop reason: SDUPTHER

## 2022-03-19 RX ADMIN — GABAPENTIN 800 MG: 400 CAPSULE ORAL at 08:59

## 2022-03-19 RX ADMIN — LISINOPRIL 5 MG: 5 TABLET ORAL at 09:01

## 2022-03-19 RX ADMIN — OXYCODONE AND ACETAMINOPHEN 2 TABLET: 5; 325 TABLET ORAL at 04:46

## 2022-03-19 RX ADMIN — FAMOTIDINE 20 MG: 20 TABLET ORAL at 09:01

## 2022-03-19 RX ADMIN — KETOROLAC TROMETHAMINE 15 MG: 30 INJECTION, SOLUTION INTRAMUSCULAR; INTRAVENOUS at 03:10

## 2022-03-19 RX ADMIN — POLYETHYLENE GLYCOL 3350 17 G: 17 POWDER, FOR SOLUTION ORAL at 09:01

## 2022-03-19 RX ADMIN — 0.12% CHLORHEXIDINE GLUCONATE 15 ML: 1.2 RINSE ORAL at 09:01

## 2022-03-19 RX ADMIN — TIOTROPIUM BROMIDE INHALATION SPRAY 2 PUFF: 3.12 SPRAY, METERED RESPIRATORY (INHALATION) at 07:54

## 2022-03-19 RX ADMIN — ENOXAPARIN SODIUM 40 MG: 100 INJECTION SUBCUTANEOUS at 09:02

## 2022-03-19 RX ADMIN — DOCUSATE SODIUM 100 MG: 100 CAPSULE, LIQUID FILLED ORAL at 09:01

## 2022-03-19 ASSESSMENT — PAIN SCALES - GENERAL
PAINLEVEL_OUTOF10: 0
PAINLEVEL_OUTOF10: 9

## 2022-03-19 ASSESSMENT — ENCOUNTER SYMPTOMS
SHORTNESS OF BREATH: 0
CHEST TIGHTNESS: 0
ABDOMINAL PAIN: 0
ABDOMINAL DISTENTION: 0
BACK PAIN: 1
APNEA: 0

## 2022-03-19 NOTE — PLAN OF CARE
Problem: Falls - Risk of:  Goal: Will remain free from falls  Description: Will remain free from falls  Outcome: Ongoing  Note: No falls this shift. Pt using call light appropriately to call for assistance with ambulation to the bathroom and to chair. Pt is also compliant with use of non-skid slippers. Pt reports understanding of fall prevention when discussed. Problem: Falls - Risk of:  Goal: Absence of physical injury  Description: Absence of physical injury  Outcome: Ongoing  Note: Pt free from injury. Using call light appropriately     Problem: Pain:  Goal: Pain level will decrease  Description: Pain level will decrease  Outcome: Ongoing  Note: Pt reports back pain at 8-9/10 on scale. Pt states oral/iv/im medication helping to achieve pain goal of a 5 on scale. Pt turning and repositioning for comfort. Pt getting periods of rest     Problem: Skin Integrity:  Goal: Will show no infection signs and symptoms  Description: Will show no infection signs and symptoms  Outcome: Ongoing  Note: No s/s of infection. Pt afebrile. Back dressing D & I     Problem: Skin Integrity:  Goal: Absence of new skin breakdown  Description: Absence of new skin breakdown  Outcome: Ongoing  Note: No new skin breakdown noted     Problem: Mobility - Impaired:  Goal: Mobility will improve  Description: Mobility will improve  Outcome: Ongoing  Note: Pt up with 1 assist and walker     Problem: Safety:  Goal: Free from accidental physical injury  Description: Free from accidental physical injury  Outcome: Ongoing  Note: Pt free from injury, using call light     Problem: Daily Care:  Goal: Daily care needs are met  Description: Daily care needs are met  Outcome: Ongoing  Note: Pt needs assistance with ADLs     Problem: Skin Integrity:  Goal: Skin integrity will stabilize  Description: Skin integrity will stabilize  Outcome: Ongoing  Note: Skin integrity stable.  Pt repositions and turns self     Problem: Discharge Planning:  Goal: Patients continuum of care needs are met  Description: Patients continuum of care needs are met  3/19/2022 0419 by Jose Maria Grant, CONSUELO  Outcome: Ongoing  Note: Pt wants the long in mery if rehab denied    Care plan reviewed with patient. Patient verbalize understanding of the plan of care and contribute to goal setting.

## 2022-03-19 NOTE — CARE COORDINATION
Sally Taylor requires a bedside commode due to being confined to one level of the home, and is physically incapable of utilizing regular toilet facilities. Current body weight is Weight: 238 lb 5.1 oz (108.1 kg).

## 2022-03-19 NOTE — CARE COORDINATION
3/19/22, 9:31 AM EDT    Patient goals/plan/ treatment preferences discussed by  and . Patient goals/plan/ treatment preferences reviewed with patient/ family. Patient/ family verbalize understanding of discharge plan and are in agreement with goal/plan/treatment preferences. Understanding was demonstrated using the teach back method. AVS provided by RN at time of discharge, which includes all necessary medical information pertaining to the patients current course of illness, treatment, post-discharge goals of care, and treatment preferences. Services After Discharge  Services At/After Discharge: PT,OT,Nursing Services   IMM Letter  IMM Letter given to Patient/Family/Significant other/Guardian/POA/by[de-identified] M  IMM Letter date given[de-identified] 03/18/22  IMM Letter time given[de-identified] 0845       Orders received for [facility] and home medical equipment. Spoke with pt, he prefers [facility]. Message left at 930 am regarding referral. Awaiting return call. Called [facility], they do not deliver BSC or walker on weekends. Pt will need to get his [facility].    9:38 AM EDT  Received call from Fest[facility] Connolly. They can accept pt. Paperwork faxed. They will see tomorrow. 12:01 PM EDT  Spoke with pt, given 2 wheeled walker from [address] Script and documentation given to pt for [facility]. States he will be able to get on his own.

## 2022-03-19 NOTE — DISCHARGE SUMMARY
Hospitalist Discharge Summary     Patient Identification:  Taurus Avila  : 1955  MRN: [id number]   Account: [de-identified]     Admit date: 3/14/2022  Discharge date: 3/19/2022   Attending provider: Bindu Cam MD        Primary care provider: Kiersten Gonzalez MD     Discharge Diagnoses:   1. Postlaminectomy syndrome s/p reexploration/revision lumbar fusion with extension to pelvis and to T10 by Dr. Reggie Duffy 3/14/22  2. Acute postoperative respiratory insufficiency -not failure  3. Acute blood loss anemia due to surgical loss  4. Leukocytosis  5. COPD without exacerbation  6. Benign essential hypertension  7. Grade 1 diastolic dysfunction  8. Chronic pain syndrome  9. Hyperlipidemia  10. Mild MR  11. Tobacco abuse  12. GERD  13. Anxiety/depression     Hospital Course:   Patient is a 53-year-old male with a past medical history of chronic obstructive pulmonary disease, bradycardia, GERD, arthritis, hyperlipidemia, hypertension, cervical spondylosis with myelopathy, and cervical fusion in  and lumbar laminectomy in , right cluneal nerve block in , and injection in the right sacroiliac joint in  who came to the hospital for a revision of previous lumbar spine decompression instrumentation and fusion with extension to the pelvis and T10 by Dr. Reggie Duffy on 3/14/22  Patient was noted to have postoperative respiratory insufficiency and could not be extubated in the PACU. Therefore, he was sent to the ICU where he was followed by [facility] team.  Patient was successfully extubated on 3/14/2022 and was sent to  on 3/15/2022 to hospitalist service. CXR on 3/16 showed bilateral atelectasis, no effusions or pulmonary edema. He was given an incentive spirometer and encouraged use. He was weaned to room air 3/16 and remained stable on room air prior to discharge. Neurosurgery team followed and managed his pain medication and was treated with Percocet prn and muscle relaxers.   This controlled his pain prior to d/c and pain medication prescriptions addressed per NS at d/c. He participated with PT and OT during his postoperative period. PMR was consulted for further rehab needs. They felt he was a good rehab candidate. However patient progressed well and his pre-CERT for inpatient rehab was denied by the insurance company. Neurosurgery cleared patient for discharge home with follow-up in 2 weeks. Patient felt comfortable with going home with home health PT, OT, RN. Necessary DME equipment was ordered and provided to the patient and home health was arranged prior to discharge. He did lose 2 L of blood in the OR. His hemoglobin went from 15.8 on admission 3/14 down to 8.6 on 3/18. His drain output was improving and his drains were removed. Hemoglobin was stable at 8.6 on the day of discharge and had showed no signs of bleeding. An occasional leukocytosis likely reactive due to surgery but no fevers thus unlikely infectious etiology. He was hemodynamically stable, afebrile, on room air, H&H stable, electrolytes and renal function stable. He is cleared by neurosurgery for discharge home as he was denied inpatient rehab by his insurance company. He was set up with necessary DME equipment as well as home health PT, OT, RN. He felt comfortable discharge home with his wife as he was discharged home in stable condition. Discharge Medications:     Medication List      START taking these medications    docusate sodium 100 MG capsule  Commonly known as: COLACE  Take 1 capsule by mouth 2 times daily     polyethylene glycol 17 g packet  Commonly known as: GLYCOLAX  Take 17 g by mouth daily as needed for Constipation     senna 8.6 MG tablet  Commonly known as: SENOKOT  Take 2 tablets by mouth nightly        CHANGE how you take these medications    Ventolin  (90 Base) MCG/ACT inhaler  Generic drug: albuterol sulfate HFA  What changed: Another medication with the same name was removed.  Continue taking this medication, and follow the directions you see here. CONTINUE taking these medications    aspirin 81 MG chewable tablet  Take 1 tablet by mouth daily     gabapentin 800 MG tablet  Commonly known as: NEURONTIN  TAKE 1 TABLET BY MOUTH THREE TIMES A DAY     lisinopril 5 MG tablet  Commonly known as: PRINIVIL;ZESTRIL     tiotropium 18 MCG inhalation capsule  Commonly known as: SPIRIVA     tiZANidine 4 MG tablet  Commonly known as: ZANAFLEX  Take 1 tablet by mouth every 8 hours as needed (muscle spasms)        ASK your doctor about these medications    tapentadol 100 MG Tabs  Commonly known as: Nucynta  Take 1 tablet by mouth 2 times daily as needed for Pain for up to 30 days. Where to Get Your Medications      You can get these medications from any pharmacy    You don't need a prescription for these medications  · docusate sodium 100 MG capsule  · polyethylene glycol 17 g packet  · senna 8.6 MG tablet         Patient Instructions:    Discharge lab work: none  Activity: no lifting, Driving, or Strenuous exercise until f/u with NS  Diet: ADULT DIET;  Regular    Code Status: Full Code    Follow-up visits:   -- PCP 1 week  -- NS 2 weeks  -- HH at d/c    Procedures:   3/14/2022 by Dr. Kellen Nunez = Revision previous lumbar spine decompression instrumentation & fusion with extension to the pelvis T10 and pelvis       Consults:   IP CONSULT TO CRITICAL CARE  IP CONSULT TO SOCIAL WORK  IP CONSULT TO PHYSICAL MEDICINE REHAB  IP CONSULT TO REHAB/TCU ADMISSION COORDINATOR  IP CONSULT TO HOME CARE NEEDS      Examination:  Vitals:  Vitals:    03/18/22 1130 03/18/22 1630 03/18/22 2049 03/19/22 0258   BP: 103/61 129/72 123/66 116/65   Pulse: 92 84 76 72   Resp: 18 18 18 16   Temp: 97.8 °F (36.6 °C) 97.8 °F (36.6 °C) 97.9 °F (36.6 °C) 98.1 °F (36.7 °C)   TempSrc: Oral Oral Oral Oral   SpO2: 95% 92% 100% 100%   Weight:       Height:         Weight: Weight: 238 lb 5.1 oz (108.1 kg)     24 hour intake/output:    Intake/Output Summary (Last 24 hours) at 3/19/2022 0736  Last data filed at 3/19/2022 0546  Gross per 24 hour   Intake 1090 ml   Output --   Net 1090 ml       General appearance - alert, well appearing, and in no distress and oriented to person, place, and time  Chest - clear to auscultation, no wheezes, rales or rhonchi, symmetric air entry, no tachypnea, retractions or cyanosis  Heart - normal rate, regular rhythm, normal S1, S2, no murmurs, rubs, clicks or gallops  Abdomen - soft, nontender, nondistended, no masses or organomegaly  bowel sounds normal  Obese: No; Protuberant: No   Neurological - alert, oriented, normal speech, no focal findings or movement disorder noted, cranial nerves II through XII intact  Extremities - peripheral pulses normal, no pedal edema, no clubbing or cyanosis  Skin - normal coloration and turgor, no rashes, no suspicious skin lesions noted  Back incision bandaged w/o shadowing - slight edema around incision    Significant Diagnostics:   Radiology:   XR CHEST (2 VW)   Final Result   1. Normal heart size. No effusion seen. Prior anterior cervical fusion. Prior thoracolumbar fusion with metallic hardware in place. 2. Moderate bibasilar atelectasis/pneumonia. 3. Overall appearance of chest has worsened somewhat since prior            **This report has been created using voice recognition software. It may contain minor errors which are inherent in voice recognition technology. **      Final report electronically signed by Dr. Elizabet Santos on 3/16/2022 9:55 AM      XR CHEST PORTABLE   Final Result   Bibasilar atelectasis and/or infiltrate. This document has been electronically signed by: Yonatan Bautista MD on    03/14/2022 08:06 PM      XR CHEST PORTABLE   Final Result   Bibasilar atelectasis and/or infiltrates with interval worsening.       This document has been electronically signed by: Yonatan Bautista MD on    03/14/2022 08:05 PM      XR LUMBAR SPINE (2-3 VIEWS)   Final Result   Impression:   1. Postsurgical changes of the lumbosacral spine.       This document has been electronically signed by: Dana Schmitz MD on    03/14/2022 08:02 PM      FLUORO FOR SURGICAL PROCEDURES   Final Result      FLUORO FOR SURGICAL PROCEDURES   Final Result      XR SPINE SCOLIOSIS STANDING (1 VIEW)    (Results Pending)       Labs:   Recent Results (from the past 72 hour(s))   CBC    Collection Time: 03/16/22  5:45 PM   Result Value Ref Range    WBC 12.0 (H) 4.8 - 10.8 thou/mm3    RBC 3.16 (L) 4.70 - 6.10 mill/mm3    Hemoglobin 9.5 (L) 14.0 - 18.0 gm/dl    Hematocrit 29.2 (L) 42.0 - 52.0 %    MCV 92.4 80.0 - 94.0 fL    MCH 30.1 26.0 - 33.0 pg    MCHC 32.5 32.2 - 35.5 gm/dl    RDW-CV 14.6 (H) 11.5 - 14.5 %    RDW-SD 50.4 (H) 35.0 - 45.0 fL    Platelets 979 087 - 470 thou/mm3    MPV 10.4 9.4 - 12.4 fL   Basic Metabolic Panel    Collection Time: 03/17/22  7:16 AM   Result Value Ref Range    Sodium 143 135 - 145 meq/L    Potassium 4.9 3.5 - 5.2 meq/L    Chloride 109 98 - 111 meq/L    CO2 27 23 - 33 meq/L    Glucose 106 70 - 108 mg/dL    BUN 13 7 - 22 mg/dL    CREATININE 0.6 0.4 - 1.2 mg/dL    Calcium 8.5 8.5 - 10.5 mg/dL   CBC with Auto Differential    Collection Time: 03/17/22  7:16 AM   Result Value Ref Range    WBC 11.3 (H) 4.8 - 10.8 thou/mm3    RBC 3.05 (L) 4.70 - 6.10 mill/mm3    Hemoglobin 9.1 (L) 14.0 - 18.0 gm/dl    Hematocrit 28.0 (L) 42.0 - 52.0 %    MCV 91.8 80.0 - 94.0 fL    MCH 29.8 26.0 - 33.0 pg    MCHC 32.5 32.2 - 35.5 gm/dl    RDW-CV 14.6 (H) 11.5 - 14.5 %    RDW-SD 49.2 (H) 35.0 - 45.0 fL    Platelets 352 823 - 710 thou/mm3    MPV 10.3 9.4 - 12.4 fL    Seg Neutrophils 62.7 %    Lymphocytes 21.9 %    Monocytes 12.0 %    Eosinophils 2.4 %    Basophils 0.6 %    Immature Granulocytes 0.4 %    Segs Absolute 7.1 1.8 - 7.7 thou/mm3    Lymphocytes Absolute 2.5 1.0 - 4.8 thou/mm3    Monocytes Absolute 1.4 (H) 0.4 - 1.3 thou/mm3    Eosinophils Absolute 0.3 0.0 - 0.4 thou/mm3 Basophils Absolute 0.1 0.0 - 0.1 thou/mm3    Immature Grans (Abs) 0.05 0.00 - 0.07 thou/mm3    nRBC 0 /100 wbc   Anion Gap    Collection Time: 03/17/22  7:16 AM   Result Value Ref Range    Anion Gap 7.0 (L) 8.0 - 16.0 meq/L   Glomerular Filtration Rate, Estimated    Collection Time: 03/17/22  7:16 AM   Result Value Ref Range    Est, Glom Filt Rate >90 ml/min/1.73m2   CBC with Auto Differential    Collection Time: 03/18/22  5:42 AM   Result Value Ref Range    WBC 9.0 4.8 - 10.8 thou/mm3    RBC 2.93 (L) 4.70 - 6.10 mill/mm3    Hemoglobin 8.6 (L) 14.0 - 18.0 gm/dl    Hematocrit 26.8 (L) 42.0 - 52.0 %    MCV 91.5 80.0 - 94.0 fL    MCH 29.4 26.0 - 33.0 pg    MCHC 32.1 (L) 32.2 - 35.5 gm/dl    RDW-CV 14.5 11.5 - 14.5 %    RDW-SD 48.5 (H) 35.0 - 45.0 fL    Platelets 498 691 - 116 thou/mm3    MPV 10.5 9.4 - 12.4 fL    Seg Neutrophils 55.5 %    Lymphocytes 27.8 %    Monocytes 11.7 %    Eosinophils 4.0 %    Basophils 0.7 %    Immature Granulocytes 0.3 %    Segs Absolute 5.0 1.8 - 7.7 thou/mm3    Lymphocytes Absolute 2.5 1.0 - 4.8 thou/mm3    Monocytes Absolute 1.1 0.4 - 1.3 thou/mm3    Eosinophils Absolute 0.4 0.0 - 0.4 thou/mm3    Basophils Absolute 0.1 0.0 - 0.1 thou/mm3    Immature Grans (Abs) 0.03 0.00 - 0.07 thou/mm3    nRBC 0 /100 wbc   CBC with Auto Differential    Collection Time: 03/19/22  6:05 AM   Result Value Ref Range    WBC 9.0 4.8 - 10.8 thou/mm3    RBC 2.90 (L) 4.70 - 6.10 mill/mm3    Hemoglobin 8.6 (L) 14.0 - 18.0 gm/dl    Hematocrit 27.2 (L) 42.0 - 52.0 %    MCV 93.8 80.0 - 94.0 fL    MCH 29.7 26.0 - 33.0 pg    MCHC 31.6 (L) 32.2 - 35.5 gm/dl    RDW-CV 14.6 (H) 11.5 - 14.5 %    RDW-SD 50.0 (H) 35.0 - 45.0 fL    Platelets 267 648 - 163 thou/mm3    MPV 10.4 9.4 - 12.4 fL    Seg Neutrophils 56.0 %    Lymphocytes 25.3 %    Monocytes 12.3 %    Eosinophils 5.2 %    Basophils 0.8 %    Immature Granulocytes 0.4 %    Segs Absolute 5.0 1.8 - 7.7 thou/mm3    Lymphocytes Absolute 2.3 1.0 - 4.8 thou/mm3    Monocytes Absolute 1.1 0.4 - 1.3 thou/mm3    Eosinophils Absolute 0.5 (H) 0.0 - 0.4 thou/mm3    Basophils Absolute 0.1 0.0 - 0.1 thou/mm3    Immature Grans (Abs) 0.04 0.00 - 0.07 thou/mm3    nRBC 0 /100 wbc       Discharge condition: stable  Disposition: Home with [facility]  Time spent on discharge: 40 min    Electronically signed by Jaciel Jordan MD on 3/19/2022 at 7:36 AM

## 2022-03-19 NOTE — PROGRESS NOTES
Attending Note:     Patient was seen and examined by me in floor in conjunction with neurosurgery PA Tyler Zambrano PA-C). Discussed with patient, his nurse and the hospitalist team as well. All data and imaging reviewed by myself. I agree with examination assessment and plan as documented below. Kayode Capellan MD     Neurosurgery Progress Note    Patient:  Mary Jenkins      Unit/Bed:7K-28/028-A    YOB: 1955    MRN: [id number]     Acct: [de-identified]     Admit date: 3/14/2022    No chief complaint on file. Patient Seen, Chart, Physician notes, Labs, Radiology studies reviewed. Subjective: Patient is seen and evaluated on the floor with evaluation exam findings reviewed and discussed with Dr. Rossy Penn and with nursing. Patient is resting comfortably postoperative day #5 from posterior lumbar reexploration and revision of lumbar fusion with extension to the pelvis and to T10 is indicated and performed by Dr. Rossy Penn, without complications. Pain is very well controlled today. Past, Family, Social History unchanged from admission. Diet:  ADULT DIET;  Regular    Medications:  Scheduled Meds:   tiotropium  2 puff Inhalation Daily    bupivacaine  30 mL IntraDERmal Once    famotidine  20 mg Oral BID    polyethylene glycol  17 g Oral Daily    docusate sodium  100 mg Oral BID    senna  2 tablet Oral Nightly    traZODone  50 mg Oral Nightly    chlorhexidine  15 mL Mouth/Throat BID    sodium chloride flush  5-40 mL IntraVENous 2 times per day    enoxaparin  40 mg SubCUTAneous Q24H    gabapentin  800 mg Oral TID    lisinopril  5 mg Oral Daily     Continuous Infusions:   sodium chloride      sodium chloride       PRN Meds:oxyCODONE-acetaminophen, HYDROmorphone, sodium chloride, tiZANidine, diphenhydrAMINE, ketorolac, sodium chloride flush, sodium chloride, ondansetron **OR** ondansetron, acetaminophen **OR** acetaminophen, albuterol    Objective: Patient is observed lying in bed with the head of the bed elevated approximately 30 degrees and pain very well controlled. Recently changed dressings remain intact over flat dry incisions including the drain sites. He remains otherwise stable and intact neurologically to his baseline with no additional significant changes noted overnight. He has been ambulating with and without assistance in anticipation of discharge planning. Vitals: /65   Pulse 72   Temp 98.1 °F (36.7 °C) (Oral)   Resp 16   Ht 5' 10\" (1.778 m)   Wt 238 lb 5.1 oz (108.1 kg)   SpO2 100%   BMI 34.20 kg/m²     Physical Exam   Patient remained stable and neurologically intact to his baseline with no additional significant changes noted overnight. Physical Exam:  Alert and attentive. Language appropriate, with no aphasia. Pupils equal.  Facial strength symmetric. Review of Systems   Constitutional: Negative for activity change. Respiratory: Negative for apnea, chest tightness and shortness of breath. Cardiovascular: Negative for chest pain and leg swelling. Gastrointestinal: Negative for abdominal distention and abdominal pain. Genitourinary: Negative for difficulty urinating. Musculoskeletal: Positive for back pain. Incisional site discomfort noted with considerable improvement for radiating back pain   Neurological: Negative for headaches. Psychiatric/Behavioral: Negative for agitation, behavioral problems, confusion and decreased concentration. 24 hour intake/output:    Intake/Output Summary (Last 24 hours) at 3/19/2022 0738  Last data filed at 3/19/2022 0546  Gross per 24 hour   Intake 1090 ml   Output --   Net 1090 ml     Last 3 weights:   Wt Readings from Last 3 Encounters:   03/16/22 238 lb 5.1 oz (108.1 kg)   02/18/22 209 lb (94.8 kg)   01/12/22 209 lb 12.8 oz (95.2 kg)         CBC:   Recent Labs     03/17/22  0716 03/18/22  0542 03/19/22  0605   WBC 11.3* 9.0 9.0   HGB 9.1* 8.6* 8.6*   PLT 153 198 217     BMP:    Recent Labs     03/17/22  0716      K 4.9      CO2 27   BUN 13   CREATININE 0.6   GLUCOSE 106     Calcium:  Recent Labs     03/17/22  0716   CALCIUM 8.5     Magnesium:No results for input(s): MG in the last 72 hours. Glucose:No results for input(s): POCGLU in the last 72 hours. HgbA1C: No results for input(s): LABA1C in the last 72 hours. Lipids: No results for input(s): CHOL, TRIG, HDL, LDL, LDLCALC in the last 72 hours. Radiology reports as per the Radiologist  Radiology: XR LUMBAR SPINE (2-3 VIEWS)    Result Date: 3/14/2022  X-ray lumbar spine one view Comparison: None Findings: Single AP fluoroscopic image of the lumbosacral spine. Laminectomy defect extending from L3-S1. Partial visualization of posterior metallic fusion hardware extending from L3-S1. Additional surgical screws overlying the bilateral sacroiliac joints. Interbody device at L5-S1. Appropriate alignment. No obvious hardware failure. Catheter overlying the pelvis. Impression: 1. Postsurgical changes of the lumbosacral spine. This document has been electronically signed by: Marcelle Chandler MD on 03/14/2022 08:02 PM    XR CHEST PORTABLE    Result Date: 3/14/2022  1 view chest x-ray Comparison: None Findings: Foci of airspace filling at the bilateral lung bases. Endotracheal tube 5 cm above the aleksandra. Nasogastric versus orogastric tube distal to the diaphragm. Heart size is normal. Postsurgical changes of the thoracolumbar spine and cervical spine. Skin staples are present overlying the spine. Chronic deformity of the left distal clavicle. No acute displaced fracture. Bibasilar atelectasis and/or infiltrate. This document has been electronically signed by: Marcelle Chandler MD on 03/14/2022 08:06 PM    XR CHEST PORTABLE    Result Date: 3/14/2022  1 view chest x-ray Comparison: MARI,SR - XR CHEST PORTABLE - 03/14/2022 07:32 PM EDT Findings:  There are foci of airspace filling at the bilateral lung bases with interval worsening. The mid and upper lungs appear to be clear. There is no gross evidence of pleural effusion or pneumothorax. Normal size heart. The nasogastric tube has been removed. An endotracheal tube tip is approximately 6 cm above the aleksandra. Status post recent thoracolumbar spine surgery. Postsurgical changes of the left clavicle. Bibasilar atelectasis and/or infiltrates with interval worsening. This document has been electronically signed by: Heidi Johnson MD on 03/14/2022 08:05 PM    FLUORO FOR SURGICAL PROCEDURES    Result Date: 3/15/2022  Radiology exam is complete. No Radiologist dictation. Please follow up with ordering provider. FLUORO FOR SURGICAL PROCEDURES    Result Date: 3/14/2022  Radiology exam is complete. No Radiologist dictation. Please follow up with ordering provider. Assessment: Status post day 5 from reexploration lumbar fusion with extension to the pelvis and to T10 as indicated and performed by Dr. Rossy Penn, without complication. Principal Problem:    Spinal stenosis of lumbar region with neurogenic claudication  Active Problems:    COPD without exacerbation (HCC)    Acute respiratory failure (HCC)    Postoperative respiratory failure (HCC)    Acute blood loss anemia    Postlaminectomy syndrome    Chronic pain syndrome    Obesity (BMI 30-39. 9)    Essential hypertension    Metabolic acidosis  Resolved Problems:    * No resolved hospital problems. *        Plan: The patient is seen and evaluated on the floor with evaluation and exam findings reviewed and discussed with Dr. Rossy Penn and with nursing and with hospitalist.  Patient is resting comfortably with pain very well controlled. He has been ambulating with and without assistance in anticipation of discharge planning recommended home with home health.   Incisions are flat and dry with recently changed dressings intact and on exam he is otherwise stable and intact to his baseline with no additional significant changes noted.   Neurosurgery recommends a follow-up with our service at 2 weeks postoperative for suture removal.  Neurosurgery to follow      Electronically signed by Katie Roberts PA-C on 3/19/2022 at 7:38 AM    Neurosurgery

## 2022-03-19 NOTE — PROGRESS NOTES
AVS was printed and discussed with the patient. All questions and concerns were answered. Peripheral IV was removed without any complications. All patient's belongings were collected and taken prior. Patient was discharged home and wife is providing transportation to home. Harry Gregg was delivered to bedside and script for bedside commode was given to patient along with pain medications prescriptions.

## 2022-03-19 NOTE — CARE COORDINATION
Doni Isaac was evaluated today and a DME order was entered for a wheeled walker because he requires this to successfully complete daily living tasks of ambulating. A wheeled walker is necessary due to the patient's unsteady gait, upper body weakness, and inability to  an ambulation device; and he can ambulate only by pushing a walker instead of a lesser assistive device such as a cane, crutch, or standard walker. The need for this equipment was discussed with the patient and he understands and is in agreement.

## 2022-03-19 NOTE — PROGRESS NOTES
Hospitalist Progress Note      Patient:  Sally Taylor    Unit/Bed:7K-28/028-A  YOB: 1955  MRN: [id number]   Acct: [de-identified]   PCP: Bharat Arora MD  Date of Admission: 3/14/2022    Assessment/Plan:  1. Acute Postprocedural Respiratory Failure. Surgery on the lumbar spine complicated by an inability to extubate in the PACU. Therefore, patient was sent to the ICU. Patient was successfully extubated on 3/15/2022 and then sent to  on 3/16/2022. Initially required approximately 2 L of oxygen but is now saturate well on room air. Mild hypoxic respiratory failure probably secondary to atelectasis. Treated with Acapella and incentive spirometry. 2.  Acute Intraoperative Hemorrhagic Anemia.  2 L intraoperative blood loss. However, has been hemodynamically stable since. Given clear cause no need for work-up with iron studies. Follow with daily CBC. 3.  Benign Essential Hypertension. Only on lisinopril 5 mg daily as an outpatient. Recent high blood pressure probably secondary to pain. Blood pressure has been well controlled since. Management of postoperative pain as below. Increase lisinopril or add a second agent as necessary. 4.  Chronic obstructive pulmonary disease, unspecified. Is not on home oxygen. No PFTs in our records, so cannot stage. Will maintain on home tiotropium 2 puffs daily along with as needed albuterol. 5.  Status Post Lumbar Spine Decompression and Instrumentation Fusion. Complications as above. Neurosurgery following. On Colace 100 mg twice daily, Senokot 17.2 mg 2 tablets nightly, and MiraLAX 17 g daily for bowel regimen. On trazodone 50 mg nightly for sleep. On Dilaudid 1 mg every 3 hours as needed, Zanaflex 4 mg every 8 hours as needed, Percocet every 4 hours as needed, and Toradol 50 mg every 6 hours as needed for pain. On Benadryl 25 mg every 6 hours as needed for allergies.   Neurosurgery will follow. Patient was seen by Physical Medicine and Rehabilitation doctor and is awaiting pre-CERT. 6.  Leukocytosis, resolved. Secondary to recent surgery. Will monitor with daily CBC. Expected discharge date: 3/18/2022    Disposition:    [] Home       [] TCU       [x] Rehab       [] Psych       [] SNF       [] [facility]       [] Other-    Chief Complaint: Postsurgical Respiratory Insufficiency. Opening Statement:  Patient is a 81-year-old male with a past medical history of chronic obstructive pulmonary disease, bradycardia, GERD, arthritis, hyperlipidemia, hypertension, cervical spondylosis with myelopathy, and cervical fusion in 2015 and lumbar laminectomy in 2018, right cluneal nerve block in 2021, and injection in the right sacroiliac joint in 2021 who came to the hospital for a revision of previous lumbar spine decompression instrumentation and fusion with extension to the pelvis and T10. Hospitalist team is managing primarily for postoperative respiratory insufficiency. Hospital Treatment Course:   Patient had a revision of a previous lumbar spine decompression instrumentation fusion with extension to the pelvis and T10 to address postlaminectomy chronic pain syndrome. Patient was noted to have postoperative respiratory insufficiency and could not be extubated in the PACU. Therefore, he was sent to the ICU. Also lost approximately 2 L of blood in the OR. Patient was successfully extubated on 3/15/2022 and was sent to 4A on 3/16/2022. Patient is currently awake and alert. However, he is noting numbness in the dorsal and lateral aspects of the left leg, traumatic chest pain, and postsurgical back pain. Pain regimen is managed by neurosurgery. Patient she had mild acute hypoxic respiratory failure after intubation requiring 2 L but has since been saturating well on room air. Awaiting pre-CERT to inpatient rehab at [facility]     Subjective (past 24 hours):   Left-sided chest pain status post injury, postsurgical back pain, and numbness/tingling in the left thigh are all slightly improved. Otherwise, denies fever, chills, chest pain, palpitations, shortness of breath, cough, abdominal pain, nausea, dysuria, and hematuria. Otherwise, no new specific complaints or concerns. Past medical history, family history, social history and allergies reviewed again and is unchanged since admission. ROS (12 point review of systems completed. Pertinent positives noted. Otherwise ROS is negative)     Medications:  Reviewed    Infusion Medications    sodium chloride      sodium chloride       Scheduled Medications    tiotropium  2 puff Inhalation Daily    bupivacaine  30 mL IntraDERmal Once    famotidine  20 mg Oral BID    polyethylene glycol  17 g Oral Daily    docusate sodium  100 mg Oral BID    senna  2 tablet Oral Nightly    traZODone  50 mg Oral Nightly    chlorhexidine  15 mL Mouth/Throat BID    sodium chloride flush  5-40 mL IntraVENous 2 times per day    enoxaparin  40 mg SubCUTAneous Q24H    gabapentin  800 mg Oral TID    lisinopril  5 mg Oral Daily     PRN Meds: oxyCODONE-acetaminophen, HYDROmorphone, sodium chloride, tiZANidine, diphenhydrAMINE, ketorolac, sodium chloride flush, sodium chloride, ondansetron **OR** ondansetron, acetaminophen **OR** acetaminophen, albuterol      Intake/Output Summary (Last 24 hours) at 3/18/2022 2227  Last data filed at 3/18/2022 1801  Gross per 24 hour   Intake 1240 ml   Output 650 ml   Net 590 ml       Diet:  ADULT DIET; Regular    Exam:  /66   Pulse 76   Temp 97.9 °F (36.6 °C) (Oral)   Resp 18   Ht 5' 10\" (1.778 m)   Wt 238 lb 5.1 oz (108.1 kg)   SpO2 100%   BMI 34.20 kg/m²   Physical Exam  Constitutional:       General: He is not in acute distress. Appearance: He is obese. He is not ill-appearing or toxic-appearing. Comments: The patient is awake, alert, and in no acute distress. Capable of answering questions and following commands. Examination somewhat limited postsurgically. Hemovac no longer there. Tenderness to palpation over the left chest improved. Very poor dentition. HENT:      Head: Normocephalic and atraumatic. Right Ear: External ear normal.      Left Ear: External ear normal.      Mouth/Throat:      Mouth: Mucous membranes are moist.      Pharynx: Oropharynx is clear. Cardiovascular:      Rate and Rhythm: Normal rate and regular rhythm. Pulses:           Radial pulses are 2+ on the right side and 2+ on the left side. Heart sounds: Normal heart sounds. Pulmonary:      Effort: Pulmonary effort is normal. No respiratory distress. Breath sounds: Normal breath sounds. No wheezing or rales. Comments: But examination limited. Abdominal:      General: Abdomen is flat. There is no distension. Palpations: Abdomen is soft. Tenderness: There is no abdominal tenderness. There is no guarding. Musculoskeletal:      Right lower leg: No edema. Left lower leg: No edema. Skin:     General: Skin is warm and dry. Capillary Refill: Capillary refill takes less than 2 seconds. Neurological:      Mental Status: He is alert. Psychiatric:         Mood and Affect: Mood normal.         Behavior: Behavior normal.         Labs:   Recent Labs     03/16/22  1745 03/17/22  0716 03/18/22  0542   WBC 12.0* 11.3* 9.0   HGB 9.5* 9.1* 8.6*   HCT 29.2* 28.0* 26.8*    153 198     Recent Labs     03/17/22  0716      K 4.9      CO2 27   BUN 13   CREATININE 0.6   CALCIUM 8.5     No results for input(s): AST, ALT, BILIDIR, BILITOT, ALKPHOS in the last 72 hours. No results for input(s): INR in the last 72 hours. No results for input(s): Jasmyn Fredrick in the last 72 hours.     Microbiology:    Blood culture #1: No results found for: BC    Blood culture #2:No results found for: Steve Spencer    Organism:No results found for: ORG    No results found for: LABGRAM    MRSA culture only:No results found for: [facility]    Urine culture:   Lab Results   Component Value Date    LABURIN No growth-preliminary No growth  03/14/2022       Respiratory culture: No results found for: CULTRESP    Aerobic and Anaerobic :  No results found for: LABAERO  No results found for: LABANAE    Urinalysis:    No results found for: NITRU, WBCUA, BACTERIA, RBCUA, BLOODU, SPECGRAV, GLUCOSEU    Radiology:  XR CHEST (2 VW)   Final Result   1. Normal heart size. No effusion seen. Prior anterior cervical fusion. Prior thoracolumbar fusion with metallic hardware in place. 2. Moderate bibasilar atelectasis/pneumonia. 3. Overall appearance of chest has worsened somewhat since prior            **This report has been created using voice recognition software. It may contain minor errors which are inherent in voice recognition technology. **      Final report electronically signed by Dr. Zari Alberto on 3/16/2022 9:55 AM      XR CHEST PORTABLE   Final Result   Bibasilar atelectasis and/or infiltrate. This document has been electronically signed by: Heidi Johnson MD on    03/14/2022 08:06 PM      XR CHEST PORTABLE   Final Result   Bibasilar atelectasis and/or infiltrates with interval worsening. This document has been electronically signed by: Heidi Johnson MD on    03/14/2022 08:05 PM      XR LUMBAR SPINE (2-3 VIEWS)   Final Result   Impression:   1. Postsurgical changes of the lumbosacral spine. This document has been electronically signed by: Heidi Johnson MD on    03/14/2022 08:02 PM      FLUORO FOR SURGICAL PROCEDURES   Final Result      FLUORO FOR SURGICAL PROCEDURES   Final Result      XR SPINE SCOLIOSIS STANDING (1 VIEW)    (Results Pending)     XR LUMBAR SPINE (2-3 VIEWS)    Result Date: 3/14/2022  X-ray lumbar spine one view Comparison: None Findings: Single AP fluoroscopic image of the lumbosacral spine. Laminectomy defect extending from L3-S1.  Partial exam is complete. No Radiologist dictation. Please follow up with ordering provider. Support Devices (date placed):  [] ETT []Oral / [] Nasal  [] Gastric Tube [] OG / [] NG    [] Central Venous Line (Specify Site):  [] Urinary Catheter  [] Arterial Line (Specify Site)  [x] Peripheral IV access  [x] Other: Closed suction/drain inferior; right; medial back     DVT prophylaxis: [] Lovenox                                 [] SCDs                                 [] SQ Heparin                                 [] Encourage ambulation           [] Already on Anticoagulation   Being held postsurgically.      Code Status: Full Code    Tele:   [x] yes             [] no    Electronically signed by Gris Vetnura MD, MPH, [facility] on 3/18/2022 at 10:27 PM   Supervised by Dr. Trevino Neigh

## 2022-03-25 DIAGNOSIS — M62.838 OTHER MUSCLE SPASM: ICD-10-CM

## 2022-03-28 DIAGNOSIS — Z98.1 STATUS POST LUMBAR SPINAL FUSION: ICD-10-CM

## 2022-03-28 RX ORDER — TIZANIDINE 4 MG/1
TABLET ORAL
Qty: 60 TABLET | OUTPATIENT
Start: 2022-03-28

## 2022-03-28 NOTE — TELEPHONE ENCOUNTER
OARRS reviewed. UDS: + for  Gabapentin. Last seen: 11/8/2021.  Follow-up:   Future Appointments   Date Time Provider Elizabeth Nair   5/13/2022  9:00 AM DO ADEEL BerriosX Pain [facility] - [place]   7/7/2022  8:30 AM MD ADEEL BoneX Heart [place] [facility]

## 2022-03-28 NOTE — TELEPHONE ENCOUNTER
Josep Mitchell is requesting a refill of the following medication(s); Requested Prescriptions     Pending Prescriptions Disp Refills    oxyCODONE-acetaminophen (PERCOCET) 5-325 MG per tablet 56 tablet 0     Sig: Take 2 tablets by mouth every 6 hours as needed for Pain for up to 7 days.        Last filled;  3/19/2022

## 2022-03-28 NOTE — TELEPHONE ENCOUNTER
OARRS reviewed. UDS: + for Gabapentin. Last seen: 11/8/2021.  Follow-up:   Future Appointments   Date Time Provider Elizabeth Nair   5/13/2022  9:00 AM DO ADEEL DietzX Pain [facility] - [place]   7/7/2022  8:30 AM MD ADEEL AdrianX Heart [place] [facility]

## 2022-03-30 RX ORDER — OXYCODONE HYDROCHLORIDE AND ACETAMINOPHEN 5; 325 MG/1; MG/1
2 TABLET ORAL EVERY 6 HOURS PRN
Qty: 56 TABLET | Refills: 0 | Status: SHIPPED | OUTPATIENT
Start: 2022-03-30 | End: 2022-04-06

## 2022-03-31 ENCOUNTER — OFFICE VISIT (OUTPATIENT)
Dept: NEUROSURGERY | Age: 67
End: 2022-03-31

## 2022-03-31 VITALS
BODY MASS INDEX: 34.07 KG/M2 | SYSTOLIC BLOOD PRESSURE: 102 MMHG | DIASTOLIC BLOOD PRESSURE: 60 MMHG | HEIGHT: 70 IN | WEIGHT: 238 LBS

## 2022-03-31 DIAGNOSIS — G89.4 CHRONIC PAIN DISORDER: ICD-10-CM

## 2022-03-31 DIAGNOSIS — Z98.1 STATUS POST LUMBAR SPINAL FUSION: Primary | ICD-10-CM

## 2022-03-31 DIAGNOSIS — M53.3 SACROILIAC JOINT DISEASE: ICD-10-CM

## 2022-03-31 PROCEDURE — 99024 POSTOP FOLLOW-UP VISIT: CPT

## 2022-03-31 ASSESSMENT — ENCOUNTER SYMPTOMS
BACK PAIN: 1
NAUSEA: 0
CONSTIPATION: 0
SHORTNESS OF BREATH: 0
TROUBLE SWALLOWING: 0
PHOTOPHOBIA: 0
COUGH: 0
COLOR CHANGE: 0

## 2022-03-31 NOTE — PROGRESS NOTES
he is not sleeping well as he is having a difficult time getting comfortable in bed. Patient states that he is happy with his results from surgery. Patient denies any drainage, erythrema, and incision flat. Dressing is dry and intact. Review of Systems   Review of Systems   Constitutional: Negative for activity change, appetite change, fatigue and fever. HENT: Negative for trouble swallowing. Eyes: Negative for photophobia and visual disturbance. Respiratory: Negative for cough and shortness of breath. Cardiovascular: Negative for chest pain. Gastrointestinal: Negative for constipation and nausea. Genitourinary: Negative for difficulty urinating. Musculoskeletal: Positive for back pain, gait problem and myalgias. Skin: Positive for wound (surgical incision flat,staples intact, no drainage noted, no erythema). Negative for color change and rash. Neurological: Positive for numbness. Negative for dizziness and weakness. Psychiatric/Behavioral: Positive for sleep disturbance. Negative for confusion. The patient is not nervous/anxious. Medications     Current Outpatient Medications on File Prior to Visit   Medication Sig Dispense Refill    oxyCODONE-acetaminophen (PERCOCET) 5-325 MG per tablet Take 2 tablets by mouth every 6 hours as needed for Pain for up to 7 days.  56 tablet 0    docusate sodium (COLACE) 100 MG capsule Take 1 capsule by mouth 2 times daily      senna (SENOKOT) 8.6 MG tablet Take 2 tablets by mouth nightly 60 tablet 0    polyethylene glycol (GLYCOLAX) 17 g packet Take 17 g by mouth daily as needed for Constipation 527 g 1    naloxone 4 MG/0.1ML LIQD nasal spray 1 spray by Nasal route as needed for Opioid Reversal 1 each 5    albuterol sulfate HFA (VENTOLIN HFA) 108 (90 Base) MCG/ACT inhaler Inhale 2 puffs into the lungs every 4 hours as needed for Wheezing or Shortness of Breath      tiotropium (SPIRIVA) 18 MCG inhalation capsule Inhale 18 mcg into the lungs daily      tapentadol (NUCYNTA) 100 MG TABS Take 1 tablet by mouth 2 times daily as needed for Pain for up to 30 days. 60 tablet 0    tiZANidine (ZANAFLEX) 4 MG tablet Take 1 tablet by mouth every 8 hours as needed (muscle spasms) 270 tablet 0    gabapentin (NEURONTIN) 800 MG tablet TAKE 1 TABLET BY MOUTH THREE TIMES A DAY 90 tablet 2    lisinopril (PRINIVIL;ZESTRIL) 5 MG tablet Take 5 mg by mouth daily      aspirin 81 MG chewable tablet Take 1 tablet by mouth daily 30 tablet 3     No current facility-administered medications on file prior to visit. Past History    Past Medical History:   has a past medical history of Anxiety, Arthritis, Bradycardia, Cervical spondylosis with myelopathy, COPD (chronic obstructive pulmonary disease) ([facility]), Depression, GERD (gastroesophageal reflux disease), Headache(784.0), Hyperlipidemia, Hypertension, Low back pain, Prolonged emergence from general anesthesia, Snoring, and Tobacco abuse. Social History:   reports that he has been smoking cigarettes. He has a 24.00 pack-year smoking history. He has never used smokeless tobacco. He reports current alcohol use. He reports that he does not use drugs. Family History:   Family History   Problem Relation Age of Onset    High Blood Pressure Mother     High Blood Pressure Father     Heart Disease Father         CAD    Heart Surgery Father 61        CAGB   [place] Current COPD Sister     Emphysema Sister     Cancer Sister         Throat CA/bone cancer       Physical Examination      Vitals:  /60 (Site: Right Upper Arm, Position: Sitting, Cuff Size: Large Adult)   Ht 5' 10\" (1.778 m)   Wt 238 lb (108 kg)   BMI 34.15 kg/m²       Physical Exam  Constitutional:       Appearance: Normal appearance. He is not ill-appearing. HENT:      Nose: Nose normal.      Mouth/Throat:      Mouth: Mucous membranes are moist.   Eyes:      Pupils: Pupils are equal, round, and reactive to light.    Cardiovascular:      Rate and Rhythm: Normal rate.      Pulses: Normal pulses. Pulmonary:      Effort: Pulmonary effort is normal.   Abdominal:      General: Bowel sounds are normal.   Musculoskeletal:         General: Tenderness present. Cervical back: Normal range of motion. Thoracic back: Spasms and tenderness present. Lumbar back: Spasms and tenderness present. Back:    Skin:     General: Skin is warm and dry. Findings: No erythema. Neurological:      Mental Status: He is alert and oriented to person, place, and time. Sensory: Sensory deficit present. Motor: No weakness. Psychiatric:         Mood and Affect: Mood normal.         Behavior: Behavior normal.         Thought Content: Thought content normal.         Judgment: Judgment normal.       Neurologic Exam     Mental Status   Oriented to person, place, and time. Cranial Nerves     CN III, IV, VI   Pupils are equal, round, and reactive to light. Imaging   Imaging last 30 days:  XR CHEST (2 VW)    Result Date: 3/16/2022  1. Normal heart size. No effusion seen. Prior anterior cervical fusion. Prior thoracolumbar fusion with metallic hardware in place. 2. Moderate bibasilar atelectasis/pneumonia. 3. Overall appearance of chest has worsened somewhat since prior **This report has been created using voice recognition software. It may contain minor errors which are inherent in voice recognition technology. ** Final report electronically signed by Dr. Prachi Dooley on 3/16/2022 9:55 AM    XR LUMBAR SPINE (2-3 VIEWS)    Result Date: 3/14/2022  Impression: 1. Postsurgical changes of the lumbosacral spine. This document has been electronically signed by: James Zelaya MD on 03/14/2022 08:02 PM    XR CHEST PORTABLE    Result Date: 3/14/2022  Bibasilar atelectasis and/or infiltrate.  This document has been electronically signed by: James Zelaya MD on 03/14/2022 08:06 PM    XR CHEST PORTABLE    Result Date: 3/14/2022  Bibasilar atelectasis and/or

## 2022-04-04 ENCOUNTER — TELEPHONE (OUTPATIENT)
Dept: NEUROSURGERY | Age: 67
End: 2022-04-04

## 2022-04-08 DIAGNOSIS — Z98.1 STATUS POST LUMBAR SPINAL FUSION: ICD-10-CM

## 2022-04-08 NOTE — TELEPHONE ENCOUNTER
[facility] is requesting a refill of the following medication(s); Requested Prescriptions     Pending Prescriptions Disp Refills    oxyCODONE-acetaminophen (PERCOCET) 5-325 MG per tablet 56 tablet 0     Sig: Take 2 tablets by mouth every 6 hours as needed for Pain for up to 7 days.        Last filled;  3/30/22

## 2022-04-13 DIAGNOSIS — Z98.1 STATUS POST LUMBAR SPINAL FUSION: Primary | ICD-10-CM

## 2022-04-13 RX ORDER — OXYCODONE HYDROCHLORIDE AND ACETAMINOPHEN 5; 325 MG/1; MG/1
2 TABLET ORAL EVERY 6 HOURS PRN
Qty: 56 TABLET | Refills: 0 | Status: SHIPPED | OUTPATIENT
Start: 2022-04-13 | End: 2022-04-20

## 2022-04-13 NOTE — TELEPHONE ENCOUNTER
Patient called stating he has been experiencing pain and burning sensation since surgery a few weeks ago. Currently out of medication and tylenol is not helping with pain. Please advise.

## 2022-04-16 RX ORDER — OXYCODONE HYDROCHLORIDE AND ACETAMINOPHEN 5; 325 MG/1; MG/1
2 TABLET ORAL EVERY 6 HOURS PRN
Qty: 56 TABLET | Refills: 0 | OUTPATIENT
Start: 2022-04-16 | End: 2022-04-23

## 2022-04-25 ENCOUNTER — HOSPITAL ENCOUNTER (EMERGENCY)
Age: 67
Discharge: HOME | End: 2022-04-25
Attending: EMERGENCY MEDICINE
Payer: MEDICARE

## 2022-04-25 ENCOUNTER — APPOINTMENT (OUTPATIENT)
Dept: MRI IMAGING | Age: 67
End: 2022-04-25
Payer: MEDICARE

## 2022-04-25 VITALS
HEART RATE: 60 BPM | WEIGHT: 236 LBS | HEIGHT: 70 IN | OXYGEN SATURATION: 100 % | TEMPERATURE: 97.6 F | RESPIRATION RATE: 18 BRPM | SYSTOLIC BLOOD PRESSURE: 145 MMHG | BODY MASS INDEX: 33.79 KG/M2 | DIASTOLIC BLOOD PRESSURE: 84 MMHG

## 2022-04-25 DIAGNOSIS — Z98.1 STATUS POST LUMBAR SPINAL FUSION: Primary | ICD-10-CM

## 2022-04-25 DIAGNOSIS — M54.50 ACUTE EXACERBATION OF CHRONIC LOW BACK PAIN: Primary | ICD-10-CM

## 2022-04-25 DIAGNOSIS — G89.29 ACUTE EXACERBATION OF CHRONIC LOW BACK PAIN: Primary | ICD-10-CM

## 2022-04-25 LAB
C-REACTIVE PROTEIN: < 0.3 MG/DL (ref 0–1)
SEDIMENTATION RATE, ERYTHROCYTE: 10 MM/HR (ref 0–10)

## 2022-04-25 PROCEDURE — 99284 EMERGENCY DEPT VISIT MOD MDM: CPT

## 2022-04-25 PROCEDURE — 86140 C-REACTIVE PROTEIN: CPT

## 2022-04-25 PROCEDURE — 85651 RBC SED RATE NONAUTOMATED: CPT

## 2022-04-25 PROCEDURE — 6360000002 HC RX W HCPCS: Performed by: STUDENT IN AN ORGANIZED HEALTH CARE EDUCATION/TRAINING PROGRAM

## 2022-04-25 PROCEDURE — 72146 MRI CHEST SPINE W/O DYE: CPT

## 2022-04-25 PROCEDURE — 72148 MRI LUMBAR SPINE W/O DYE: CPT

## 2022-04-25 PROCEDURE — 96374 THER/PROPH/DIAG INJ IV PUSH: CPT

## 2022-04-25 RX ADMIN — HYDROMORPHONE HYDROCHLORIDE 0.5 MG: 1 INJECTION, SOLUTION INTRAMUSCULAR; INTRAVENOUS; SUBCUTANEOUS at 01:54

## 2022-04-25 ASSESSMENT — ENCOUNTER SYMPTOMS
EYE REDNESS: 0
DIARRHEA: 0
RHINORRHEA: 0
VOMITING: 0
ABDOMINAL PAIN: 0
NAUSEA: 0
SHORTNESS OF BREATH: 0
SORE THROAT: 0
SINUS PAIN: 0
COUGH: 0
BACK PAIN: 1

## 2022-04-25 ASSESSMENT — PAIN SCALES - GENERAL
PAINLEVEL_OUTOF10: 8
PAINLEVEL_OUTOF10: 8

## 2022-04-25 ASSESSMENT — PAIN - FUNCTIONAL ASSESSMENT
PAIN_FUNCTIONAL_ASSESSMENT: 0-10
PAIN_FUNCTIONAL_ASSESSMENT: 0-10

## 2022-04-25 ASSESSMENT — PAIN DESCRIPTION - LOCATION: LOCATION: BACK

## 2022-04-25 NOTE — ED PROVIDER NOTES
I performed a history and physical examination of the patient and discussed management with the resident. I reviewed the residents note and agree with the documented findings and plan of care. Any areas of disagreement are noted on the chart. I was personally present for the key portions of any procedures. I have documented in the chart those procedures where I was not present during the key portions. I have reviewed the emergency nurses triage note. I agree with the chief complaint, past medical history, past surgical history, allergies, medications, social and family history as documented unless otherwise noted below. Documentation of the HPI, Physical Exam and Medical Decision Making performed by medical students or scribes is based on my personal performance of the HPI, PE and MDM. For Phys Assistant/ Nurse Practitioner cases/documentation I have personally evaluated this patient and have completed at least one if not all key elements of the E/M (history, physical exam, and MDM). My findings are as noted below      The patient presents with worsening low back pain. Apparently the patient approximately 3 weeks ago had surgery on the lumbar spine. He states he had a fall. He has worsening right-sided leg weakness, he has bilateral leg numbness. He was seen at [address] [place] [address] today and they called and spoke with the neurosurgeon who wanted him transferred here and an MRI performed. Here today the patient is complaining of pain and problems. Patient does have a history of spinal stenosis of the lumbar region with neurogenic claudication. Patient states that the surgery went well. If he had had a fall he felt like this would have been uneventful. Patient is otherwise resting comfortably on the cot no apparent distress no other physical complaints at this time. Patient does intimated to me that he has been having this weakness since the surgery.   However his right leg was the one that was involved prior to the surgery. Patient denies any loss of bowel or bladder function. He has no saddle anesthesia or paresthesia. He does tell me that he has difficulty walking but this is not changed. Patient is otherwise resting comfortably on the cot no apparent distress no other physical complaints at this time. I reviewed all labs from [facility] [place] all within normal limits. MRI THORACIC SPINE WO CONTRAST   Final Result   Impression:   No acute findings. Mild multilevel degenerative change. This document has been electronically signed by: Solomon Concepcion. Awa Oconnell MD    on 04/25/2022 03:17 AM      MRI LUMBAR SPINE WO CONTRAST   Final Result   Impression:   No acute findings. No severe stenosis. Multilevel stenosis, detailed above. This document has been electronically signed by: Solomon Concepcion. Awa Oconnell MD    on 04/25/2022 03:40 AM        Labs Reviewed   C-REACTIVE PROTEIN   SEDIMENTATION RATE         Final diagnoses:   Acute exacerbation of chronic low back pain   . I have seen this patient with the resident Dr. Desiree Funes and agree with his assessment and plan.       Bria Cisneros DO  04/25/22 [id number]

## 2022-04-25 NOTE — ED NOTES
ED nurse-to-nurse bedside report    Chief Complaint   Patient presents with    Back Pain      LOC: alert and orientated to name, place, date  Vital signs   Vitals:    04/25/22 0102   BP: (!) 146/82   Pulse: 61   Resp: 18   Temp: 97.6 °F (36.4 °C)   TempSrc: Oral   SpO2: 97%   Weight: 236 lb (107 kg)   Height: 5' 10\" (1.778 m)      Pain:    Pain Interventions: none  Pain Goal: NA  Oxygen: No    Current needs required RA   Telemetry: No  LDAs:    Continuous Infusions:   Mobility: Independent  Pal Fall Risk Score: No flowsheet data found.   Fall Interventions: side rails up, call light in reach wheels locked  Report given to: Myla Murillo, CONSUELO  04/25/22 4161

## 2022-04-25 NOTE — ED NOTES
RN medicated pt per [facility] at this time and MRI is at bedside to transfer pt off the floor to MRI scan room.       Hope Geronimo RN  04/25/22 7631

## 2022-04-25 NOTE — ED PROVIDER NOTES
[facility] ENCOUNTER          Pt Name: Holly Fabian  MRN: [id number]  [place] 1955  Date of evaluation: 4/25/2022  Treating Resident Physician: Pratima Briggs MD  Supervising Physician: Dr Kathy Estevez       Chief Complaint   Patient presents with    Back Pain     History obtained from the patient. HISTORY OF PRESENT ILLNESS    HPI  Holly Fabian is a 61 y.o. male with past medical history of chronic back pain, dyslipidemia, headaches, bradycardia, GERD, depression, hyperlipidemia, hypertension, COPD who presents to the emergency department for evaluation of lower back pain. Patient underwent a lumbar fusion on March 14 2022 with Dr. Jc Leach since then has had increasing pain that radiates down his left leg intermittently. He also had a fall few weeks ago worsening his pain, since [facility] underwent imaging which showed no acute fracture. He was seen at outlying facility today due to worsening pain and numbness rating down his legs last couple days. He was sent here for the evaluation for potential MRI. Patient describes having worsening numbness in his left lower extremity over the last couple days and worsening weakness in his right lower extremity. However describes his pain being stable since the procedure. Denies any fecal or urine incontinence denies any fever or chills. The patient has no other acute complaints at this time. REVIEW OF SYSTEMS   Review of Systems   Constitutional: Negative for chills and fever. HENT: Negative for rhinorrhea, sinus pain and sore throat. Eyes: Negative for redness. Respiratory: Negative for cough and shortness of breath. Cardiovascular: Negative for chest pain. Gastrointestinal: Negative for abdominal pain, diarrhea, nausea and vomiting. Genitourinary: Negative for dysuria. Musculoskeletal: Positive for back pain. Skin: Negative for rash. Neurological: Positive for weakness and numbness. Negative for light-headedness and headaches. Psychiatric/Behavioral: Negative for agitation. PAST MEDICAL AND SURGICAL HISTORY     Past Medical History:   Diagnosis Date    Anxiety     Arthritis     Bradycardia     HR 50 on preop EKG    Cervical spondylosis with myelopathy     COPD (chronic obstructive pulmonary disease) (HCC)     breathing worse in the heat    Depression     GERD (gastroesophageal reflux disease)     Headache(784.0)     Hyperlipidemia     Hypertension     Low back pain     was on Xanax from pain clinic - no longer has script    Prolonged emergence from general anesthesia     Snoring     no apnea, BMI 27, neck circ. 15 inches, will wake coughing, no choking, no daytime somnolence    Tobacco abuse      Past Surgical History:   Procedure Laterality Date    BACK INJECTION Right 8/3/2021    right sacroiliac joint injection performed by Sánchez Allen DO at [address]  11/2015    cervical fusion C3-5? Dr. Meli Duke Bilateral     Dr. Josy Dutton  2014   [facility] N/A 3/14/2022    Revision previous lumbar spine decompression instrumentation & fusion with extension to the pelvis T10 and pelvis performed by Pepito Farris MD at [facility] Right 9/28/2021    right cluneal nerve block performed by Sánchez Allen DO at [address] [place] [address] UNLISTED N/A 4/18/2018    LUMBAR LAMINECTOMY WITH PSF L2-S1, PLIF L5-S1 WITH SOLERA 5.5 CAPSTONE AND OPAL [place] performed by Belinda Vega MD at [facility] Left 01/2016    Dr. Jessika Kulkarni @ [place] [address]   No current facility-administered medications for this encounter.     Current Outpatient Medications:     docusate sodium (COLACE) 100 MG capsule, Take 1 capsule by mouth 2 times daily, Disp: , Rfl:     naloxone 4 MG/0.1ML LIQD nasal spray, 1 spray by Nasal route as needed for Opioid Reversal, Disp: 1 each, Rfl: 5    albuterol sulfate HFA (VENTOLIN HFA) 108 (90 Base) MCG/ACT inhaler, Inhale 2 puffs into the lungs every 4 hours as needed for Wheezing or Shortness of Breath, Disp: , Rfl:     tiotropium (SPIRIVA) 18 MCG inhalation capsule, Inhale 18 mcg into the lungs daily, Disp: , Rfl:     tiZANidine (ZANAFLEX) 4 MG tablet, Take 1 tablet by mouth every 8 hours as needed (muscle spasms), Disp: 270 tablet, Rfl: 0    gabapentin (NEURONTIN) 800 MG tablet, TAKE 1 TABLET BY MOUTH THREE TIMES A DAY, Disp: 90 tablet, Rfl: 2    lisinopril (PRINIVIL;ZESTRIL) 5 MG tablet, Take 5 mg by mouth daily, Disp: , Rfl:     aspirin 81 MG chewable tablet, Take 1 tablet by mouth daily, Disp: 30 tablet, Rfl: 3      SOCIAL HISTORY     Social History     Social History Narrative    Not on file     Social History     Tobacco Use    Smoking status: Current Every Day Smoker     Packs/day: 0.50     Years: 48.00     Pack years: 24.00     Types: Cigarettes    Smokeless tobacco: Never Used   Vaping Use    Vaping Use: Never used   Substance Use Topics    Alcohol use: Yes     Alcohol/week: 0.0 standard drinks     Comment: occasionnally    Drug use: No         ALLERGIES     Allergies   Allergen Reactions    Norco [Hydrocodone-Acetaminophen] Hives and Itching         FAMILY HISTORY     Family History   Problem Relation Age of Onset    High Blood Pressure Mother     High Blood Pressure Father     Heart Disease Father         CAD    Heart Surgery Father 61        CAGB   [place] Self COPD Sister     Emphysema Sister     Cancer Sister         Throat CA/bone cancer         PREVIOUS RECORDS   Previous records reviewed: Patient seen on 3/20/2022 for vasovagal episode.       PHYSICAL EXAM     ED Triage Vitals [04/25/22 0102]   BP Temp Temp Source Pulse Resp SpO2 Height Weight   -- 97.6 °F (36.4 °C) Oral 61 18 97 % 5' 10\" (1.778 m) 236 lb (107 kg)     Initial vital signs and nursing assessment reviewed and normal. Pulsoximetry is normal per my interpretation. Additional Vital Signs:  Vitals:    04/25/22 0318   BP: (!) 145/84   Pulse: 60   Resp: 18   Temp:    SpO2: 100%       Physical Exam  Vitals and nursing note reviewed. Constitutional:       Appearance: He is not toxic-appearing. HENT:      Head: Normocephalic and atraumatic. Right Ear: External ear normal.      Left Ear: External ear normal.      Nose: Nose normal.      Mouth/Throat:      Mouth: Mucous membranes are moist.      Pharynx: Oropharynx is clear. Eyes:      General: No scleral icterus. Conjunctiva/sclera: Conjunctivae normal.   Cardiovascular:      Rate and Rhythm: Normal rate and regular rhythm. Pulses: Normal pulses. Heart sounds: Normal heart sounds. Pulmonary:      Effort: Pulmonary effort is normal. No respiratory distress. Breath sounds: Normal breath sounds. Abdominal:      General: Abdomen is flat. There is no distension. Palpations: Abdomen is soft. Tenderness: There is no abdominal tenderness. There is no guarding or rebound. Musculoskeletal:      Cervical back: Normal range of motion and neck supple. No rigidity. No muscular tenderness. Lymphadenopathy:      Cervical: No cervical adenopathy. Skin:     General: Skin is warm and dry. Capillary Refill: Capillary refill takes less than 2 seconds. Coloration: Skin is not jaundiced. Neurological:      Mental Status: He is alert and oriented to person, place, and time. Sensory: Sensory deficit present. Motor: Weakness present.       Comments: Sensation discrepancies in the left lower extremity when compared with right lower extremity, weakness in the right lower extremity strength 3/5 compared to the left lower extremity strength 4/5   Psychiatric:         Mood and Affect: Mood normal.         Behavior: Behavior normal.           MEDICAL DECISION MAKING   Initial Assessment: This is 72-year-old male who has chronic back problems who had a lumbar fusion with Dr. Alexsander Musa March 14, 2022, specific and pains of since incident, had a fall few weeks ago underwent imaging in [facility] showed no acute fracture. He was in the joint [facility] today after of having worsening numbness as obstruction when he had weakness in his right lower extremity. There is sent here for further evaluation potential MRI. Differential Diagnosis Included but not limited to: Cauda equina, spinal epidural abscess, Chronic back pain, radiculopathy, neuropraxia    MDM:   Patient is MRI of the thoracic and lumbar spine showed no acute changes. He has been having having acute on chronic exacerbation. After further discussion patient does admit that he has had some similar symptoms of numbness and weakness since his actual surgery and no real significant changes have been made over last couple days that he initially mention. He will be discharged, advised to take his home medications and follow-up with his specialist palpation. ED RESULTS   Laboratory results:  Labs Reviewed   C-REACTIVE PROTEIN   SEDIMENTATION RATE       Radiologic studies results:  MRI THORACIC SPINE WO CONTRAST   Final Result   Impression:   No acute findings. Mild multilevel degenerative change. This document has been electronically signed by: Amparo Contreras. Chante Hamilton MD    on 04/25/2022 03:17 AM      MRI LUMBAR SPINE WO CONTRAST   Final Result   Impression:   No acute findings. No severe stenosis. Multilevel stenosis, detailed above. This document has been electronically signed by: Amparo Contreras.  Chante Hamilton MD    on 04/25/2022 03:40 AM          ED Medications administered this visit:   Medications   HYDROmorphone (DILAUDID) injection 0.5 mg (0.5 mg IntraVENous Given 4/25/22 0154)         ED COURSE     ED Course as of 04/25/22 0344   Mon Apr 25, 2022   0135 [facility] was contacted for MRI approval and agreed to approve the MRI. MRI thoracic and MRI lumbar spine was ordered. [AL]   0330 Sed Rate: 10 [AL]   0330 CRP: < 0.30 [AL]   0330 MRI THORACIC SPINE WO CONTRAST  \"   IMPRESSION:  Impression:  No acute findings. Mild multilevel degenerative change\" [AL]   4436 MRI LUMBAR SPINE WO CONTRAST  \"IMPRESSION:  Impression:  No acute findings. No severe stenosis. Multilevel stenosis, detailed above.   \" [AL]      ED Course User Index  [AL] Yohannes Ruiz MD     Strict return precautions and follow up instructions were discussed with the patient prior to discharge, with which the patient agrees. MEDICATION CHANGES     New Prescriptions    No medications on file         FINAL DISPOSITION     Final diagnoses:   Acute exacerbation of chronic low back pain     Condition: condition: good  Dispo: Discharge to home      This transcription was electronically signed. Parts of this transcriptions may have been dictated by use of voice recognition software and electronically transcribed, and parts may have been transcribed with the assistance of an ED scribe. The transcription may contain errors not detected in proofreading. Please refer to my supervising physician's documentation if my documentation differs.     Electronically Signed: Yohannes Ruiz MD, 04/25/22, 3:44 AM         Yohannes Ruiz MD  Resident  04/25/22 9961

## 2022-04-25 NOTE — TELEPHONE ENCOUNTER
Vianey Killian is requesting a refill of the following medication(s);   Requested Prescriptions      No prescriptions requested or ordered in this encounter       Last filled; 4/13/22

## 2022-04-25 NOTE — ED TRIAGE NOTES
Pt presents to the ED via EMS from [facility] with c/o back pain. Pt states he has had multiple back surgeries, the last being in March. Pt states he has been having back pain ever since the surgery but the pain increased around 2 days ago. Pt states no medications have helped. EMS reports pt received fentanyl pta. Pt states pain has not decreased since. EMS states pt is being transferred to get a MRI. RR easy and unlabored.

## 2022-04-25 NOTE — ED NOTES
Pt is back from MRI, Pt is getting clothes back on at this time. Pt voices no concern or need at this time. Call light is within reach. Will continue to monitor.       [facility]  04/25/22 7137

## 2022-04-26 RX ORDER — OXYCODONE HYDROCHLORIDE AND ACETAMINOPHEN 5; 325 MG/1; MG/1
2 TABLET ORAL EVERY 6 HOURS PRN
Qty: 56 TABLET | Refills: 0 | Status: SHIPPED | OUTPATIENT
Start: 2022-04-26 | End: 2022-05-03

## 2022-04-28 ENCOUNTER — OFFICE VISIT (OUTPATIENT)
Dept: NEUROSURGERY | Age: 67
End: 2022-04-28

## 2022-04-28 VITALS
HEART RATE: 60 BPM | BODY MASS INDEX: 29.49 KG/M2 | WEIGHT: 206 LBS | DIASTOLIC BLOOD PRESSURE: 70 MMHG | HEIGHT: 70 IN | SYSTOLIC BLOOD PRESSURE: 130 MMHG

## 2022-04-28 DIAGNOSIS — Z98.1 STATUS POST LUMBAR SPINAL FUSION: Primary | ICD-10-CM

## 2022-04-28 PROCEDURE — 99024 POSTOP FOLLOW-UP VISIT: CPT

## 2022-04-28 ASSESSMENT — ENCOUNTER SYMPTOMS
CONSTIPATION: 0
SHORTNESS OF BREATH: 0
NAUSEA: 0
TROUBLE SWALLOWING: 0
COLOR CHANGE: 0
COUGH: 0

## 2022-04-28 NOTE — PROGRESS NOTES
Neurosurgery Follow up Note    Date:4/28/2022         Patient Name:Woodrow Diez     YOB: 1955     Age:66 y.o. Reason for Follow up: Follow up for 4 week follow up       Chief Complaint:   Chief Complaint   Patient presents with    Follow-up     low back pain, numbness in left leg        Subjective   Rae Jones  Is a 61year old male who present to the office today with alone ambulating with a cane. Patient is wearing his abdominal binder. Patient underwent surgery by Dr. Alexsander Musa for reexploration and revision of lumbar fusion with extension to pelvis and T10 on 3/14/2022. Patient presents today for incisional site check. Patient stated his lumbar pain today is a 7 at its worst and a 5 on average. Patient still has numbness and tingling in his left and right thigh. Patient stated that it continues to get better every day. Patient is complaining of nerve pain in his left abdomen. Patient stated that the gabapentin he has on helps with status. Patient stated that he is doing well overall. Patient stated that he is continuing to do his therapy exercises that were provided him from home health. Patient stated that he is not sleeping well but it is better since his last appointment. .  Patient denies fever, fatigue, and weakness. Patient denies drainage from his surgical incision, no erythremia, and tenderness. Patient is happy with the progress he has made . Patient denies any recent fall or trauma. Review of Systems   Review of Systems   Constitutional: Negative for activity change, appetite change and fatigue. HENT: Negative for trouble swallowing. Eyes: Negative for visual disturbance. Respiratory: Negative for cough and shortness of breath. Cardiovascular: Negative for chest pain. Gastrointestinal: Negative for constipation and nausea. Genitourinary: Negative for difficulty urinating. Musculoskeletal: Positive for gait problem and myalgias.    Skin: Negative for color change, rash and wound (well healed surgical incision). Neurological: Positive for numbness. Negative for dizziness and weakness. Psychiatric/Behavioral: Positive for sleep disturbance. Negative for confusion. The patient is not nervous/anxious. Medications     Current Outpatient Medications on File Prior to Visit   Medication Sig Dispense Refill    oxyCODONE-acetaminophen (PERCOCET) 5-325 MG per tablet Take 2 tablets by mouth every 6 hours as needed for Pain for up to 7 days. Take lowest dose possible to manage pain 56 tablet 0    docusate sodium (COLACE) 100 MG capsule Take 1 capsule by mouth 2 times daily      naloxone 4 MG/0.1ML LIQD nasal spray 1 spray by Nasal route as needed for Opioid Reversal 1 each 5    albuterol sulfate HFA (VENTOLIN HFA) 108 (90 Base) MCG/ACT inhaler Inhale 2 puffs into the lungs every 4 hours as needed for Wheezing or Shortness of Breath      tiotropium (SPIRIVA) 18 MCG inhalation capsule Inhale 18 mcg into the lungs daily      tiZANidine (ZANAFLEX) 4 MG tablet Take 1 tablet by mouth every 8 hours as needed (muscle spasms) 270 tablet 0    lisinopril (PRINIVIL;ZESTRIL) 5 MG tablet Take 5 mg by mouth daily      aspirin 81 MG chewable tablet Take 1 tablet by mouth daily 30 tablet 3    gabapentin (NEURONTIN) 800 MG tablet TAKE 1 TABLET BY MOUTH THREE TIMES A DAY 90 tablet 2     No current facility-administered medications on file prior to visit. Past History    Past Medical History:   has a past medical history of Anxiety, Arthritis, Bradycardia, Cervical spondylosis with myelopathy, COPD (chronic obstructive pulmonary disease) (HCC), Depression, GERD (gastroesophageal reflux disease), Headache(784.0), Hyperlipidemia, Hypertension, Low back pain, Prolonged emergence from general anesthesia, Snoring, and Tobacco abuse. Social History:   reports that he has been smoking cigarettes. He has a 24.00 pack-year smoking history.  He has never used smokeless tobacco. He reports current alcohol use. He reports that he does not use drugs. Family History:   Family History   Problem Relation Age of Onset    High Blood Pressure Mother     High Blood Pressure Father     Heart Disease Father         CAD    Heart Surgery Father 61        ELIZ Diego COPD Sister     Emphysema Sister     Cancer Sister         Throat CA/bone cancer       Physical Examination      Vitals:  /70 (Site: Right Upper Arm, Position: Sitting, Cuff Size: Large Adult)   Pulse 60   Ht 5' 10\" (1.778 m)   Wt 206 lb (93.4 kg)   BMI 29.56 kg/m²       Physical Exam  Constitutional:       Appearance: Normal appearance. He is not ill-appearing. HENT:      Nose: Nose normal.      Mouth/Throat:      Mouth: Mucous membranes are moist.   Eyes:      Pupils: Pupils are equal, round, and reactive to light. Cardiovascular:      Rate and Rhythm: Normal rate. Pulses: Normal pulses. Pulmonary:      Effort: Pulmonary effort is normal.   Abdominal:      General: Bowel sounds are normal.   Musculoskeletal:         General: No tenderness. Cervical back: Normal range of motion. Skin:     General: Skin is warm and dry. Findings: No erythema. Neurological:      Mental Status: He is alert and oriented to person, place, and time. Sensory: Sensory deficit present. Motor: No weakness. Psychiatric:         Mood and Affect: Mood normal.         Behavior: Behavior normal.         Thought Content: Thought content normal.         Judgment: Judgment normal.       Neurologic Exam     Mental Status   Oriented to person, place, and time. Cranial Nerves     CN III, IV, VI   Pupils are equal, round, and reactive to light. Imaging   Imaging last 30 days:  MRI THORACIC SPINE WO CONTRAST    Result Date: 4/25/2022  Impression: No acute findings. Mild multilevel degenerative change. This document has been electronically signed by: Tracey Moreno.  Ritesh Burns MD on 04/25/2022 03:17 AM    MRI LUMBAR SPINE WO CONTRAST    Result Date: 4/25/2022  Impression: No acute findings. No severe stenosis. Multilevel stenosis, detailed above. This document has been electronically signed by: Omar Nageotte. Aissatou Kaur MD on 04/25/2022 03:40 AM         Assessment and Plan:          1.  4 week follow up   2. CT scan of the lumbar spine without contrast in 3 months to ascertain progress towards fusion and to rule out any significant compromise to his hardware with increased activity  3. Patient doing well  4. Ok to wean of of your brace  5. Continue home health PT/OT  6. Fall precautions  7. Continue to ambulate with cane  8. Encouraged gradual increase in physical and mental activity  9. Follow up in 3 months. 10. Call office is symptoms worsen or fail to improve  11. All patient questions answered. Pt voiced understanding.  Patient instructed to call the office with any questions or concerns      Electronically signed by FALLON Perez CNP on 4/28/22 at 9:06 AM EDT

## 2022-05-13 ENCOUNTER — OFFICE VISIT (OUTPATIENT)
Dept: PHYSICAL MEDICINE AND REHAB | Age: 67
End: 2022-05-13
Payer: MEDICARE

## 2022-05-13 VITALS
HEIGHT: 70 IN | WEIGHT: 206 LBS | SYSTOLIC BLOOD PRESSURE: 150 MMHG | DIASTOLIC BLOOD PRESSURE: 78 MMHG | BODY MASS INDEX: 29.49 KG/M2

## 2022-05-13 DIAGNOSIS — M62.838 OTHER MUSCLE SPASM: ICD-10-CM

## 2022-05-13 DIAGNOSIS — M96.1 FAILED BACK SURGICAL SYNDROME: ICD-10-CM

## 2022-05-13 DIAGNOSIS — M62.838 SPASM OF MUSCLE: ICD-10-CM

## 2022-05-13 DIAGNOSIS — M79.2 NEUROPATHIC PAIN: Primary | ICD-10-CM

## 2022-05-13 DIAGNOSIS — G89.29 OTHER CHRONIC PAIN: ICD-10-CM

## 2022-05-13 PROCEDURE — G8427 DOCREV CUR MEDS BY ELIG CLIN: HCPCS | Performed by: ANESTHESIOLOGY

## 2022-05-13 PROCEDURE — G8417 CALC BMI ABV UP PARAM F/U: HCPCS | Performed by: ANESTHESIOLOGY

## 2022-05-13 PROCEDURE — 4004F PT TOBACCO SCREEN RCVD TLK: CPT | Performed by: ANESTHESIOLOGY

## 2022-05-13 PROCEDURE — 99214 OFFICE O/P EST MOD 30 MIN: CPT | Performed by: ANESTHESIOLOGY

## 2022-05-13 PROCEDURE — 3017F COLORECTAL CA SCREEN DOC REV: CPT | Performed by: ANESTHESIOLOGY

## 2022-05-13 RX ORDER — TIZANIDINE 2 MG/1
2 TABLET ORAL 3 TIMES DAILY PRN
Qty: 90 TABLET | Refills: 2 | Status: SHIPPED | OUTPATIENT
Start: 2022-05-13 | End: 2022-09-11 | Stop reason: SDUPTHER

## 2022-05-13 RX ORDER — GABAPENTIN 600 MG/1
1200 TABLET ORAL 3 TIMES DAILY
Qty: 180 TABLET | Refills: 2 | Status: SHIPPED | OUTPATIENT
Start: 2022-05-13 | End: 2022-08-26 | Stop reason: SDUPTHER

## 2022-05-13 RX ORDER — TIZANIDINE 4 MG/1
4 TABLET ORAL EVERY 8 HOURS PRN
Qty: 270 TABLET | Refills: 0 | Status: SHIPPED | OUTPATIENT
Start: 2022-05-13 | End: 2022-07-05 | Stop reason: SDUPTHER

## 2022-05-13 NOTE — PROGRESS NOTES
Chronic Pain/PM&R Clinic Note     Encounter Date: 5/13/22    Subjective:   Chief Complaint:   Chief Complaint   Patient presents with    Follow-up     left thigh pain, burning        History of Present Illness:   Jahaira Ni is a 61 y.o. male seen in the clinic initially on 07/19/21 upon request from 2020 Alena Koo MD for his history of chronic low back pain. He has a medical history of previous L2-S1 lumbar decompression/fusion by Dr. Hawa Chanel in 2018, COPD, and anxiety/depression. He has been dealing with chronic low back and right-sided buttocks pain since his back surgery in 2018. He describes his pain to be a constant 9/10 stabbing, pins/needles, burning pain. States that the buttocks pain will radiate to the lateral aspect of the hip and down the lateral aspect and posterior aspect of the thigh. He states his pain is worse with any activity where he is upright walking. His pain is improved with rest and medications. He feels like the right leg is weaker than the left but denies any associated numbness/tingling, saddle anesthesia, bowel/bladder incontinence. He states that he has seen previous pain management (Dr. Damian Murphy). He states that he had a couple different injections including SI joint injection, lumbar epidural steroid injections, and facet injections. He feels like ejections were not helping him out much. Today, 5/13/2022, patient presents for planned follow-up for chronic low back pain. Overall, he states that he is recovering from his surgery. He underwent a lumbar revision surgery with a T10 to pelvis extension fusion with Dr. Aurora Andrews on 3/14/2022. He reports doing well with his recovery from this. He continues to have left-sided leg and thoracic spine burning type pain. He has been working through therapy and states he has been signed off on his physical therapy at this point. He continues to take his medications as prescribed.   He has been taking gabapentin 800 mg 3 times daily and Zanaflex 4 mg 3 times daily. He is wondering about making any med adjustments at this point. History of Interventions:   Surgery: Previous L2-S1 decompression/fusion in 2018 (Dr. Rekha Downs); Revision T10-pelvis fusion (Dr. Dmitriy Lea) in 3/2022  Injections: Multiple in past (Dr. Roslyn German)  R SI joint inj (8/3/21) - no relief  R cluneal nerve block (9/28/21) - no relief    Current Treatment Medications:   Gabapentin 1200 mg TID  Tizanidine 6 mg TID PRN    Historical Treatment Medications:   Norco - hives, itching  Tramadol - ineffective  Naproxen - ineffective   Lyrica - ineffective     Imaging:  XR L-spine (4/18/18)    LUMBAR SPINE 2 VIEWS:       CLINICAL INFORMATION: Back pain. Lumbar fusion.       COMPARISON: Earlier film same date, 1135 hours       TECHNIQUE: Standard AP and crossfire lateral views of lumbar spine were obtained.       FINDINGS: Posterior lumbar fusion has been performed from L2-S1. Metallic pedicle screws and rods are present. A disc spacer device has been inserted at the L5-S1 level. Lumbar vertebra are normally aligned.               Impression   Postop appearance lumbar spine. Past Medical History:   Diagnosis Date    Anxiety     Arthritis     Bradycardia     HR 50 on preop EKG    Cervical spondylosis with myelopathy     COPD (chronic obstructive pulmonary disease) (HCC)     breathing worse in the heat    Depression     GERD (gastroesophageal reflux disease)     Headache(784.0)     Hyperlipidemia     Hypertension     Low back pain     was on Xanax from pain clinic - no longer has script    Prolonged emergence from general anesthesia     Snoring     no apnea, BMI 27, neck circ. 15 inches, will wake coughing, no choking, no daytime somnolence    Tobacco abuse        Past Surgical History:   Procedure Laterality Date    BACK INJECTION Right 8/3/2021    right sacroiliac joint injection performed by Kala Granda DO at [address]  11/2015    cervical fusion C3-5? Dr. Alton Cota Bilateral     Dr. Garcia [facility]  2014   [address] [facility] N/A 3/14/2022    Revision previous lumbar spine decompression instrumentation & fusion with extension to the pelvis T10 and pelvis performed by Jennifer Hyman MD at [facility] [address] Right 9/28/2021    right cluneal nerve block performed by Carlos Bauer DO at [address] [place], [place] [address]d UNLISTED N/A 4/18/2018    LUMBAR LAMINECTOMY WITH PSF L2-S1, PLIF L5-S1 WITH SOLERA 5.5 CAPSTONE AND OPAL [place] performed by Tabatha Garcia MD at [address] Left 01/2016    [facility]. Simba Robles @ [facility]    UPPER GASTROINTESTINAL ENDOSCOPY         Family History   Problem Relation Age of Onset    High Blood Pressure Mother     High Blood Pressure Father     Heart Disease Father         CAD    Heart Surgery Father 61        [employer]    COPD Sister     Emphysema Sister     Cancer Sister         Throat CA/bone cancer       Social History     Socioeconomic History    Marital status:      Spouse name: Not on file    Number of children: Not on file    Years of education: Not on file    Highest education level: Not on file   Occupational History    Not on file   Tobacco Use    Smoking status: Current Every Day Smoker     Packs/day: 0.50     Years: 48.00     Pack years: 24.00     Types: Cigarettes    Smokeless tobacco: Never Used   Vaping Use    Vaping Use: Never used   Substance and Sexual Activity    Alcohol use:  Yes     Alcohol/week: 0.0 standard drinks     Comment: occasionnally    Drug use: No    Sexual activity: Yes   Other Topics Concern    Not on file   Social History Narrative    Not on file     Social Determinants of Health     Financial Resource Strain:     Difficulty of Paying Living Expenses: Not on file   Food Insecurity:     Worried About Running Out of Food in the Last Year: Not on file    Amina gonsalves Food in the Last Year: Not on file   Transportation Needs:     Lack of Transportation (Medical): Not on file    Lack of Transportation (Non-Medical):  Not on file   Physical Activity:     Days of Exercise per Week: Not on file    Minutes of Exercise per Session: Not on file   Stress:     Feeling of Stress : Not on file   Social Connections:     Frequency of Communication with Friends and Family: Not on file    Frequency of Social Gatherings with Friends and Family: Not on file    Attends [employer]ology Services: Not on file    Active Member of Clubs or Organizations: Not on file    Attends Club or Organization Meetings: Not on file    Marital Status: Not on file   Intimate Partner Violence:     Fear of Current or Ex-Partner: Not on file    Emotionally Abused: Not on file    Physically Abused: Not on file    Sexually Abused: Not on file   Housing Stability:     Unable to Pay for Housing in the Last Year: Not on file    Number of Places Lived in the Last Year: Not on file    Unstable Housing in the Last Year: Not on file       Medications & Allergies:   Current Outpatient Medications   Medication Instructions    albuterol sulfate HFA (VENTOLIN HFA) 108 (90 Base) MCG/ACT inhaler 2 puffs, Inhalation, EVERY 4 HOURS PRN    aspirin 81 mg, Oral, DAILY    docusate sodium (COLACE) 100 mg, Oral, 2 TIMES DAILY    gabapentin (NEURONTIN) 1,200 mg, Oral, 3 TIMES DAILY    lisinopril (PRINIVIL;ZESTRIL) 5 mg, Oral, DAILY    naloxone 4 MG/0.1ML LIQD nasal spray 1 spray, Nasal, PRN    tiotropium (SPIRIVA) 18 mcg, Inhalation, DAILY    tiZANidine (ZANAFLEX) 4 mg, Oral, EVERY 8 HOURS PRN    tiZANidine (ZANAFLEX) 2 mg, Oral, 3 TIMES DAILY PRN       Allergies   Allergen Reactions    Norco [Hydrocodone-Acetaminophen] Hives and Itching       Review of Systems:   Constitutional: negative for weight changes or fevers  Genitourinary: negative for bowel/bladder incontinence   Musculoskeletal: positive for low back pain  Neurological: positive numbness in left leg  Behavioral/Psych: positive for anxiety/depression   All other systems reviewed and are negative    Objective:     Vitals:    05/13/22 0904   BP: (!) 150/78       Constitutional: Pleasant, no acute distress   Head: Normocephalic, atraumatic   Eyes: Conjunctivae normal   Neck: Supple, symmetrical   Lungs: Normal respiratory effort, non-labored breathing   Cardiovascular: Limbs warm and well perfused   Abdomen: Non-protruded   Musculoskeletal: Muscle bulk symmetric, no atrophy, no gross deformities   · Lumbar Spine: ROM reduced in extension. Lumbar paraspinals tender bilaterally. Seated SLR equivocal on right. DAVID positive right. GAENSLEN positive right. SI joint distraction test positive on right. Positive facet loading bilaterally. Right SI joint tender to palpation. Neurological: Cranial nerves II-XII grossly intact. No focal motor deficits appreciated in lower limbs  · Gait - Slightly antalgic gait. Ambulates with assistive device. Skin: Healed midline surgical incision scar in back  Psychological: Cooperative, no exaggerated pain behaviors     Assessment:    Diagnosis Orders   1. Neuropathic pain     2. Other muscle spasm  tiZANidine (ZANAFLEX) 4 MG tablet   3. Failed back surgical syndrome     4. Other chronic pain     5. Spasm of muscle         Jahaira Ni is a 61 y.o.male presenting to the pain clinic for evaluation of chronic low back/right buttocks pain in the setting of previous L2-S1 decompression/fusion in 2018. His clinical history and physical examination are consistent with severe right SI joint dysfunction/pain. I have set him up for a right SI joint injection under fluoroscopy. We may potentially investigate the use of a spinal cord stimulation in the setting of failed back surgical syndrome. He underwent a revision T10 to pelvis fusion with Dr. Aurora Andrews in March 2022. He continues to recover from this.   I have increased his gabapentin to 1200 mg 3 times daily and his tizanidine to 6 mg 3 times daily. We will follow-up in 12 weeks for reevaluation. Plan: The following treatment recommendations and plan were discussed in detail with Lois Newman. Imaging:   I have reviewed patients imaging of     Analgesics:   None; we will not be continuing opioid therapy. Adjuvants: In light of the presence of a neuropathic component of pain, I have increased his gabapentin    In the presence of musculoskeletal pain/muscle spasms, I have increased his Tizanidine to 6 mg up to 3 times daily. Interventions:   None    Anticoagulation/NPO Recommendations:   None    Multidisciplinary Pain Management:   In the presence of complex, chronic, and multi-factorial pain, the importance of a multidisciplinary approach to pain management in the patients management regimen was emphasized and discussed in great detail.    PHYSICAL THERAPY: I refer patient to physical therapy for posterior chain core strengthening program to work on lumbar paraspinal muscle strengthening    Referrals:  None    Prescriptions Written This Visit:   Gabapentin 600 mg  Tizanidine 4 mg  Tizanidine 2 mg     Follow-up: 12 weeks       Florentino Perera DO  Interventional Pain Management/PM&R   [place]

## 2022-06-03 DIAGNOSIS — M62.838 OTHER MUSCLE SPASM: ICD-10-CM

## 2022-06-06 DIAGNOSIS — M62.838 OTHER MUSCLE SPASM: ICD-10-CM

## 2022-06-06 RX ORDER — TIZANIDINE 4 MG/1
TABLET ORAL
Qty: 60 TABLET | OUTPATIENT
Start: 2022-06-06

## 2022-06-06 RX ORDER — TIZANIDINE 4 MG/1
4 TABLET ORAL EVERY 8 HOURS PRN
Qty: 270 TABLET | Refills: 0 | OUTPATIENT
Start: 2022-06-06 | End: 2022-09-04

## 2022-06-06 RX ORDER — TIZANIDINE 2 MG/1
2 TABLET ORAL 3 TIMES DAILY PRN
Qty: 90 TABLET | Refills: 2 | OUTPATIENT
Start: 2022-06-06

## 2022-06-06 NOTE — TELEPHONE ENCOUNTER
Nahun Cuadra called requesting a refill on the following medications:  Requested Prescriptions     Pending Prescriptions Disp Refills    tiZANidine (ZANAFLEX) 4 MG tablet 270 tablet 0     Sig: Take 1 tablet by mouth every 8 hours as needed (muscle spasms)     Pharmacy verified:  [facility]      Date of last visit: 05-13-22  Date of next visit (if applicable): 8/5/9886

## 2022-06-22 ENCOUNTER — TELEPHONE (OUTPATIENT)
Dept: NEUROSURGERY | Age: 67
End: 2022-06-22

## 2022-06-22 NOTE — TELEPHONE ENCOUNTER
Patient is calling in upset, stating he has left msgs at the office with no return call. He said he is in more pain now than before his surgery he had in March with Dr Orquidea Perea. He said he has a knot in the middle of his back and is having numbness in his left leg and right foot. He is asking for an appt with Dr Orquidea Perea, please call him back to discuss.

## 2022-06-23 ENCOUNTER — TELEPHONE (OUTPATIENT)
Dept: NEUROSURGERY | Age: 67
End: 2022-06-23

## 2022-06-23 NOTE — TELEPHONE ENCOUNTER
Please contact patient he is very upset stating this is like the 25th call he has made to the office trying to get an appt he is in pain getting no answer, before I could tell him he has an appt with [employer] 8-3-22 he hung up! He is very upset that no one has returned his calls and says he will be contacting he .  Please advise Referral to Dr. Lizarraga (pulmonary service) ASAP   Phone: [phone]    Keep blood pressure log for 1 week and call with readings  Our goal blood pressure for you is <130/80 mmHg  Measure blood pressure a minimum of 1 hour after taking your medications    Follow up in 6 months: clinic visit and labs    1. Weigh yourself every morning. You should weigh yourself right after you wake up and use the bathroom. Keep a log of these weights and bring them to clinic.    2. Call the clinic with weight gain of 3 pounds in 24 hours or 5 pounds in one week. This could be a sign of fluid build up, and we want to catch this early so we can prevent a hospital stay.    3. Call the clinic with worsening signs of heart failure: chest pain, dizziness, lightheadedness, fainting, increased shortness of breath, increased swelling in your legs/abdomen, poor appetite, feeling full after eating very little, being unable to lie flat in bed due to shortness of breath and having to prop your head up on pillows to sleep, having to sleep in the recliner due to shortness of breath, palpitations/fluttering sensation in the chest, and shocks from your defibrillator.    4. Notify the clinic of any hospitalizations or emergency room visits.    5. Follow a 2 liter fluid restriction (64 fluid oz) per 24 hours. This includes anything that is liquid at room temperature. Measuring your fluid is much better than \"eye-balling\" it. We want to prevent fluid build up with your heart condition.    6. Follow a 2,000 mg sodium (salt) restriction per 24 hours. The best way to stay away from salt is avoid processed foods and restaurants. Get in the habit of reading food labels. Salt causes you to retain fluid and we want to prevent fluid build up with your heart condition.    7. Avoid NSAIDS including but not limited to Ibuprofen, Aleve, and Advil. You should not use these pain relievers with your heart condition.    8. Stay away from tobacco products, alcohol and  other street drugs.    9. Take your medications as directed and be mindful of when you are in need of refills.    10. Our clinic phone number is [phone].

## 2022-07-01 DIAGNOSIS — M62.838 OTHER MUSCLE SPASM: ICD-10-CM

## 2022-07-01 RX ORDER — TIZANIDINE 2 MG/1
2 TABLET ORAL 3 TIMES DAILY PRN
Qty: 90 TABLET | Refills: 2 | OUTPATIENT
Start: 2022-07-01

## 2022-07-01 NOTE — TELEPHONE ENCOUNTER
Katheryn Muse called requesting a refill on the following medications:  Requested Prescriptions     Pending Prescriptions Disp Refills    tiZANidine (ZANAFLEX) 4 MG tablet 270 tablet 0     Sig: Take 1 tablet by mouth every 8 hours as needed (muscle spasms)     [facility]      Date of last visit:   Date of next visit (if applicable): 3/2/2926

## 2022-07-04 DIAGNOSIS — M62.838 OTHER MUSCLE SPASM: ICD-10-CM

## 2022-07-05 RX ORDER — TIZANIDINE 4 MG/1
4 TABLET ORAL EVERY 8 HOURS PRN
Qty: 270 TABLET | Refills: 0 | Status: SHIPPED | OUTPATIENT
Start: 2022-07-05 | End: 2022-10-03

## 2022-07-05 NOTE — TELEPHONE ENCOUNTER
OARRS reviewed. UDS:no screen  Last seen: 5/13/2022.  Follow-up:   Future Appointments   Date Time Provider Elizabeth Nair   7/7/2022  8:30 AM Guillermina Bain MD N [facility] Heart [facility]   7/26/2022 10:40 AM STR CT IMAGING RM1  [facility] OUT EXP STR Radiolog   8/1/2022  9:30 AM FALLON Valencia - CNP N [facility]URSU [facility]   8/5/2022  9:00 AM Linden Avila DO N [facility] Pain [facility]

## 2022-07-06 RX ORDER — TIZANIDINE 4 MG/1
TABLET ORAL
Qty: 60 TABLET | OUTPATIENT
Start: 2022-07-06

## 2022-07-26 ENCOUNTER — HOSPITAL ENCOUNTER (OUTPATIENT)
Dept: CT IMAGING | Age: 67
Discharge: HOME | End: 2022-07-26
Payer: MEDICARE

## 2022-07-26 DIAGNOSIS — Z98.1 STATUS POST LUMBAR SPINAL FUSION: ICD-10-CM

## 2022-07-26 PROCEDURE — 72131 CT LUMBAR SPINE W/O DYE: CPT

## 2022-07-28 RX ORDER — TIZANIDINE 2 MG/1
2 TABLET ORAL 3 TIMES DAILY PRN
Qty: 90 TABLET | Refills: 2 | OUTPATIENT
Start: 2022-07-28

## 2022-08-01 ENCOUNTER — OFFICE VISIT (OUTPATIENT)
Dept: NEUROSURGERY | Age: 67
End: 2022-08-01
Payer: MEDICARE

## 2022-08-01 VITALS
HEIGHT: 70 IN | BODY MASS INDEX: 29.49 KG/M2 | SYSTOLIC BLOOD PRESSURE: 120 MMHG | WEIGHT: 206 LBS | DIASTOLIC BLOOD PRESSURE: 80 MMHG

## 2022-08-01 DIAGNOSIS — Z98.1 STATUS POST LUMBAR SPINAL FUSION: Primary | ICD-10-CM

## 2022-08-01 PROCEDURE — 99213 OFFICE O/P EST LOW 20 MIN: CPT

## 2022-08-01 PROCEDURE — 1123F ACP DISCUSS/DSCN MKR DOCD: CPT

## 2022-08-01 ASSESSMENT — ENCOUNTER SYMPTOMS
BACK PAIN: 1
TROUBLE SWALLOWING: 0
CONSTIPATION: 0
SHORTNESS OF BREATH: 0
COUGH: 0
NAUSEA: 0
COLOR CHANGE: 0

## 2022-08-01 NOTE — PROGRESS NOTES
Neurosurgery Follow up Note    Date:8/1/2022         Patient Name:Woodrow Diez     YOB: 1955     Age:67 y.o. Reason for Follow up: Follow up with CT of the lumbar spine      Chief Complaint:   Chief Complaint   Patient presents with    Follow-up     CT         Subjective   Johann Sarah is a 74year old male who presents to the office alone ambulating without assistive device. Patient stated his lumbar pain today is a 7 at its worst and a 5 on average . He stated that he is starting to get feeling back in his left anterior thigh. He stated he is experiencing the burning to his right thigh that seems to have gotten worse. He stated that his right foot goes numb if he sits too long. Patient stated that his activity has changed due to his back pain. He stated he is continuing to perform his home exercises. Patient is doing well overall. Patient denies recent fall or trauma. Review of Systems   Review of Systems   Constitutional:  Positive for activity change. Negative for appetite change and fatigue. HENT:  Negative for trouble swallowing. Eyes:  Negative for visual disturbance. Respiratory:  Negative for cough and shortness of breath. Cardiovascular:  Negative for chest pain. Gastrointestinal:  Negative for constipation and nausea. Musculoskeletal:  Positive for back pain and myalgias. Negative for gait problem. Skin:  Negative for color change, rash and wound (well healed surgical incision). Neurological:  Positive for numbness. Negative for dizziness and weakness. Psychiatric/Behavioral:  Positive for sleep disturbance. Negative for confusion. The patient is not nervous/anxious.       Medications     Current Outpatient Medications on File Prior to Visit   Medication Sig Dispense Refill    tiZANidine (ZANAFLEX) 4 MG tablet Take 1 tablet by mouth every 8 hours as needed (muscle spasms) 270 tablet 0    tiZANidine (ZANAFLEX) 2 MG tablet Take 1 tablet by mouth 3 times daily as needed (muscle spasms) 90 tablet 2    albuterol sulfate HFA (PROVENTIL;VENTOLIN;PROAIR) 108 (90 Base) MCG/ACT inhaler Inhale 2 puffs into the lungs every 4 hours as needed for Wheezing or Shortness of Breath      tiotropium (SPIRIVA) 18 MCG inhalation capsule Inhale 18 mcg into the lungs daily      lisinopril (PRINIVIL;ZESTRIL) 5 MG tablet Take 5 mg by mouth daily      aspirin 81 MG chewable tablet Take 1 tablet by mouth daily 30 tablet 3    gabapentin (NEURONTIN) 600 MG tablet Take 2 tablets by mouth 3 times daily for 30 days. 180 tablet 2    docusate sodium (COLACE) 100 MG capsule Take 1 capsule by mouth 2 times daily      naloxone 4 MG/0.1ML LIQD nasal spray 1 spray by Nasal route as needed for Opioid Reversal 1 each 5     No current facility-administered medications on file prior to visit. Past History    Past Medical History:   has a past medical history of Anxiety, Arthritis, Bradycardia, Cervical spondylosis with myelopathy, COPD (chronic obstructive pulmonary disease) ([facility]), Depression, GERD (gastroesophageal reflux disease), Headache(784.0), Hyperlipidemia, Hypertension, Low back pain, Prolonged emergence from general anesthesia, Snoring, and Tobacco abuse. Social History:   reports that he has been smoking cigarettes. He has a 24.00 pack-year smoking history. He has never used smokeless tobacco. He reports current alcohol use. He reports that he does not use drugs.      Family History:   Family History   Problem Relation Age of Onset    High Blood Pressure Mother     High Blood Pressure Father     Heart Disease Father         CAD    Heart Surgery Father 61        CAGB    COPD Sister     Emphysema Sister     Cancer Sister         Throat CA/bone cancer       Physical Examination      Vitals:  /80 (Site: Right Upper Arm, Position: Sitting, Cuff Size: Large Adult)   Ht 5' 10\" (1.778 m)   Wt 206 lb (93.4 kg)   BMI 29.56 kg/m²       Physical Exam  Constitutional:       Appearance: Normal appearance. He is not ill-appearing. HENT:      Nose: Nose normal.      Mouth/Throat:      Mouth: Mucous membranes are moist.   Eyes:      Pupils: Pupils are equal, round, and reactive to light. Cardiovascular:      Rate and Rhythm: Normal rate. Pulses: Normal pulses. Pulmonary:      Effort: Pulmonary effort is normal.   Abdominal:      General: Bowel sounds are normal.   Musculoskeletal:         General: No tenderness. Cervical back: Normal range of motion. Skin:     General: Skin is warm and dry. Findings: No erythema. Comments: Well healed surgical incision     Neurological:      Mental Status: He is alert and oriented to person, place, and time. Sensory: Sensory deficit present. Motor: No weakness. Psychiatric:         Mood and Affect: Mood normal.         Behavior: Behavior normal.         Thought Content: Thought content normal.         Judgment: Judgment normal.     Neurologic Exam     Mental Status   Oriented to person, place, and time. Cranial Nerves     CN III, IV, VI   Pupils are equal, round, and reactive to light. Imaging   Imaging last 30 days:  CT LUMBAR SPINE WO CONTRAST    Result Date: 7/26/2022  1. Extensive postoperative changes with bilateral pedicular screws extending from T10 to S1. Bilateral iliac screws in place. 2. Status post laminectomy and fusion between L1 and L5. 3. Postoperative change within the L5-S1 disc space. 4. There is mild bilateral foraminal stenosis with no canal stenosis at L1-2, L2-3, L3-4 and L4-5. There is mild-to-moderate bilateral foraminal stenosis with no canal stenosis at L5-S1. 5. There is degenerative change involving the sacroiliac joints bilaterally. 6. There is diffuse osteopenia. 7. There is degenerative change involving the sacroiliac joints bilaterally. 8. There is atrophy in the paraspinal muscles posteriorly. **This report has been created using voice recognition software.  It may contain minor errors which are inherent in voice recognition technology. ** Final report electronically signed by DR Edgard Sheridan on 7/26/2022 1:34 PM         Assessment and Plan:          Follow up with CT of the lumbar spine   CT of the lumbar spine reviewed with patient  Continue home excercises. Follow up with pain management to see if he is candidate or injection therapy or a spinal cord stimulator trial   Fall precautions  Patient  doing well overall  Follow up prn   Call office is symptoms worsen or fail to improve  All patient questions answered. Pt voiced understanding.  Patient instructed to call the office with any questions or concerns      Electronically signed by FALLON Mendoza CNP on 8/1/22 at 10:42 AM EDT

## 2022-08-08 RX ORDER — GABAPENTIN 600 MG/1
1200 TABLET ORAL 3 TIMES DAILY
Qty: 180 TABLET | Refills: 2 | OUTPATIENT
Start: 2022-08-08 | End: 2022-11-06

## 2022-08-08 RX ORDER — GABAPENTIN 600 MG/1
1200 TABLET ORAL 3 TIMES DAILY
Qty: 180 TABLET | Refills: 2 | Status: SHIPPED | OUTPATIENT
Start: 2022-08-08 | End: 2022-11-03 | Stop reason: SDUPTHER

## 2022-08-08 RX ORDER — TIZANIDINE 2 MG/1
2 TABLET ORAL 3 TIMES DAILY PRN
Qty: 90 TABLET | Refills: 2 | OUTPATIENT
Start: 2022-08-08

## 2022-08-08 NOTE — TELEPHONE ENCOUNTER
OARRS reviewed. UDS: negative   Last seen: 5/13/2022.  Follow-up:   Future Appointments   Date Time Provider Elizabeth Nair   8/25/2022  2:20 PM DO ADEEL Joyce [facility] - [facility]UR[facility] M[facility]RQUEZ II.MICHELLE

## 2022-08-25 ENCOUNTER — OFFICE VISIT (OUTPATIENT)
Dept: PHYSICAL MEDICINE AND REHAB | Age: 67
End: 2022-08-25
Payer: MEDICARE

## 2022-08-25 VITALS
BODY MASS INDEX: 29.49 KG/M2 | SYSTOLIC BLOOD PRESSURE: 122 MMHG | HEIGHT: 70 IN | DIASTOLIC BLOOD PRESSURE: 78 MMHG | WEIGHT: 206 LBS

## 2022-08-25 DIAGNOSIS — G89.29 OTHER CHRONIC PAIN: ICD-10-CM

## 2022-08-25 DIAGNOSIS — M62.838 OTHER MUSCLE SPASM: Primary | ICD-10-CM

## 2022-08-25 DIAGNOSIS — M96.1 FAILED BACK SURGICAL SYNDROME: ICD-10-CM

## 2022-08-25 DIAGNOSIS — M47.814 THORACIC SPONDYLOSIS: ICD-10-CM

## 2022-08-25 PROCEDURE — 4004F PT TOBACCO SCREEN RCVD TLK: CPT | Performed by: ANESTHESIOLOGY

## 2022-08-25 PROCEDURE — 3017F COLORECTAL CA SCREEN DOC REV: CPT | Performed by: ANESTHESIOLOGY

## 2022-08-25 PROCEDURE — G8427 DOCREV CUR MEDS BY ELIG CLIN: HCPCS | Performed by: ANESTHESIOLOGY

## 2022-08-25 PROCEDURE — G8417 CALC BMI ABV UP PARAM F/U: HCPCS | Performed by: ANESTHESIOLOGY

## 2022-08-25 PROCEDURE — 1123F ACP DISCUSS/DSCN MKR DOCD: CPT | Performed by: ANESTHESIOLOGY

## 2022-08-25 PROCEDURE — 99214 OFFICE O/P EST MOD 30 MIN: CPT | Performed by: ANESTHESIOLOGY

## 2022-08-25 RX ORDER — OXYCODONE HYDROCHLORIDE AND ACETAMINOPHEN 5; 325 MG/1; MG/1
1 TABLET ORAL DAILY PRN
Qty: 14 TABLET | Refills: 0 | Status: SHIPPED | OUTPATIENT
Start: 2022-08-25 | End: 2022-09-08

## 2022-08-25 RX ORDER — TIZANIDINE 2 MG/1
2 TABLET ORAL 3 TIMES DAILY PRN
Qty: 90 TABLET | Refills: 2 | OUTPATIENT
Start: 2022-08-25

## 2022-08-25 NOTE — PROGRESS NOTES
Chronic Pain/PM&R Clinic Note     Encounter Date: 8/25/22    Subjective:   Chief Complaint:   Chief Complaint   Patient presents with    Follow-up     Middle back pain     Medication Refill     Gabapentin       History of Present Illness:   Kathia Brewer is a 74 y.o. male seen in the clinic initially on 07/19/21 upon request from 2020 Alena Koo MD for his history of chronic low back pain. He has a medical history of previous L2-S1 lumbar decompression/fusion by Dr. Westley Sethi in 2018, COPD, and anxiety/depression. He has been dealing with chronic low back and right-sided buttocks pain since his back surgery in 2018. He describes his pain to be a constant 9/10 stabbing, pins/needles, burning pain. States that the buttocks pain will radiate to the lateral aspect of the hip and down the lateral aspect and posterior aspect of the thigh. He states his pain is worse with any activity where he is upright walking. His pain is improved with rest and medications. He feels like the right leg is weaker than the left but denies any associated numbness/tingling, saddle anesthesia, bowel/bladder incontinence. He states that he has seen previous pain management (Dr. Tamela Biswas). He states that he had a couple different injections including SI joint injection, lumbar epidural steroid injections, and facet injections. He feels like ejections were not helping him out much. Today, 8/25/2022, patient presents for planned follow-up for chronic low back pain and thoracic back pain. He states that he continues to have a lot of sharp stabbing pain above his scar tissue after his extension fusion surgery back in March 2022. He is frustrated overall as he feels like it is very difficult for him to even turn in bed or do any twisting motions without the sharp stabbing pain. He denies any other pain rating down his legs, new leg weakness, leg numbness/tingling, or bowel/bladder incontinence episodes.   He states that he would like to trial some medication to keep him functional until he can have a procedure done. History of Interventions:   Surgery: Previous L2-S1 decompression/fusion in 2018 (Dr. Arlen Chilel); Revision T10-pelvis fusion (Dr. Heather Aldana) in 3/2022  Injections: Multiple in past (Dr. Jovani Silveira)  R SI joint inj (8/3/21) - no relief  R cluneal nerve block (9/28/21) - no relief    Current Treatment Medications:   Gabapentin 1200 mg TID  Tizanidine 6 mg TID PRN    Historical Treatment Medications:   Norco - hives, itching  Tramadol - ineffective  Naproxen - ineffective   Lyrica - ineffective     Imaging:  XR L-spine (4/18/18)    LUMBAR SPINE 2 VIEWS:       CLINICAL INFORMATION: Back pain. Lumbar fusion. COMPARISON: Earlier film same date, 1135 hours       TECHNIQUE: Standard AP and crossfire lateral views of lumbar spine were obtained. FINDINGS: Posterior lumbar fusion has been performed from L2-S1. Metallic pedicle screws and rods are present. A disc spacer device has been inserted at the L5-S1 level. Lumbar vertebra are normally aligned. Impression   Postop appearance lumbar spine. Past Medical History:   Diagnosis Date    Anxiety     Arthritis     Bradycardia     HR 50 on preop EKG    Cervical spondylosis with myelopathy     COPD (chronic obstructive pulmonary disease) (H[facility])     breathing worse in the heat    Depression     GERD (gastroesophageal reflux disease)     Headache(784.0)     Hyperlipidemia     Hypertension     Low back pain     was on Xanax from pain clinic - no longer has script    Prolonged emergence from general anesthesia     Snoring     no apnea, BMI 27, neck circ. 15 inches, will wake coughing, no choking, no daytime somnolence    Tobacco abuse        Past Surgical History:   Procedure Laterality Date    BACK INJECTION Right 8/3/2021    right sacroiliac joint injection performed by Nanda Platt DO at [address]  11/2015    cervical fusion C3-5?  Dr. Bogdan Cantu REMOVAL WITH IMPLANT Bilateral     Dr. Sylvia Sykes    COLONOSCOPY  2014    LUMBAR FUSION N/A 3/14/2022    Revision previous lumbar spine decompression instrumentation & fusion with extension to the pelvis T10 and pelvis performed by Robin Hurst MD at [address] Right 9/28/2021    right cluneal nerve block performed by Ulysses Caldron, DO at [address] UNLISTED N/A 4/18/2018    LUMBAR LAMINECTOMY WITH PSF L2-S1, PLIF L5-S1 WITH SOLERA 5.5 CAPSTONE AND OPAL [place] performed by Mary Acuna MD at [address] Left 01/2016    Dr. Melissa Ma @ [facility]    UPPER GASTROINTESTINAL ENDOSCOPY         Family History   Problem Relation Age of Onset    High Blood Pressure Mother     High Blood Pressure Father     Heart Disease Father         CAD    Heart Surgery Father 61        CAGB    COPD Sister     Emphysema Sister     Cancer Sister         Throat CA/bone cancer       Social History     Socioeconomic History    Marital status:      Spouse name: Not on file    Number of children: Not on file    Years of education: Not on file    Highest education level: Not on file   Occupational History    Not on file   Tobacco Use    Smoking status: Every Day     Packs/day: 0.50     Years: 48.00     Pack years: 24.00     Types: Cigarettes    Smokeless tobacco: Never   Vaping Use    Vaping Use: Never used   Substance and Sexual Activity    Alcohol use:  Yes     Alcohol/week: 0.0 standard drinks     Comment: occasionnally    Drug use: No    Sexual activity: Yes   Other Topics Concern    Not on file   Social History Narrative    Not on file     Social Determinants of Health     Financial Resource Strain: Not on file   Food Insecurity: Not on file   Transportation Needs: Not on file   Physical Activity: Not on file   Stress: Not on file   Social Connections: Not on file   Intimate Partner Violence: Not on file   Housing Stability: Not on file Medications & Allergies:   Current Outpatient Medications   Medication Instructions    albuterol sulfate HFA (PROVENTIL;VENTOLIN;PROAIR) 108 (90 Base) MCG/ACT inhaler 2 puffs, Inhalation, EVERY 4 HOURS PRN    aspirin 81 mg, Oral, DAILY    gabapentin (NEURONTIN) 1,200 mg, Oral, 3 TIMES DAILY    gabapentin (NEURONTIN) 1,200 mg, Oral, 3 TIMES DAILY    lisinopril (PRINIVIL;ZESTRIL) 5 mg, Oral, DAILY    oxyCODONE-acetaminophen (PERCOCET) 5-325 MG per tablet 1 tablet, Oral, DAILY PRN    tiotropium (SPIRIVA) 18 mcg, Inhalation, DAILY    tiZANidine (ZANAFLEX) 2 mg, Oral, 3 TIMES DAILY PRN    tiZANidine (ZANAFLEX) 4 mg, Oral, EVERY 8 HOURS PRN       Allergies   Allergen Reactions    Norco [Hydrocodone-Acetaminophen] Hives and Itching       Review of Systems:   Constitutional: negative for weight changes or fevers  Genitourinary: negative for bowel/bladder incontinence   Musculoskeletal: positive for low back pain  Neurological: positive numbness in left leg  Behavioral/Psych: positive for anxiety/depression   All other systems reviewed and are negative    Objective:     Vitals:    08/25/22 1429   BP: 122/78       Constitutional: Pleasant, no acute distress   Head: Normocephalic, atraumatic   Eyes: Conjunctivae normal   Neck: Supple, symmetrical   Lungs: Normal respiratory effort, non-labored breathing   Cardiovascular: Limbs warm and well perfused   Abdomen: Non-protruded   Musculoskeletal: Muscle bulk symmetric, no atrophy, no gross deformities   · Thoracic spine: Tenderness palpation above incision scar. Positive thoracic facet loading above his fusion. Increased tonicity of muscles paraspinal T8-T10  Neurological: Cranial nerves II-XII grossly intact. No focal motor deficits appreciated in lower limbs  · Gait - Slightly antalgic gait. Ambulates with assistive device.    Skin: Healed midline surgical incision scar in back  Psychological: Cooperative, no exaggerated pain behaviors     Assessment:    Diagnosis Orders 1. Other muscle spasm        2. Other chronic pain  oxyCODONE-acetaminophen (PERCOCET) 5-325 MG per tablet      3. Thoracic spondylosis  CHG FLUOR NEEDLE/CATH SPINE/PARASPINAL DX/THER ADDON    WY INJ DX/THER AGNT PARAVERT FACET JOINT, CERV/THORAC, 1ST LEVEL    [place] INJ DX/THER AGNT PARAVERT FACET JOINT, CERV/THORAC, 2ND LEVEL      4. Failed back surgical syndrome              Kostas oPrter is a 74 y. o.male presenting to the pain clinic for evaluation of chronic low back/right buttocks pain in the setting of previous L2-S1 decompression/fusion in 2018. His clinical history and physical examination are consistent with severe right SI joint dysfunction/pain. I have set him up for a right SI joint injection under fluoroscopy. We may potentially investigate the use of a spinal cord stimulation in the setting of failed back surgical syndrome. He underwent a revision T10 to pelvis fusion with Dr. Edgard Marie in March 2022. He has facet mediated pain above his level of fusion. I set him up for diagnostic facet injections at T8/T9 and T9/T10. Given her short prescription for Percocet until we can get him to injection therapy. Plan: The following treatment recommendations and plan were discussed in detail with Kostas Porter. Imaging:   I have reviewed patients imaging of     Analgesics:   None; we will not be continuing opioid therapy. Adjuvants: In light of the presence of a neuropathic component of pain, I have increased his gabapentin    In the presence of musculoskeletal pain/muscle spasms, I have increased his Tizanidine to 6 mg up to 3 times daily. Interventions: With examination consistent with thoracic back pain secondary to thoracic spondylosis and facet mediated pain, I set the patient up for diagnostic thoracic medial branch blocks targeting facet levels T8/T9 and T9/T10 (above his spinal fusion). Risks of procedure discussed detail the patient.   Patient wants proceed with the

## 2022-08-26 RX ORDER — GABAPENTIN 600 MG/1
1200 TABLET ORAL 3 TIMES DAILY
Qty: 180 TABLET | Refills: 2 | Status: SHIPPED | OUTPATIENT
Start: 2022-08-26 | End: 2022-10-07

## 2022-09-06 ENCOUNTER — PREP FOR PROCEDURE (OUTPATIENT)
Dept: PHYSICAL MEDICINE AND REHAB | Age: 67
End: 2022-09-06

## 2022-09-06 NOTE — H&P
Today, patient presents for planned thoracic medial branch blocks targeting facet levels T8/T9 and T9/T10    This note is reflective of the patient's previous visit for evaluation. We will proceed with today's planned procedure. Since patient's last visit for evaluation, there have been no interval changes in medical history. Patient has no new numbness, weakness, or focal neurological deficit since evaluation. Patient has no contraindications to injection (no anticoagulation or recent antibiotic intake for active infections), and has a  present or is able to drive themselves (as discussed and cleared by physician). Allergies to latex, contrast dye, and steroid medications have been confirmed with the patient prior to the procedure. NPO necessity has been assessed and accepted based on procedure complexity. The risks and benefits of the procedure have been explained including but are not limited to infection, bleeding, paralysis, immediate post procedure weakness, and dizziness; the patient acknowledges understanding and desires to proceed with the procedure. Patient has signed consent for same procedure as discussed in previous clinic encounter. All other questions and concerns were addressed at bedside. See procedure note for full details. Post procedure Instructions: The patient was advised not to drive during the day of the procedure and not to engage in any significant decision making (unless otherwise states by physician). The patient was also advised to be cautious with walking/activity for 24 hours following today's visit and asked not to engage in over-exertion (unless otherwise states by physician). After this time, it is ok to resume pre-procedure level of activity. Patient advised to apply ice to site of injection in situations of pain and discomfort. Patient advised to not submerge site of injection during bath or pool activities for approximately 24 hours post-procedure.  Patient attested to understanding post procedure directions / restrictions. All other questions and concerns addressed before patient discharge in ambulatory fashion. Chronic Pain/PM&R Clinic Note     Encounter Date: 8/25/22    Subjective:   Chief Complaint:   No chief complaint on file. History of Present Illness:   Gerardo  is a 74 y.o. male seen in the clinic initially on 07/19/21 upon request from 2020 Alena Koo MD for his history of chronic low back pain. He has a medical history of previous L2-S1 lumbar decompression/fusion by Dr. Aislinn Rosenberg in 2018, COPD, and anxiety/depression. He has been dealing with chronic low back and right-sided buttocks pain since his back surgery in 2018. He describes his pain to be a constant 9/10 stabbing, pins/needles, burning pain. States that the buttocks pain will radiate to the lateral aspect of the hip and down the lateral aspect and posterior aspect of the thigh. He states his pain is worse with any activity where he is upright walking. His pain is improved with rest and medications. He feels like the right leg is weaker than the left but denies any associated numbness/tingling, saddle anesthesia, bowel/bladder incontinence. He states that he has seen previous pain management (Dr. Yuly Buckley). He states that he had a couple different injections including SI joint injection, lumbar epidural steroid injections, and facet injections. He feels like ejections were not helping him out much. Today, 8/25/2022, patient presents for planned follow-up for chronic low back pain and thoracic back pain. He states that he continues to have a lot of sharp stabbing pain above his scar tissue after his extension fusion surgery back in March 2022. He is frustrated overall as he feels like it is very difficult for him to even turn in bed or do any twisting motions without the sharp stabbing pain.   He denies any other pain rating down his legs, new leg weakness, leg numbness/tingling, or bowel/bladder incontinence episodes. He states that he would like to trial some medication to keep him functional until he can have a procedure done. History of Interventions:   Surgery: Previous L2-S1 decompression/fusion in 2018 (Dr. Dan Fleischer); Revision [id number] fusion (Dr. Ofe Patiño) in 3/2022  Injections: Multiple in past (Dr. Aaron Knox)  R SI joint inj (8/3/21) - no relief  R cluneal nerve block (9/28/21) - no relief    Current Treatment Medications:   Gabapentin 1200 mg TID  Tizanidine 6 mg TID PRN    Historical Treatment Medications:   Norco - hives, itching  Tramadol - ineffective  Naproxen - ineffective   Lyrica - ineffective     Imaging:  XR L-spine (4/18/18)    LUMBAR SPINE 2 VIEWS:       CLINICAL INFORMATION: Back pain. Lumbar fusion. COMPARISON: Earlier film same date, 1135 hours       TECHNIQUE: Standard AP and crossfire lateral views of lumbar spine were obtained. FINDINGS: Posterior lumbar fusion has been performed from L2-S1. Metallic pedicle screws and rods are present. A disc spacer device has been inserted at the L5-S1 level. Lumbar vertebra are normally aligned. Impression   Postop appearance lumbar spine. Past Medical History:   Diagnosis Date    Anxiety     Arthritis     Bradycardia     HR 50 on preop EKG    Cervical spondylosis with myelopathy     COPD (chronic obstructive pulmonary disease) (HCC)     breathing worse in the heat    Depression     GERD (gastroesophageal reflux disease)     Headache(784.0)     Hyperlipidemia     Hypertension     Low back pain     was on Xanax from pain clinic - no longer has script    Prolonged emergence from general anesthesia     Snoring     no apnea, BMI 27, neck circ. 15 inches, will wake coughing, no choking, no daytime somnolence    Tobacco abuse        Past Surgical History:   Procedure Laterality Date    BACK INJECTION Right 8/3/2021    right sacroiliac joint injection performed by Zara Fatima DO at [facility] Intimate Partner Violence: Not on file   Housing Stability: Not on file       Medications & Allergies:   Current Outpatient Medications   Medication Instructions    albuterol sulfate HFA (PROVENTIL;VENTOLIN;PROAIR) 108 (90 Base) MCG/ACT inhaler 2 puffs, Inhalation, EVERY 4 HOURS PRN    aspirin 81 mg, Oral, DAILY    gabapentin (NEURONTIN) 1,200 mg, Oral, 3 TIMES DAILY    gabapentin (NEURONTIN) 1,200 mg, Oral, 3 TIMES DAILY    lisinopril (PRINIVIL;ZESTRIL) 5 mg, Oral, DAILY    oxyCODONE-acetaminophen (PERCOCET) 5-325 MG per tablet 1 tablet, Oral, DAILY PRN    tiotropium (SPIRIVA) 18 mcg, Inhalation, DAILY    tiZANidine (ZANAFLEX) 2 mg, Oral, 3 TIMES DAILY PRN    tiZANidine (ZANAFLEX) 4 mg, Oral, EVERY 8 HOURS PRN       Allergies   Allergen Reactions    Norco [Hydrocodone-Acetaminophen] Hives and Itching       Review of Systems:   Constitutional: negative for weight changes or fevers  Genitourinary: negative for bowel/bladder incontinence   Musculoskeletal: positive for low back pain  Neurological: positive numbness in left leg  Behavioral/Psych: positive for anxiety/depression   All other systems reviewed and are negative    Objective: There were no vitals filed for this visit. Constitutional: Pleasant, no acute distress   Head: Normocephalic, atraumatic   Eyes: Conjunctivae normal   Neck: Supple, symmetrical   Lungs: Normal respiratory effort, non-labored breathing   Cardiovascular: Limbs warm and well perfused   Abdomen: Non-protruded   Musculoskeletal: Muscle bulk symmetric, no atrophy, no gross deformities   · Thoracic spine: Tenderness palpation above incision scar. Positive thoracic facet loading above his fusion. Increased tonicity of muscles paraspinal T8-T10  Neurological: Cranial nerves II-XII grossly intact. No focal motor deficits appreciated in lower limbs  · Gait - Slightly antalgic gait. Ambulates with assistive device.    Skin: Healed midline surgical incision scar in back  Psychological: Cooperative, no exaggerated pain behaviors     Assessment:    Diagnosis Orders   1. Other muscle spasm        2. Other chronic pain  oxyCODONE-acetaminophen (PERCOCET) 5-325 MG per tablet      3. Thoracic spondylosis  CHG FLUOR NEEDLE/CATH SPINE/PARASPINAL DX/THER ADDON    AZ INJ DX/THER AGNT PARAVERT FACET JOINT, CERV/THORAC, 1ST LEVEL    AZ INJ DX/THER AGNT PARAVERT FACET JOINT, CERV/THORAC, 2ND LEVEL      4. Failed back surgical syndrome              Hilary Pelletier is a 74 y. o.male presenting to the pain clinic for evaluation of chronic low back/right buttocks pain in the setting of previous L2-S1 decompression/fusion in 2018. His clinical history and physical examination are consistent with severe right SI joint dysfunction/pain. I have set him up for a right SI joint injection under fluoroscopy. We may potentially investigate the use of a spinal cord stimulation in the setting of failed back surgical syndrome. He underwent a revision T10 to pelvis fusion with Dr. Evgeny Vela in March 2022. He has facet mediated pain above his level of fusion. I set him up for diagnostic facet injections at T8/T9 and T9/T10. Given her short prescription for Percocet until we can get him to injection therapy. Plan: The following treatment recommendations and plan were discussed in detail with Hilary Pelletier. Imaging:   I have reviewed patients imaging of     Analgesics:   None; we will not be continuing opioid therapy. Adjuvants: In light of the presence of a neuropathic component of pain, I have increased his gabapentin    In the presence of musculoskeletal pain/muscle spasms, I have increased his Tizanidine to 6 mg up to 3 times daily. Interventions: With examination consistent with thoracic back pain secondary to thoracic spondylosis and facet mediated pain, I set the patient up for diagnostic thoracic medial branch blocks targeting facet levels T8/T9 and T9/T10 (above his spinal fusion).   Risks of procedure discussed detail the patient. Patient wants proceed with the injection. Anticoagulation/NPO Recommendations:   Patient will need to hold NSAIDs x2 days prior to procedure  Patient will need to be NPO x8 hours  We will use IV sedation    Multidisciplinary Pain Management:   In the presence of complex, chronic, and multi-factorial pain, the importance of a multidisciplinary approach to pain management in the patients management regimen was emphasized and discussed in great detail.    PHYSICAL THERAPY: I refer patient to physical therapy for posterior chain core strengthening program to work on lumbar paraspinal muscle strengthening    Referrals:  None    Prescriptions Written This Visit:   Percocet 5 mg (#14, 0 refills)    Follow-up: For thoracic MBBs      Bhargav Cruz, DO  Interventional Pain Management/PM&R   [place]

## 2022-09-06 NOTE — DISCHARGE INSTRUCTIONS

## 2022-09-07 ENCOUNTER — HOSPITAL ENCOUNTER (OUTPATIENT)
Age: 67
Setting detail: OUTPATIENT SURGERY
Discharge: HOME | End: 2022-09-07
Attending: ANESTHESIOLOGY | Admitting: ANESTHESIOLOGY
Payer: MEDICARE

## 2022-09-07 ENCOUNTER — APPOINTMENT (OUTPATIENT)
Dept: GENERAL RADIOLOGY | Age: 67
End: 2022-09-07
Attending: ANESTHESIOLOGY
Payer: MEDICARE

## 2022-09-07 VITALS
BODY MASS INDEX: 30.35 KG/M2 | TEMPERATURE: 97.1 F | RESPIRATION RATE: 16 BRPM | HEIGHT: 70 IN | SYSTOLIC BLOOD PRESSURE: 113 MMHG | HEART RATE: 50 BPM | DIASTOLIC BLOOD PRESSURE: 65 MMHG | OXYGEN SATURATION: 94 % | WEIGHT: 212 LBS

## 2022-09-07 PROCEDURE — 99152 MOD SED SAME PHYS/QHP 5/>YRS: CPT | Performed by: ANESTHESIOLOGY

## 2022-09-07 PROCEDURE — 7100000011 HC PHASE II RECOVERY - ADDTL 15 MIN: Performed by: ANESTHESIOLOGY

## 2022-09-07 PROCEDURE — 2709999900 HC NON-CHARGEABLE SUPPLY: Performed by: ANESTHESIOLOGY

## 2022-09-07 PROCEDURE — 3600000054 HC PAIN LEVEL 3 BASE: Performed by: ANESTHESIOLOGY

## 2022-09-07 PROCEDURE — 7100000010 HC PHASE II RECOVERY - FIRST 15 MIN: Performed by: ANESTHESIOLOGY

## 2022-09-07 PROCEDURE — 64491 INJ PARAVERT F JNT C/T 2 LEV: CPT | Performed by: ANESTHESIOLOGY

## 2022-09-07 PROCEDURE — 2500000003 HC RX 250 WO HCPCS: Performed by: ANESTHESIOLOGY

## 2022-09-07 PROCEDURE — 6360000002 HC RX W HCPCS: Performed by: ANESTHESIOLOGY

## 2022-09-07 PROCEDURE — 64490 INJ PARAVERT F JNT C/T 1 LEV: CPT | Performed by: ANESTHESIOLOGY

## 2022-09-07 PROCEDURE — 3209999900 FLUORO FOR SURGICAL PROCEDURES

## 2022-09-07 RX ORDER — ATORVASTATIN CALCIUM 40 MG/1
40 TABLET, FILM COATED ORAL DAILY
COMMUNITY

## 2022-09-07 RX ORDER — LIDOCAINE HYDROCHLORIDE 10 MG/ML
INJECTION, SOLUTION EPIDURAL; INFILTRATION; INTRACAUDAL; PERINEURAL PRN
Status: DISCONTINUED | OUTPATIENT
Start: 2022-09-07 | End: 2022-09-07 | Stop reason: ALTCHOICE

## 2022-09-07 RX ORDER — BUPIVACAINE HYDROCHLORIDE 5 MG/ML
INJECTION, SOLUTION PERINEURAL PRN
Status: DISCONTINUED | OUTPATIENT
Start: 2022-09-07 | End: 2022-09-07 | Stop reason: ALTCHOICE

## 2022-09-07 RX ORDER — MIDAZOLAM HYDROCHLORIDE 1 MG/ML
INJECTION INTRAMUSCULAR; INTRAVENOUS PRN
Status: DISCONTINUED | OUTPATIENT
Start: 2022-09-07 | End: 2022-09-07 | Stop reason: ALTCHOICE

## 2022-09-07 RX ORDER — FENTANYL CITRATE 50 UG/ML
INJECTION, SOLUTION INTRAMUSCULAR; INTRAVENOUS PRN
Status: DISCONTINUED | OUTPATIENT
Start: 2022-09-07 | End: 2022-09-07 | Stop reason: ALTCHOICE

## 2022-09-07 ASSESSMENT — PAIN SCALES - GENERAL: PAINLEVEL_OUTOF10: 8

## 2022-09-07 ASSESSMENT — PAIN DESCRIPTION - LOCATION: LOCATION: BACK

## 2022-09-07 ASSESSMENT — PAIN DESCRIPTION - ORIENTATION: ORIENTATION: LOWER

## 2022-09-07 NOTE — PRE SEDATION
[address] [facility]  Pre-Sedation/Analgesia History & Physical    Pt Name: John Maynard  MRN: [id number]  YOB: 1955  Provider Performing Procedure: Endy Marin DO   Primary Care Physician: Sebastian Arora MD      MEDICAL HISTORY       has a past medical history of Anxiety, Arthritis, Bradycardia, Cervical spondylosis with myelopathy, COPD (chronic obstructive pulmonary disease) ([address] Depression, GERD (gastroesophageal reflux disease), Headache(784.0), Hyperlipidemia, Hypertension, Low back pain, Prolonged emergence from general anesthesia, Snoring, and Tobacco abuse. SURGICAL HISTORY   has a past surgical history that includes Colonoscopy (2014); Rotator cuff repair (Left, 01/2016); back surgery (11/2015); Upper gastrointestinal endoscopy; Cataract removal with implant (Bilateral); pr spine surgery procedure unlisted (N/A, 4/18/2018); Back Injection (Right, 8/3/2021); Pain management procedure (Right, 9/28/2021); and lumbar fusion (N/A, 3/14/2022). ALLERGIES   Allergies as of 08/25/2022 - Fully Reviewed 08/25/2022   Allergen Reaction Noted    Norco [hydrocodone-acetaminophen] Hives and Itching 04/18/2018       MEDICATIONS   Prior to Admission medications    Medication Sig Start Date End Date Taking? Authorizing Provider   atorvastatin (LIPITOR) 40 MG tablet Take 40 mg by mouth daily   Yes Historical Provider, MD   gabapentin (NEURONTIN) 600 MG tablet Take 2 tablets by mouth 3 times daily for 30 days. 8/26/22 9/25/22  Linden Shetty DO   oxyCODONE-acetaminophen (PERCOCET) 5-325 MG per tablet Take 1 tablet by mouth daily as needed for Pain for up to 14 days. 8/25/22 9/8/22  Linden Shetty DO   gabapentin (NEURONTIN) 600 MG tablet Take 2 tablets by mouth in the morning and 2 tablets at noon and 2 tablets before bedtime. Do all this for 90 days.  8/8/22 11/6/22  Linden Shetty DO   tiZANidine (ZANAFLEX) 4 MG tablet Take 1 tablet by mouth every 8 hours as needed (muscle spasms) 7/5/22 10/3/22  Linden Shetty DO   tiZANidine (ZANAFLEX) 2 MG tablet Take 1 tablet by mouth 3 times daily as needed (muscle spasms) 5/13/22   Linden Shetty DO   albuterol sulfate HFA (PROVENTIL;VENTOLIN;PROAIR) 108 (90 Base) MCG/ACT inhaler Inhale 2 puffs into the lungs every 4 hours as needed for Wheezing or Shortness of Breath    Historical Provider, MD   tiotropium (SPIRIVA) 18 MCG inhalation capsule Inhale 18 mcg into the lungs daily    Historical Provider, MD   aspirin 81 MG chewable tablet Take 1 tablet by mouth daily 9/6/17   Artur Perales MD     PHYSICAL:   Vitals:    09/07/22 1136   BP: 113/65   Pulse: 50   Resp: 16   Temp: 97.1 °F (36.2 °C)   SpO2: 94%     PLANNED PROCEDURE   See procedure note  SEDATION  Planned agent: Versed and Fentanyl  ASA Classification: 2  Class 1: A normal healthy patient  Class 2: Pt with mild to moderate systemic disease  Class 3: Severe systemic disease or disturbance  Class 4: Severe systemic disorders that are already life threatening. Class 5: Moribund pt with little chances of survival, for more than 24 hours. Mallampati I Airway Classification: 2    1. Pre-procedure diagnostic studies complete and results available. 2. Previous sedation/anesthesia experiences assessed. 3. The patient is an appropriate candidate to undergo the planned procedure sedation and anesthesia. (Refer to nursing sedation/analgesia documentation record)  4. Formulation and discussion of sedation/procedure plan, risks, and expectations with patient and/or responsible adult completed. 5. Patient examined immediately prior to the procedure.  (Refer to nursing sedation/analgesia documentation record)    Ines Kenney DO  Electronically signed 9/7/2022 at 1:23 PM

## 2022-09-07 NOTE — PROCEDURES
Pre-operative Diagnosis: Lumbar facet pain     Post-operative Diagnosis: Lumbar facet pain     Procedure: Bilateral thoracic medial branch blocks targeting facet joints of T8/T9 and T9/T10     Procedure Description:  After consent was obtained, the patient was placed in the prone position. The thoracic back was prepped with chloraprep and draped in a sterile fashion. Then, 0.5 cc of 1 % lidocaine was used for local anesthesia of the skin and superficial subcutaneous tissues. Three 22-gauge 3.5-inch spinal needles were advanced under fluoroscopy in an AP view: at the junction of the right superior articular process and the transverse process of the T8, T9, and T10 vertebrae. There was no paresthesias, heme, or CSF obtained. Needle placement was confirmed using AP and oblique views. Then, 0.5 cc of 0.5% bupivacaine was injected at each site without complications. The procedure was repeated on the contralateral side. The patient tolerated the procedure well, transported to the recovery room, and discharged in ambulatory fashion.      Procedural Complications: None  Estimated Blood Loss: 0 mL      IV sedation was used during the procedure:  - Moderate intravenous conscious sedation was supervised by Dr. Octavio Goodpasture  - The patient was independently monitored by a Registered Nurse assigned to the procedure room  - Monitoring included automated blood pressure, continuous EKG, and continuous pulse oximetry  - The detailed conscious record is permanently stored in the [place] [id number]  - The following is the conscious sedation record:  Start Time: 11:27  End Time : 11:42  Duration: 15 minutes   Medications Administered: 2 mg Versed, 50 mcg Fentanyl     Linden Shetty DO  Interventional Pain Management/PM&R   [place]

## 2022-09-07 NOTE — PROGRESS NOTES
1136: Patient arrives to recovery room via cart. Spontaneous respirations, vss, report received from surgical RN. Patient denies pain, numbness, tingling , nausea. Injection site clean, dry, intact. HOB elevated. IV capped. Snack and drink given. Call light within reach. 1150: patient denies needs, getting dressed, iv removed, ride called. 1205: patient ambulated to discharge lobby in stable condition. Patient discharged home with wife.

## 2022-09-07 NOTE — POST SEDATION
[facility]  Sedation/Analgesia Post Sedation Record    Pt Name: Jacqui Neumann  MRN: [id number]  YOB: 1955  Procedure Performed By: Kali Desouza DO  Primary Care Physician: Edilma Hassan MD    POST-PROCEDURE    Physicians/Assistants: Kali Desouza DO  Procedure Performed: See Procedure Note   Sedation/Anesthesia: Versed and Fentanyl (See procedure note for amount and duration)  Estimated Blood Loss:     0  ml  Specimens Removed: None        Complications: None           Linden Forrest DO  Electronically signed 9/7/2022 at 1:23 PM

## 2022-09-09 NOTE — TELEPHONE ENCOUNTER
OARRS reviewed. UDS: n/a  Last seen: 8/25/2022.  Follow-up:   Future Appointments   Date Time Provider Elizabeth Nair   10/7/2022  8:00 AM DO ADEEL Cordon [facility]

## 2022-09-11 RX ORDER — TIZANIDINE 2 MG/1
2 TABLET ORAL 3 TIMES DAILY PRN
Qty: 90 TABLET | Refills: 2 | Status: SHIPPED | OUTPATIENT
Start: 2022-09-11 | End: 2022-10-07 | Stop reason: SDUPTHER

## 2022-09-13 DIAGNOSIS — M62.838 OTHER MUSCLE SPASM: ICD-10-CM

## 2022-09-13 RX ORDER — TIZANIDINE 4 MG/1
4 TABLET ORAL EVERY 8 HOURS PRN
Qty: 270 TABLET | Refills: 0 | OUTPATIENT
Start: 2022-09-13 | End: 2022-12-12

## 2022-09-20 RX ORDER — TIZANIDINE 2 MG/1
2 TABLET ORAL 3 TIMES DAILY PRN
Qty: 90 TABLET | Refills: 2 | OUTPATIENT
Start: 2022-09-20

## 2022-10-05 DIAGNOSIS — M62.838 OTHER MUSCLE SPASM: ICD-10-CM

## 2022-10-06 RX ORDER — TIZANIDINE 4 MG/1
TABLET ORAL
Qty: 270 TABLET | Refills: 0 | OUTPATIENT
Start: 2022-10-06

## 2022-10-07 ENCOUNTER — OFFICE VISIT (OUTPATIENT)
Dept: PHYSICAL MEDICINE AND REHAB | Age: 67
End: 2022-10-07
Payer: MEDICARE

## 2022-10-07 VITALS
DIASTOLIC BLOOD PRESSURE: 84 MMHG | WEIGHT: 212 LBS | HEIGHT: 70 IN | BODY MASS INDEX: 30.35 KG/M2 | SYSTOLIC BLOOD PRESSURE: 116 MMHG

## 2022-10-07 DIAGNOSIS — M96.1 FAILED BACK SURGICAL SYNDROME: ICD-10-CM

## 2022-10-07 DIAGNOSIS — G89.29 OTHER CHRONIC PAIN: Primary | ICD-10-CM

## 2022-10-07 DIAGNOSIS — M62.838 SPASM OF MUSCLE: ICD-10-CM

## 2022-10-07 DIAGNOSIS — M47.814 THORACIC SPONDYLOSIS: ICD-10-CM

## 2022-10-07 PROCEDURE — 1123F ACP DISCUSS/DSCN MKR DOCD: CPT | Performed by: ANESTHESIOLOGY

## 2022-10-07 PROCEDURE — G8427 DOCREV CUR MEDS BY ELIG CLIN: HCPCS | Performed by: ANESTHESIOLOGY

## 2022-10-07 PROCEDURE — 4004F PT TOBACCO SCREEN RCVD TLK: CPT | Performed by: ANESTHESIOLOGY

## 2022-10-07 PROCEDURE — 99214 OFFICE O/P EST MOD 30 MIN: CPT | Performed by: ANESTHESIOLOGY

## 2022-10-07 PROCEDURE — G8417 CALC BMI ABV UP PARAM F/U: HCPCS | Performed by: ANESTHESIOLOGY

## 2022-10-07 PROCEDURE — G8484 FLU IMMUNIZE NO ADMIN: HCPCS | Performed by: ANESTHESIOLOGY

## 2022-10-07 PROCEDURE — 3017F COLORECTAL CA SCREEN DOC REV: CPT | Performed by: ANESTHESIOLOGY

## 2022-10-07 RX ORDER — TIZANIDINE 4 MG/1
4 TABLET ORAL 3 TIMES DAILY PRN
Qty: 90 TABLET | Refills: 2 | Status: SHIPPED | OUTPATIENT
Start: 2022-10-07

## 2022-10-07 RX ORDER — OXYCODONE HYDROCHLORIDE AND ACETAMINOPHEN 5; 325 MG/1; MG/1
1 TABLET ORAL 2 TIMES DAILY PRN
Qty: 60 TABLET | Refills: 0 | Status: SHIPPED | OUTPATIENT
Start: 2022-10-07 | End: 2022-11-03 | Stop reason: SDUPTHER

## 2022-10-07 RX ORDER — TIZANIDINE 2 MG/1
2 TABLET ORAL 3 TIMES DAILY PRN
Qty: 90 TABLET | Refills: 2 | Status: SHIPPED | OUTPATIENT
Start: 2022-10-07

## 2022-10-07 NOTE — PROGRESS NOTES
Chronic Pain/PM&R Clinic Note     Encounter Date: 10/7/22    Subjective:   Chief Complaint:   Chief Complaint   Patient presents with    Follow Up After Procedure     thoracic medial branch blocks targeting facet levels Thoracic 8 9, 9/10 9/7/22       History of Present Illness:   Jay Corrigan is a 74 y.o. male seen in the clinic initially on 07/19/21 upon request from Ede Bush MD for his history of chronic low back pain. He has a medical history of previous L2-S1 lumbar decompression/fusion by Dr. Julia Hanna in 2018, COPD, and anxiety/depression. He has been dealing with chronic low back and right-sided buttocks pain since his back surgery in 2018. He describes his pain to be a constant 9/10 stabbing, pins/needles, burning pain. States that the buttocks pain will radiate to the lateral aspect of the hip and down the lateral aspect and posterior aspect of the thigh. He states his pain is worse with any activity where he is upright walking. His pain is improved with rest and medications. He feels like the right leg is weaker than the left but denies any associated numbness/tingling, saddle anesthesia, bowel/bladder incontinence. He states that he has seen previous pain management ( [facility]). He states that he had a couple different injections including SI joint injection, lumbar epidural steroid injections, and facet injections. He feels like ejections were not helping him out much. Today, 10/7/2022, patient presents for planned follow-up for chronic low back pain and thoracic back pain. He underwent thoracic medial branch blocks on 9/7/2022. He reports no relief from this procedure. He is frustrated overall as he feels like no procedures give him extended relief. He is wondering what are the next steps for pain management and is also asking potentially about spinal cord stimulation.   He states he has been having trouble with his tizanidine dose with his pharmacy and has been out of his 2 mg that he takes on top of his 4 mg. He continues to have a lot of pain around his incision sites and is wondering if he needs a follow-up with the neurosurgery team to further evaluate this. He denies any other new symptoms this point. History of Interventions:   Surgery: Previous L2-S1 decompression/fusion in 2018 (Dr. Susie Genao); Revision T10-pelvis fusion (Dr. Sofia Fan) in 3/2022  Injections: Multiple in past (Dr. Viral Ham)  R SI joint inj (8/3/21) - no relief  R cluneal nerve block (9/28/21) - no relief  Thoracic MBBs (9/7/22) - no relief     Current Treatment Medications:   Gabapentin 1200 mg TID  Tizanidine 6 mg TID PRN    Historical Treatment Medications:   Norco - hives, itching  Tramadol - ineffective  Naproxen - ineffective   Lyrica - ineffective     Imaging:  XR L-spine (4/18/18)    LUMBAR SPINE 2 VIEWS:       CLINICAL INFORMATION: Back pain. Lumbar fusion. COMPARISON: Earlier film same date, 1135 hours       TECHNIQUE: Standard AP and crossfire lateral views of lumbar spine were obtained. FINDINGS: Posterior lumbar fusion has been performed from L2-S1. Metallic pedicle screws and rods are present. A disc spacer device has been inserted at the L5-S1 level. Lumbar vertebra are normally aligned. Impression   Postop appearance lumbar spine. Past Medical History:   Diagnosis Date    Anxiety     Arthritis     Bradycardia     HR 50 on preop EKG    Cervical spondylosis with myelopathy     COPD (chronic obstructive pulmonary disease) (HCC)     breathing worse in the heat    Depression     GERD (gastroesophageal reflux disease)     Headache(784.0)     Hyperlipidemia     Hypertension     Low back pain     was on Xanax from pain clinic - no longer has script    Prolonged emergence from general anesthesia     Snoring     no apnea, BMI 27, neck circ. 15 inches, will wake coughing, no choking, no daytime somnolence    Tobacco abuse        Past Surgical History:   Procedure Laterality Date    BACK INJECTION Right 8/3/2021    right sacroiliac joint injection performed by Haley Ventura DO at [address]  11/2015    cervical fusion C3-5? Dr. Marietta Concepcion Bilateral     Dr. Maximino Rene  2014    FACET JOINT INJECTION N/A 9/7/2022    thoracic medial branch blocks targeting facet levels Thoracic 8 9, 9/10 (above his spinal fusion). performed by Haley Ventura DO at [address] N/A 3/14/2022    Revision previous lumbar spine decompression instrumentation & fusion with extension to the pelvis T10 and pelvis performed by Lokesh Miller MD at [address] Right 9/28/2021    right cluneal nerve block performed by Haley Ventura DO at [address] UNLISTED N/A 4/18/2018    LUMBAR LAMINECTOMY WITH PSF L2-S1, PLIF L5-S1 WITH SOLERA 5.5 CAPSTONE AND OPAL [place] performed by Ally Parker MD at [address] Left 01/2016    Dr. Arana Files @ [facility]    UPPER GASTROINTESTINAL ENDOSCOPY         Family History   Problem Relation Age of Onset    High Blood Pressure Mother     High Blood Pressure Father     Heart Disease Father         CAD    Heart Surgery Father 61        CAGB    COPD Sister     Emphysema Sister     Cancer Sister         Throat CA/bone cancer       Social History     Socioeconomic History    Marital status:      Spouse name: Not on file    Number of children: Not on file    Years of education: Not on file    Highest education level: Not on file   Occupational History    Not on file   Tobacco Use    Smoking status: Every Day     Packs/day: 0.50     Years: 48.00     Pack years: 24.00     Types: Cigarettes    Smokeless tobacco: Never   Vaping Use    Vaping Use: Never used   Substance and Sexual Activity    Alcohol use:  Yes     Alcohol/week: 0.0 standard drinks     Comment: occasionnally    Drug use: No    Sexual activity: Yes   Other Topics Concern    Not on file   Social History Narrative    Not on file     Social Determinants of Health     Financial Resource Strain: Not on file   Food Insecurity: Not on file   Transportation Needs: Not on file   Physical Activity: Not on file   Stress: Not on file   Social Connections: Not on file   Intimate Partner Violence: Not on file   Housing Stability: Not on file       Medications & Allergies:   Current Outpatient Medications   Medication Instructions    albuterol sulfate HFA (PROVENTIL;VENTOLIN;PROAIR) 108 (90 Base) MCG/ACT inhaler 2 puffs, Inhalation, EVERY 4 HOURS PRN    aspirin 81 mg, Oral, DAILY    atorvastatin (LIPITOR) 40 mg, Oral, DAILY    gabapentin (NEURONTIN) 1,200 mg, Oral, 3 TIMES DAILY    gabapentin (NEURONTIN) 1,200 mg, Oral, 3 TIMES DAILY    oxyCODONE-acetaminophen (PERCOCET) 5-325 MG per tablet 1 tablet, Oral, 2 TIMES DAILY PRN    tiotropium (SPIRIVA) 18 mcg, Inhalation, DAILY    tiZANidine (ZANAFLEX) 2 mg, Oral, 3 TIMES DAILY PRN    tiZANidine (ZANAFLEX) 4 mg, Oral, 3 TIMES DAILY PRN       Allergies   Allergen Reactions    Norco [Hydrocodone-Acetaminophen] Hives and Itching       Review of Systems:   Constitutional: negative for weight changes or fevers  Genitourinary: negative for bowel/bladder incontinence   Musculoskeletal: positive for low back pain  Neurological: positive numbness in left leg  Behavioral/Psych: positive for anxiety/depression   All other systems reviewed and are negative    Objective:     Vitals:    10/07/22 0803   BP: 116/84       Constitutional: Pleasant, no acute distress   Head: Normocephalic, atraumatic   Eyes: Conjunctivae normal   Neck: Supple, symmetrical   Lungs: Normal respiratory effort, non-labored breathing   Cardiovascular: Limbs warm and well perfused   Abdomen: Non-protruded   Musculoskeletal: Muscle bulk symmetric, no atrophy, no gross deformities   · Thoracic spine: Tenderness palpation around incision scar.   Positive thoracic facet loading above his fusion. Increased tonicity of muscles paraspinal T8-T10  Neurological: Cranial nerves II-XII grossly intact. No focal motor deficits appreciated in lower limbs  · Gait - Slightly antalgic gait. Ambulates with assistive device. Skin: Healed midline surgical incision scar in back  Psychological: Cooperative, no exaggerated pain behaviors     Assessment:    Diagnosis Orders   1. Other chronic pain  oxyCODONE-acetaminophen (PERCOCET) 5-325 MG per tablet      2. Thoracic spondylosis        3. Failed back surgical syndrome        4. Spasm of muscle                Kathryn Dennis is a 74 y. o.male presenting to the pain clinic for evaluation of chronic low back/right buttocks pain in the setting of previous L2-S1 decompression/fusion in 2018. His clinical history and physical examination are consistent with severe right SI joint dysfunction/pain. I have set him up for a right SI joint injection under fluoroscopy. We may potentially investigate the use of a spinal cord stimulation in the setting of failed back surgical syndrome. He underwent a revision T10 to pelvis fusion with Dr. Satish Yang in March 2022. He has facet mediated pain above his level of fusion. I set him up for diagnostic facet injections at T8/T9 and T9/T10. Given her short prescription for Percocet until we can get him to injection therapy. Plan: The following treatment recommendations and plan were discussed in detail with Kathryn Dennis. Imaging:   I have reviewed patients imaging of MRI thoracic spine results were discussed in detail the patient today. Analgesics:   Due to continued chronic pain refractory to multiple adjuvant medication management and injection therapy, I have restarted the patient on low-dose Percocet 5 mg he can take up to twice a day as needed for severe pain (pain greater than 7/10). We WILL NOT be increasing this medication about twice a day.   Patient advised take this medication as prescribed to take more than prescribed can lead development of tolerance, physical dependence, addiction. OARRS reviewed and is appropriate. Pain contract signed. Adjuvants: In light of the presence of a neuropathic component of pain, I have continued his gabapentin to 1200 mg three times a day. In the presence of musculoskeletal pain/muscle spasms, I have continued his Tizanidine at 6 mg up to 3 times daily. Interventions:   None    Anticoagulation/NPO Recommendations:   None    Multidisciplinary Pain Management:   In the presence of complex, chronic, and multi-factorial pain, the importance of a multidisciplinary approach to pain management in the patients management regimen was emphasized and discussed in great detail.    PHYSICAL THERAPY: I refer patient to physical therapy for posterior chain core strengthening program to work on lumbar paraspinal muscle strengthening    Referrals:  None    Prescriptions Written This Visit:   Percocet 5 mg (#60, 0 refills)  Tizanidine 4 mg   Tizanidine 2 mg    Follow-up: 8 weeks      Sudhakar Barron,   Interventional Pain Management/PM&R   [place]

## 2022-11-03 ENCOUNTER — TELEPHONE (OUTPATIENT)
Dept: PHYSICAL MEDICINE AND REHAB | Age: 67
End: 2022-11-03

## 2022-11-03 DIAGNOSIS — G89.29 OTHER CHRONIC PAIN: ICD-10-CM

## 2022-11-03 RX ORDER — GABAPENTIN 600 MG/1
1200 TABLET ORAL 3 TIMES DAILY
Qty: 180 TABLET | Refills: 2 | Status: SHIPPED | OUTPATIENT
Start: 2022-11-05 | End: 2023-02-03

## 2022-11-03 RX ORDER — OXYCODONE HYDROCHLORIDE AND ACETAMINOPHEN 5; 325 MG/1; MG/1
1 TABLET ORAL 2 TIMES DAILY PRN
Qty: 60 TABLET | Refills: 0 | Status: SHIPPED | OUTPATIENT
Start: 2022-11-06 | End: 2022-12-06

## 2022-11-03 NOTE — TELEPHONE ENCOUNTER
Radlaurence Serum called requesting a refill on the following medications:  Requested Prescriptions     Pending Prescriptions Disp Refills    oxyCODONE-acetaminophen (PERCOCET) 5-325 MG per tablet 60 tablet 0     Sig: Take 1 tablet by mouth 2 times daily as needed for Pain for up to 30 days. gabapentin (NEURONTIN) 600 MG tablet 180 tablet 2     Sig: Take 2 tablets by mouth 3 times daily for 90 days. [facility]  .       Date of last visit: 10/07/2022  Date of next visit (if applicable): 78/4/5084

## 2022-11-03 NOTE — TELEPHONE ENCOUNTER
OARRS reviewed. UDS: negative   Last seen: 10/7/2022.  Follow-up:   Future Appointments   Date Time Provider Elizabeth Nair   12/2/2022  8:00 AM DO ADEEL Calderón [facility] [place]

## 2022-11-07 DIAGNOSIS — G89.29 OTHER CHRONIC PAIN: ICD-10-CM

## 2022-11-07 RX ORDER — TIZANIDINE 2 MG/1
2 TABLET ORAL 3 TIMES DAILY PRN
Qty: 90 TABLET | Refills: 2 | OUTPATIENT
Start: 2022-11-07

## 2022-11-07 NOTE — TELEPHONE ENCOUNTER
OARRS reviewed. UDS: negative   Last seen: 10/7/2022.  Follow-up:   Future Appointments   Date Time Provider Elizabeth Nair   12/2/2022  8:00 AM Linden Worthy DO N [facility] - [facility]

## 2022-11-08 RX ORDER — OXYCODONE HYDROCHLORIDE AND ACETAMINOPHEN 5; 325 MG/1; MG/1
1 TABLET ORAL 2 TIMES DAILY PRN
Qty: 60 TABLET | Refills: 0 | OUTPATIENT
Start: 2022-11-08 | End: 2022-12-08

## 2022-12-02 DIAGNOSIS — G89.29 OTHER CHRONIC PAIN: ICD-10-CM

## 2022-12-02 RX ORDER — OXYCODONE HYDROCHLORIDE AND ACETAMINOPHEN 5; 325 MG/1; MG/1
1 TABLET ORAL 2 TIMES DAILY PRN
Qty: 60 TABLET | Refills: 0 | OUTPATIENT
Start: 2022-12-02 | End: 2023-01-01

## 2022-12-02 NOTE — TELEPHONE ENCOUNTER
Drew Moreno called requesting a refill on the following medications:  Requested Prescriptions     Pending Prescriptions Disp Refills    oxyCODONE-acetaminophen (PERCOCET) 5-325 MG per tablet 60 tablet 0     Sig: Take 1 tablet by mouth 2 times daily as needed for Pain for up to 30 days.      Pharmacy verified:  [facility] [place]      Date of last visit: 10-07-22  Date of next visit (if applicable): 3/94/5769

## 2022-12-05 DIAGNOSIS — G89.29 OTHER CHRONIC PAIN: ICD-10-CM

## 2022-12-05 RX ORDER — OXYCODONE HYDROCHLORIDE AND ACETAMINOPHEN 5; 325 MG/1; MG/1
1 TABLET ORAL 2 TIMES DAILY PRN
Qty: 60 TABLET | Refills: 0 | OUTPATIENT
Start: 2022-12-05 | End: 2023-01-04

## 2022-12-06 RX ORDER — TIZANIDINE 4 MG/1
4 TABLET ORAL 3 TIMES DAILY PRN
Qty: 270 TABLET | OUTPATIENT
Start: 2022-12-06

## 2023-01-20 RX ORDER — TIZANIDINE 4 MG/1
4 TABLET ORAL 3 TIMES DAILY PRN
Qty: 90 TABLET | Refills: 0 | Status: SHIPPED | OUTPATIENT
Start: 2023-01-20 | End: 2023-02-19

## 2023-01-20 RX ORDER — TIZANIDINE 2 MG/1
2 TABLET ORAL 3 TIMES DAILY PRN
Qty: 90 TABLET | Refills: 0 | Status: SHIPPED | OUTPATIENT
Start: 2023-01-20 | End: 2023-02-19

## 2023-01-20 NOTE — TELEPHONE ENCOUNTER
OARRS reviewed. UDS: + for  Negative. Last seen: 10/7/2022.  Follow-up:   Future Appointments   Date Time Provider Elizabeth Nair   1/25/2023  8:00 AM DO ADEEL Washington [facility] - [place]

## 2023-01-25 ENCOUNTER — OFFICE VISIT (OUTPATIENT)
Dept: PHYSICAL MEDICINE AND REHAB | Age: 68
End: 2023-01-25
Payer: MEDICARE

## 2023-01-25 VITALS
BODY MASS INDEX: 30.35 KG/M2 | WEIGHT: 212 LBS | DIASTOLIC BLOOD PRESSURE: 70 MMHG | HEIGHT: 70 IN | SYSTOLIC BLOOD PRESSURE: 112 MMHG

## 2023-01-25 DIAGNOSIS — G89.29 OTHER CHRONIC PAIN: Primary | ICD-10-CM

## 2023-01-25 DIAGNOSIS — M47.814 THORACIC SPONDYLOSIS: ICD-10-CM

## 2023-01-25 DIAGNOSIS — T14.8XXA MUSCLE STRAIN: ICD-10-CM

## 2023-01-25 DIAGNOSIS — M96.1 FAILED BACK SURGICAL SYNDROME: ICD-10-CM

## 2023-01-25 PROCEDURE — G8427 DOCREV CUR MEDS BY ELIG CLIN: HCPCS | Performed by: ANESTHESIOLOGY

## 2023-01-25 PROCEDURE — 3074F SYST BP LT 130 MM HG: CPT | Performed by: ANESTHESIOLOGY

## 2023-01-25 PROCEDURE — G8417 CALC BMI ABV UP PARAM F/U: HCPCS | Performed by: ANESTHESIOLOGY

## 2023-01-25 PROCEDURE — G8484 FLU IMMUNIZE NO ADMIN: HCPCS | Performed by: ANESTHESIOLOGY

## 2023-01-25 PROCEDURE — 3017F COLORECTAL CA SCREEN DOC REV: CPT | Performed by: ANESTHESIOLOGY

## 2023-01-25 PROCEDURE — 1123F ACP DISCUSS/DSCN MKR DOCD: CPT | Performed by: ANESTHESIOLOGY

## 2023-01-25 PROCEDURE — 3078F DIAST BP <80 MM HG: CPT | Performed by: ANESTHESIOLOGY

## 2023-01-25 PROCEDURE — 99214 OFFICE O/P EST MOD 30 MIN: CPT | Performed by: ANESTHESIOLOGY

## 2023-01-25 PROCEDURE — 4004F PT TOBACCO SCREEN RCVD TLK: CPT | Performed by: ANESTHESIOLOGY

## 2023-01-25 RX ORDER — OXYCODONE HYDROCHLORIDE AND ACETAMINOPHEN 5; 325 MG/1; MG/1
1 TABLET ORAL 2 TIMES DAILY PRN
Qty: 60 TABLET | Refills: 0 | Status: SHIPPED | OUTPATIENT
Start: 2023-01-25 | End: 2023-02-24

## 2023-01-25 NOTE — PROGRESS NOTES
Chronic Pain/PM&R Clinic Note     Encounter Date: 1/25/23    Subjective:   Chief Complaint:   Chief Complaint   Patient presents with    Follow-up     8wk f/u -- rs from 12-02  Pain in back is terrible       History of Present Illness:   Ryann Hope is a 74 y.o. male seen in the clinic initially on 07/19/21 upon request from 2020 Alena Koo MD for his history of chronic low back pain. He has a medical history of previous L2-S1 lumbar decompression/fusion by Dr. Dan Fleischer in 2018, COPD, and anxiety/depression. He has been dealing with chronic low back and right-sided buttocks pain since his back surgery in 2018. He describes his pain to be a constant 9/10 stabbing, pins/needles, burning pain. States that the buttocks pain will radiate to the lateral aspect of the hip and down the lateral aspect and posterior aspect of the thigh. He states his pain is worse with any activity where he is upright walking. His pain is improved with rest and medications. He feels like the right leg is weaker than the left but denies any associated numbness/tingling, saddle anesthesia, bowel/bladder incontinence. He states that he has seen previous pain management (Dr. Aaron Knox). He states that he had a couple different injections including SI joint injection, lumbar epidural steroid injections, and facet injections. He feels like ejections were not helping him out much. Today, 1/25/2023, patient presents for planned chronic low back and thoracic back pain. He states he missed his appointment on 12/2/2022 due to an upper respiratory infection and thought it was COVID but states that he had this ruled out that day with a COVID swab. He continues to have a lot of pain in his thoracic back region but states that he felt a pop when he bent over 1 day a few weeks back and his left-sided low back/flank region.   He states that he has been recovering from this injury and actually went to the ER for this and was told that he \"popped his sciatic nerve. \"  He feels like he may have injured a muscle and is wondering how he can rehab this. He feels like when he was on Percocet in combination with his gabapentin and tizanidine he had the most pain relief. He is wondering if he can get restarted back on this medication. He denies any other new symptoms. History of Interventions:   Surgery: Previous L2-S1 decompression/fusion in 2018 (Dr. Mani Vazquez); Revision T10-pelvis fusion (Dr. Jenniffer Cevallos) in 3/2022  Injections: Multiple in past (Dr. Sissy Boyd)  R SI joint inj (8/3/21) - no relief  R cluneal nerve block (9/28/21) - no relief  Thoracic MBBs (9/7/22) - no relief     Current Treatment Medications:   Gabapentin 1200 mg TID  Tizanidine 6 mg TID PRN    Historical Treatment Medications:   Norco - hives, itching  Tramadol - ineffective  Naproxen - ineffective   Lyrica - ineffective     Imaging:  XR L-spine (4/18/18)    LUMBAR SPINE 2 VIEWS:       CLINICAL INFORMATION: Back pain. Lumbar fusion. COMPARISON: Earlier film same date, 1135 hours       TECHNIQUE: Standard AP and crossfire lateral views of lumbar spine were obtained. FINDINGS: Posterior lumbar fusion has been performed from L2-S1. Metallic pedicle screws and rods are present. A disc spacer device has been inserted at the L5-S1 level. Lumbar vertebra are normally aligned. Impression   Postop appearance lumbar spine. Past Medical History:   Diagnosis Date    Anxiety     Arthritis     Bradycardia     HR 50 on preop EKG    Cervical spondylosis with myelopathy     COPD (chronic obstructive pulmonary disease) ([facility])     breathing worse in the heat    Depression     GERD (gastroesophageal reflux disease)     Headache(784.0)     Hyperlipidemia     Hypertension     Low back pain     was on Xanax from pain clinic - no longer has script    Prolonged emergence from general anesthesia     Snoring     no apnea, BMI 27, neck circ. 15 inches, will wake coughing, no choking, no daytime somnolence    Tobacco abuse        Past Surgical History:   Procedure Laterality Date    BACK INJECTION Right 8/3/2021    right sacroiliac joint injection performed by Alyssa Lara DO at [address]  11/2015    cervical fusion C3-5? Dr. José Vega Bilateral     Dr. Wing Greenfield  2014    FACET JOINT INJECTION N/A 9/7/2022    thoracic medial branch blocks targeting facet levels Thoracic 8 9, 9/10 (above his spinal fusion). performed by Alyssa Lara DO at [address] [facility] N/A 3/14/2022    Revision previous lumbar spine decompression instrumentation & fusion with extension to the pelvis T10 and pelvis performed by Carina Clarke MD at [address] [place] [address] Right 9/28/2021    right cluneal nerve block performed by Alyssa Lara DO at [place] [address] N/A 4/18/2018    LUMBAR LAMINECTOMY WITH PSF L2-S1, PLIF L5-S1 WITH SOLERA 5.5 CAPSTONE AND OPAL [place] performed by Regine Richey MD at [address] [place] [address] Left 01/2016    Dr. Viola Sosa @ [facility]    UPPER GASTROINTESTINAL ENDOSCOPY         Family History   Problem Relation Age of Onset    High Blood Pressure Mother     High Blood Pressure Father     Heart Disease Father         CAD    Heart Surgery Father 61        CAGB    COPD Sister     Emphysema Sister     Cancer Sister         Throat CA/bone cancer       Social History     Socioeconomic History    Marital status:      Spouse name: Not on file    Number of children: Not on file    Years of education: Not on file    Highest education level: Not on file   Occupational History    Not on file   Tobacco Use    Smoking status: Every Day     Packs/day: 0.50     Years: 48.00     Pack years: 24.00     Types: Cigarettes    Smokeless tobacco: Never   Vaping Use    Vaping Use: Never used   Substance and Sexual Activity    Alcohol use:  Yes Alcohol/week: 0.0 standard drinks     Comment: occasionnally    Drug use: No    Sexual activity: Yes   Other Topics Concern    Not on file   Social History Narrative    Not on file     Social Determinants of Health     Financial Resource Strain: Not on file   Food Insecurity: Not on file   Transportation Needs: Not on file   Physical Activity: Not on file   Stress: Not on file   Social Connections: Not on file   Intimate Partner Violence: Not on file   Housing Stability: Not on file       Medications & Allergies:   Current Outpatient Medications   Medication Instructions    albuterol sulfate HFA (PROVENTIL;VENTOLIN;PROAIR) 108 (90 Base) MCG/ACT inhaler 2 puffs, Inhalation, EVERY 4 HOURS PRN    atorvastatin (LIPITOR) 40 mg, Oral, DAILY    gabapentin (NEURONTIN) 1,200 mg, Oral, 3 TIMES DAILY    gabapentin (NEURONTIN) 1,200 mg, Oral, 3 TIMES DAILY    oxyCODONE-acetaminophen (PERCOCET) 5-325 MG per tablet 1 tablet, Oral, 2 TIMES DAILY PRN    tiotropium (SPIRIVA) 18 mcg, Inhalation, DAILY    tiZANidine (ZANAFLEX) 2 mg, Oral, 3 TIMES DAILY PRN    tiZANidine (ZANAFLEX) 4 mg, Oral, 3 TIMES DAILY PRN       Allergies   Allergen Reactions    Norco [Hydrocodone-Acetaminophen] Hives and Itching       Review of Systems:   Constitutional: negative for weight changes or fevers  Genitourinary: negative for bowel/bladder incontinence   Musculoskeletal: positive for low back pain  Neurological: positive numbness in left leg  Behavioral/Psych: positive for anxiety/depression   All other systems reviewed and are negative    Objective:     Vitals:    01/25/23 0746   BP: 112/70       Constitutional: Pleasant, no acute distress   Head: Normocephalic, atraumatic   Eyes: Conjunctivae normal   Neck: Supple, symmetrical   Lungs: Normal respiratory effort, non-labored breathing   Cardiovascular: Limbs warm and well perfused   Abdomen: Non-protruded   Musculoskeletal: Muscle bulk symmetric, no atrophy, no gross deformities   · Thoracic spine: Tenderness palpation around incision scar. Positive thoracic facet loading above his fusion. Increased tonicity of muscles paraspinal T8-T10  Neurological: Cranial nerves II-XII grossly intact. No focal motor deficits appreciated in lower limbs  · Gait - Slightly antalgic gait. Ambulates with assistive device. Skin: Healed midline surgical incision scar in back  Psychological: Cooperative, no exaggerated pain behaviors     Assessment:    Diagnosis Orders   1. Other chronic pain  oxyCODONE-acetaminophen (PERCOCET) 5-325 MG per tablet      2. Thoracic spondylosis        3. Failed back surgical syndrome        4. Muscle strain              Katheryn Muse is a 74 y. o.male presenting to the pain clinic for evaluation of chronic low back/right buttocks pain in the setting of previous L2-S1 decompression/fusion in 2018. His clinical history and physical examination are consistent with severe right SI joint dysfunction/pain. I have set him up for a right SI joint injection under fluoroscopy. We may potentially investigate the use of a spinal cord stimulation in the setting of failed back surgical syndrome. He underwent a revision T10 to pelvis fusion with Dr. Angelica Newman in March 2022. He has facet mediated pain above his level of fusion. He unfortunately sustained a left quadratus lumborum strain and is recovering from this. I have refilled his medications and patient must stay compliant with office visits if he is to continue his opioid medications. Plan: The following treatment recommendations and plan were discussed in detail with Katheryn Muse. Imaging:   I have reviewed patients imaging of MRI thoracic spine results were discussed in detail the patient today.     Analgesics:   Due to continued chronic pain refractory to multiple adjuvant medication management and injection therapy, I have restarted the patient on low-dose Percocet 5 mg he can take up to twice a day as needed for severe pain (pain greater than 7/10). We WILL NOT be increasing this medication above twice a day. Patient advised take this medication as prescribed to take more than prescribed can lead development of tolerance, physical dependence, addiction. OARRS reviewed and is appropriate. Pain contract signed. Adjuvants: In light of the presence of a neuropathic component of pain, I have continued his gabapentin to 1200 mg three times a day. In the presence of musculoskeletal pain/muscle spasms, I have continued his Tizanidine at 6 mg up to 3 times daily. Interventions:   None    Anticoagulation/NPO Recommendations:   None    Multidisciplinary Pain Management:   In the presence of complex, chronic, and multi-factorial pain, the importance of a multidisciplinary approach to pain management in the patients management regimen was emphasized and discussed in great detail.    PHYSICAL THERAPY: I refer patient to physical therapy for posterior chain core strengthening program to work on lumbar paraspinal muscle strengthening    Referrals:  None    Prescriptions Written This Visit:   Percocet 5 mg (#60, 0 refills)    Follow-up: 8 weeks      Rachael Esposito,   Interventional Pain Management/PM&R   [place]

## 2023-02-20 RX ORDER — TIZANIDINE 4 MG/1
TABLET ORAL
Qty: 90 TABLET | Refills: 0 | Status: SHIPPED | OUTPATIENT
Start: 2023-02-20 | End: 2023-03-22

## 2023-02-20 RX ORDER — GABAPENTIN 600 MG/1
1200 TABLET ORAL 3 TIMES DAILY
Qty: 180 TABLET | Refills: 2 | Status: SHIPPED | OUTPATIENT
Start: 2023-02-20 | End: 2023-03-22

## 2023-02-20 NOTE — TELEPHONE ENCOUNTER
OARRS reviewed. UDS: negative   Last seen: 1/25/2023.  Follow-up:   Future Appointments   Date Time Provider Elizabeth Nair   3/27/2023  8:20 AM DO ADEEL Barney [facility] [address] [facility]

## 2023-02-21 DIAGNOSIS — G89.29 OTHER CHRONIC PAIN: ICD-10-CM

## 2023-02-21 RX ORDER — OXYCODONE HYDROCHLORIDE AND ACETAMINOPHEN 5; 325 MG/1; MG/1
1 TABLET ORAL 2 TIMES DAILY PRN
Qty: 60 TABLET | Refills: 0 | Status: SHIPPED | OUTPATIENT
Start: 2023-02-23 | End: 2023-03-25

## 2023-02-21 NOTE — TELEPHONE ENCOUNTER
OARRS reviewed. UDS: + for  No Screen  Last seen: 1/25/2023.  Follow-up:   Future Appointments   Date Time Provider Elizabeth Nair   3/27/2023  8:20 AM DO ADEEL Craven SR[facility] Pain [facility] - [place]

## 2023-02-28 ENCOUNTER — TELEPHONE (OUTPATIENT)
Dept: PHYSICAL MEDICINE AND REHAB | Age: 68
End: 2023-02-28

## 2023-02-28 NOTE — TELEPHONE ENCOUNTER
Patient is calling in regarding the oxycodone prescription that was [facility].  [facility] and unsure when [facility] will get more [facility]. Can that one be cancelled and can the prescription be sent to [facility] in [facility] this time? Please advise.

## 2023-03-20 RX ORDER — TIZANIDINE 4 MG/1
TABLET ORAL
Qty: 90 TABLET | Refills: 0 | Status: SHIPPED | OUTPATIENT
Start: 2023-03-22 | End: 2023-04-21

## 2023-03-20 NOTE — TELEPHONE ENCOUNTER
OARRS reviewed. UDS: negative. Last seen: 1/25/2023.  Follow-up:   Future Appointments   Date Time Provider Elizabeth Joanie   3/27/2023  8:20 AM DO ADEEL Fuller [facility] - [place]

## 2023-03-27 ENCOUNTER — OFFICE VISIT (OUTPATIENT)
Dept: PHYSICAL MEDICINE AND REHAB | Age: 68
End: 2023-03-27
Payer: MEDICARE

## 2023-03-27 VITALS
HEIGHT: 70 IN | SYSTOLIC BLOOD PRESSURE: 122 MMHG | DIASTOLIC BLOOD PRESSURE: 80 MMHG | BODY MASS INDEX: 30.35 KG/M2 | WEIGHT: 212 LBS

## 2023-03-27 DIAGNOSIS — G89.29 OTHER CHRONIC PAIN: Primary | ICD-10-CM

## 2023-03-27 DIAGNOSIS — M96.1 FAILED BACK SURGICAL SYNDROME: ICD-10-CM

## 2023-03-27 DIAGNOSIS — M47.814 THORACIC SPONDYLOSIS: ICD-10-CM

## 2023-03-27 DIAGNOSIS — M54.6 DISCOGENIC THORACIC PAIN: ICD-10-CM

## 2023-03-27 PROCEDURE — 99214 OFFICE O/P EST MOD 30 MIN: CPT | Performed by: ANESTHESIOLOGY

## 2023-03-27 PROCEDURE — 1123F ACP DISCUSS/DSCN MKR DOCD: CPT | Performed by: ANESTHESIOLOGY

## 2023-03-27 PROCEDURE — 3017F COLORECTAL CA SCREEN DOC REV: CPT | Performed by: ANESTHESIOLOGY

## 2023-03-27 PROCEDURE — 4004F PT TOBACCO SCREEN RCVD TLK: CPT | Performed by: ANESTHESIOLOGY

## 2023-03-27 PROCEDURE — G8484 FLU IMMUNIZE NO ADMIN: HCPCS | Performed by: ANESTHESIOLOGY

## 2023-03-27 PROCEDURE — G8417 CALC BMI ABV UP PARAM F/U: HCPCS | Performed by: ANESTHESIOLOGY

## 2023-03-27 PROCEDURE — 3079F DIAST BP 80-89 MM HG: CPT | Performed by: ANESTHESIOLOGY

## 2023-03-27 PROCEDURE — G8427 DOCREV CUR MEDS BY ELIG CLIN: HCPCS | Performed by: ANESTHESIOLOGY

## 2023-03-27 PROCEDURE — 3074F SYST BP LT 130 MM HG: CPT | Performed by: ANESTHESIOLOGY

## 2023-03-27 NOTE — PROGRESS NOTES
occasionnally    Drug use: No    Sexual activity: Yes   Other Topics Concern    Not on file   Social History Narrative    Not on file     Social Determinants of Health     Financial Resource Strain: Not on file   Food Insecurity: Not on file   Transportation Needs: Not on file   Physical Activity: Not on file   Stress: Not on file   Social Connections: Not on file   Intimate Partner Violence: Not on file   Housing Stability: Not on file       Medications & Allergies:   Current Outpatient Medications   Medication Instructions    albuterol sulfate HFA (PROVENTIL;VENTOLIN;PROAIR) 108 (90 Base) MCG/ACT inhaler 2 puffs, Inhalation, EVERY 4 HOURS PRN    atorvastatin (LIPITOR) 40 mg, Oral, DAILY    gabapentin (NEURONTIN) 1,200 mg, Oral, 3 TIMES DAILY    oxyCODONE-acetaminophen (PERCOCET) 5-325 MG per tablet 1 tablet, Oral, 2 TIMES DAILY PRN    tiotropium (SPIRIVA) 18 mcg, Inhalation, DAILY    tiZANidine (ZANAFLEX) 4 MG tablet TAKE 1 TABLET BY MOUTH 3 TIMES DAILY AS NEEDED (FOR MUSCLE SPASMS)       Allergies   Allergen Reactions    Norco [Hydrocodone-Acetaminophen] Hives and Itching       Review of Systems:   Constitutional: negative for weight changes or fevers  Genitourinary: negative for bowel/bladder incontinence   Musculoskeletal: positive for back and leg pain  Neurological: positive numbness in right and left leg  Behavioral/Psych: positive for anxiety/depression   All other systems reviewed and are negative    Objective:     Vitals:    03/27/23 0823   BP: 122/80       Constitutional: Pleasant, no acute distress   Head: Normocephalic, atraumatic   Eyes: Conjunctivae normal   Neck: Supple, symmetrical   Lungs: Normal respiratory effort, non-labored breathing   Cardiovascular: Limbs warm and well perfused   Abdomen: Non-protruded   Musculoskeletal: Muscle bulk symmetric, no atrophy, no gross deformities   · Thoracic spine: Tenderness palpation around incision scar. Positive thoracic facet loading above his fusion.

## 2023-03-28 DIAGNOSIS — G89.29 OTHER CHRONIC PAIN: ICD-10-CM

## 2023-03-28 RX ORDER — TIZANIDINE 2 MG/1
2 TABLET ORAL 3 TIMES DAILY PRN
Qty: 90 TABLET | Refills: 0 | OUTPATIENT
Start: 2023-03-28 | End: 2023-04-27

## 2023-03-30 RX ORDER — OXYCODONE HYDROCHLORIDE AND ACETAMINOPHEN 5; 325 MG/1; MG/1
1 TABLET ORAL 2 TIMES DAILY PRN
Qty: 60 TABLET | Refills: 0 | OUTPATIENT
Start: 2023-03-30 | End: 2023-04-29

## 2023-03-31 ENCOUNTER — CLINICAL DOCUMENTATION (OUTPATIENT)
Dept: PHYSICAL MEDICINE AND REHAB | Age: 68
End: 2023-03-31

## 2023-03-31 NOTE — PROGRESS NOTES
Patient had recent urine drug screen on 3/27/2023 which did not reveal gabapentin or oxycodone in his urine drug screen. These 2 medications were being prescribed by me. At this point patient has been noncompliant with his treatment plan and violated his pain contract agreement. Patient will be dismissed from our practice and be notified via letter in the mail.       Saud Choi,   Interventional Pain Management/PM&R   [place]

## 2023-06-22 RX ORDER — TIZANIDINE 2 MG/1
2 TABLET ORAL 3 TIMES DAILY PRN
Qty: 90 TABLET | Refills: 0 | OUTPATIENT
Start: 2023-06-22

## 2023-08-01 RX ORDER — TIZANIDINE 2 MG/1
2 TABLET ORAL 3 TIMES DAILY PRN
Qty: 90 TABLET | Refills: 0 | OUTPATIENT
Start: 2023-08-01

## 2023-08-18 ENCOUNTER — NURSE TRIAGE (OUTPATIENT)
Dept: OTHER | Facility: CLINIC | Age: 68
End: 2023-08-18

## 2023-08-18 NOTE — TELEPHONE ENCOUNTER
Location of patient: [place]    Subjective: Caller states \"I call the ER and they transferred me to you - he has had migraines in the past\"     Current Symptoms: numbness on the left arm and shoulder, shortness of breath, not acting like himself    Onset: 1 day ago;     Associated Symptoms: NA    Pain Severity: 0/10; N/A; none    What has been tried: Nothing    LMP: NA Pregnant: NA    Recommended disposition: Call  Now (wife to drive)    Care advice provided, patient verbalizes understanding; denies any other questions or concerns; instructed to call back for any new or worsening symptoms. Patient/caller agrees to proceed to nearest Emergency Department    This triage is a result of a call to Alexi salas Nurse. Please do not respond to the triage nurse through this encounter. Any subsequent communication should be directly with the patient.       Reason for Disposition   New neurologic deficit that is present NOW, sudden onset of ANY of the following: * Weakness of the face, arm, or leg on one side of the body* Numbness of the face, arm, or leg on one side of the body* Loss of speech or garbled speech    Protocols used: Neurologic Deficit-ADULT-[place]

## 2023-08-25 RX ORDER — TIZANIDINE 2 MG/1
2 TABLET ORAL 3 TIMES DAILY PRN
Qty: 90 TABLET | Refills: 0 | OUTPATIENT
Start: 2023-08-25

## 2023-08-26 NOTE — PROCEDURES
Pre-operative Diagnosis:  Right SI joint pain     Post-operative Diagnosis: Right SI joint pain     Procedure: Right SI joint injection     Procedure Description:  After having signed the informed consent, the patient was placed in the prone position. The patient's back was prepped with chloraprep solution, and draped in a sterile fashion. A total of 2 ml of 1% lidocaine were used to anesthetize the skin and underlying tissues. Under fluoroscopic guidance a single 22-gauge, 3.5 inch spinal needle was advanced to lie within the inferior pole of the right sacroiliac joint. There were no paresthesias or heme aspiration. Needle placement was confirmed in the AP view. After negative aspiration, 0.5 ml of Omnipaque 300 contrast was injected with appropriate spread observed. A total of 4 ml of 0.5% bupivicaine mixed with 40 mg depo-medrol were injected into the sacroiliac joint. The needle was withdrawn without any complications. The patient tolerated the procedure well, was transported to the recovery room and observed for 15 minutes and discharged in an ambulatory fashion. No immediate reported complications.     Procedural Complications: None  Estimated Blood Loss: 0 mL    IV sedation was used during the procedure:  - Moderate intravenous conscious sedation was supervised by Dr. Mily Dacosta  - The patient was independently monitored by a Registered Nurse assigned to the procedure room  - Monitoring included automated blood pressure, continuous EKG, and continuous pulse oximetry  - The detailed conscious record is permanently stored in the [place] [address]  - The following is the conscious sedation record:  Start Time: 11:45  End Time : 12:00  Duration: 15 minutes   Medications Administered: 1 mg Versed, 50 mcg Fentanyl    Linden Shetty DO  Interventional Pain Management/PM&R   [facility]
Medical decision making

## 2023-10-03 NOTE — PROGRESS NOTES
CRITICAL CARE PROGRESS NOTE      Patient:  Dennise Early    Unit/Bed:4D-12/012-A  YOB: 1955  MRN: [id number]   PCP: Queenie Lester MD  Date of Admission: 3/14/2022  Chief Complaint:- acute respiratory failure    Assessment and Plan:    1. Acute pulmonary insufficiency following surgery:  3/14/2022 Patient arrived to the ICU s/p OR intervention intubated. Continue with lung protection strategies targeting a peak pressure 35 and less and plateau of 30 and less. Extubated successfully, currently following commands   2. Post laminectomy syndrome/chronic pain:  3/15/2022 Revision previous lumbar spine decompression instrumentation and fusion with extension to the pelvis T10 and pelvis. EBL 2000 ML. Hemovac X2 to continue Left and Right medial back. Maintain MAP>85. Dr. Cb Estevez to manage. Last 12 hrs draining 477 cc.  3. Acute blood loss anemia:  Monitor and trend target H/H 7.0  4. Impaired glucose tolerange:  Repeat BMP if glucose 150 or greater will need to evaluate with HgBA1c. 3/14/2022 Decadron was given intra-operatively. 5. Essential HPTN:  History, Lisinopril continued with parameters to hold. 6. COPD:  Stable, history, monitor. No PFT found in Epic. Albuterol prn. INITIAL H AND P AND ICU COURSE:  Rema Ignacio is a 77-year-old  male admitted to [facility] ICU on 3/14/2022 status post or intervention. Patient has past medical history significant for a smoker, COPD, Essential HPTN, Bradycardia 3/1/2022 -Lexiscan stress test negative for ischemia. ECHO LVEF 55% with grade 1 diastolic dysfunction,  Dyslipidemia, GERD, Depression, Anxiety. Zen Goss presented to [facility] 3/14/2022 under the care of Dr. Cb Estevez with complaint of severe back pain. This is aggravated with standing and activity with radiation to both hips. 9/10. Patient under went surgical intervention of revision previous lumbar spine decompression instrumentation and fusion with extension to the pelvis T10 and pelvis.  Patient was transferred to the ICU post operatively for further management and care. Last 12 hours Assessment  Bedside assessment. No ventilatory support, no vasopressor support. Patient with adequate pain modulation, successfully extubated, no motor or sensitive focalization, tolerating precedex weaning   No external signs of bleeding, drainage in place, Hb control > 7 mg dl. Past Medical History: See HPI. Family History: Mother , father . Social History: Current everyday smoker, denies any alcohol or illicit drug use. Patient is     ROS   SKN: Positive for posterior back dressing  HEAD: No recent injury  EYES: No drainage  EARS: No drainage  NOSE/THROAT: No nasal drainage, positive for poor dentition  NECK: No lumps or unusual neck stiffness  CARDIAC: Normal.  GI: No melena or hematochezia, no diarrhea or constipation  PERIPHERAL VASCULAR: No intermittent claudication or unusual leg cramps  MUSCULOSKELETAL: Occasional arthralgias, myalgias  NEUROLOGICAL: No Seizures or Syncope  HEMATOLOGIC:  No unusual bruising or bleeding  PSYCH: No homicidal or suicidal ideations    Scheduled Meds:   chlorhexidine  15 mL Mouth/Throat BID    famotidine (PEPCID) injection  20 mg IntraVENous BID    sodium chloride flush  5-40 mL IntraVENous 2 times per day    enoxaparin  40 mg SubCUTAneous Q24H    gabapentin  800 mg Oral TID    lisinopril  5 mg Oral Daily     Continuous Infusions:   sodium chloride      sodium chloride 125 mL/hr at 03/15/22 0443    dexmedetomidine Stopped (03/15/22 0129)    norepinephrine Stopped (03/15/22 0111)    sodium chloride         PHYSICAL EXAMINATION:  T:  99.  P: 88. RR: 19. B/P: 144/77. FiO2: 21. O2 Sat: 93.  I/O: 6518cc/4395cc T +4395 cc   Body mass index is 29.48 kg/m². GCS:   15/15    HEENT:  normocephalic and atraumatic. No scleral icterus, sclera is reddened bilaterally. PERR, poor dentition  Neck: supple. No Thyromegaly.   Lungs: clear to auscultation-coarse breath sounds throughout. No retractions. Cardiac: RRR-tachycardic no JVD. Abdomen: soft. Nontender, bowel sounds present. Extremities:  No clubbing, cyanosis, or edema x 4. Back: Accordion drain x2 present from back incision. Noted mild edema along incisional line. Dressing over incisional line no active bleeding ,small amount of sanguinous drainage noted. Vasculature: capillary refill < 3 seconds. Palpable dorsalis pedis pulses. Skin:  warm and dry. Psych: Affect appropriate  Lymph:  No supraclavicular adenopathy. Neurologic:  No focal deficit. No seizures. Spontaneous movement x4 extremities,no sensitive or motor deficit. .    Data: (All radiographs, tracings, PFTs, and imaging are personally viewed and interpreted unless otherwise noted).  3/15/2022 Na 143, K 4.3, Chl 112, BUN 23 Creat 0.7 , GAP 12, Gluc 122, Ca 7.7    3/15/2022 Albumin 3.7, AST 14, AST 32    3/15/2022 WBC 12.4, Hb 10.7 Hcto 32.6 Plat 164       Seen with multidisciplinary ICU team yes. Meets Continued ICU Level Care Criteria:    [x] Yes   [] No - Transfer Planned to listed location:  [] HOSPITALIST CONTACTED- DR             Electronically signed by Miguel Angel Ignacio MD  Patient seen by me including key components of medical care. Case discussed with resident physician. Patient doing well postoperatively. Transition to medical floor. .  Italicized font, if present,  represents changes to the note made by me. CC time 35 minutes. Time was discontiguous. Time does not include procedure. Time does include my direct assessment of the patient and coordination of care. Time represents more than 50% of the time involved with patient care by the [address] [place] [address] team.  Electronically signed by Alivia Hollingsworth.  Elisabet Angulo MD. Epidermal Autograft Text: The defect edges were debeveled with a #15 scalpel blade.  Given the location of the defect, shape of the defect and the proximity to free margins an epidermal autograft was deemed most appropriate.  Using a sterile surgical marker, the primary defect shape was transferred to the donor site. The epidermal graft was then harvested.  The skin graft was then placed in the primary defect and oriented appropriately.

## 2024-01-10 RX ORDER — TIZANIDINE 2 MG/1
2 TABLET ORAL 3 TIMES DAILY PRN
Qty: 90 TABLET | Refills: 0 | OUTPATIENT
Start: 2024-01-10

## 2024-02-11 RX ORDER — TIZANIDINE 2 MG/1
2 TABLET ORAL 3 TIMES DAILY PRN
Qty: 90 TABLET | Refills: 0 | OUTPATIENT
Start: 2024-02-11

## 2024-04-10 ENCOUNTER — OFFICE VISIT (OUTPATIENT)
Dept: CARDIOLOGY CLINIC | Age: 69
End: 2024-04-10
Payer: MEDICARE

## 2024-04-10 VITALS
SYSTOLIC BLOOD PRESSURE: 124 MMHG | WEIGHT: 206.2 LBS | HEART RATE: 72 BPM | BODY MASS INDEX: 29.52 KG/M2 | DIASTOLIC BLOOD PRESSURE: 78 MMHG | HEIGHT: 70 IN

## 2024-04-10 DIAGNOSIS — R06.02 SOB (SHORTNESS OF BREATH) ON EXERTION: ICD-10-CM

## 2024-04-10 DIAGNOSIS — Z98.890 S/P CARDIAC CATH: ICD-10-CM

## 2024-04-10 DIAGNOSIS — R42 DIZZINESS: ICD-10-CM

## 2024-04-10 DIAGNOSIS — R94.31 ABNORMAL EKG: ICD-10-CM

## 2024-04-10 DIAGNOSIS — Z91.148 NONCOMPLIANCE WITH MEDICATION REGIMEN: ICD-10-CM

## 2024-04-10 DIAGNOSIS — E78.5 DYSLIPIDEMIA: ICD-10-CM

## 2024-04-10 DIAGNOSIS — Z01.818 PRE-OP EVALUATION: Primary | ICD-10-CM

## 2024-04-10 PROBLEM — R07.9 CHEST PAIN IN ADULT: Status: RESOLVED | Noted: 2020-07-20 | Resolved: 2024-04-10

## 2024-04-10 PROBLEM — R55 NEAR SYNCOPE: Status: RESOLVED | Noted: 2017-09-06 | Resolved: 2024-04-10

## 2024-04-10 PROBLEM — R00.1 SINUS BRADYCARDIA: Status: RESOLVED | Noted: 2017-09-06 | Resolved: 2024-04-10

## 2024-04-10 PROCEDURE — 4004F PT TOBACCO SCREEN RCVD TLK: CPT | Performed by: INTERNAL MEDICINE

## 2024-04-10 PROCEDURE — G8427 DOCREV CUR MEDS BY ELIG CLIN: HCPCS | Performed by: INTERNAL MEDICINE

## 2024-04-10 PROCEDURE — 3017F COLORECTAL CA SCREEN DOC REV: CPT | Performed by: INTERNAL MEDICINE

## 2024-04-10 PROCEDURE — 99214 OFFICE O/P EST MOD 30 MIN: CPT | Performed by: INTERNAL MEDICINE

## 2024-04-10 PROCEDURE — 1123F ACP DISCUSS/DSCN MKR DOCD: CPT | Performed by: INTERNAL MEDICINE

## 2024-04-10 PROCEDURE — 93000 ELECTROCARDIOGRAM COMPLETE: CPT | Performed by: INTERNAL MEDICINE

## 2024-04-10 PROCEDURE — G8419 CALC BMI OUT NRM PARAM NOF/U: HCPCS | Performed by: INTERNAL MEDICINE

## 2024-04-10 RX ORDER — TIZANIDINE 4 MG/1
4 TABLET ORAL 3 TIMES DAILY PRN
COMMUNITY
Start: 2024-01-15

## 2024-04-16 ENCOUNTER — HOSPITAL ENCOUNTER (OUTPATIENT)
Age: 69
Discharge: HOME | End: 2024-04-18
Attending: INTERNAL MEDICINE
Payer: MEDICARE

## 2024-04-16 VITALS
SYSTOLIC BLOOD PRESSURE: 124 MMHG | WEIGHT: 206 LBS | DIASTOLIC BLOOD PRESSURE: 75 MMHG | HEIGHT: 70 IN | BODY MASS INDEX: 29.49 KG/M2

## 2024-04-16 DIAGNOSIS — E78.5 DYSLIPIDEMIA: ICD-10-CM

## 2024-04-16 DIAGNOSIS — R06.02 SOB (SHORTNESS OF BREATH) ON EXERTION: ICD-10-CM

## 2024-04-16 DIAGNOSIS — R42 DIZZINESS: ICD-10-CM

## 2024-04-16 DIAGNOSIS — R94.31 ABNORMAL EKG: ICD-10-CM

## 2024-04-16 DIAGNOSIS — Z01.818 PRE-OP EVALUATION: ICD-10-CM

## 2024-04-16 DIAGNOSIS — Z98.890 S/P CARDIAC CATH: ICD-10-CM

## 2024-04-16 LAB
ECHO AO ASC DIAM: 3.6 CM
ECHO AO ASCENDING AORTA INDEX: 1.71 CM/M2
ECHO AO SINUS VALSALVA DIAM: 3.3 CM
ECHO AO SINUS VALSALVA INDEX: 1.56 CM/M2
ECHO AO ST JNCT DIAM: 3.1 CM
ECHO AV CUSP MM: 2.1 CM
ECHO AV PEAK GRADIENT: 8 MMHG
ECHO AV PEAK VELOCITY: 1.4 M/S
ECHO AV VELOCITY RATIO: 0.64
ECHO BSA: 2.15 M2
ECHO EST RA PRESSURE: 5 MMHG
ECHO LA AREA 2C: 15.5 CM2
ECHO LA AREA 4C: 13.2 CM2
ECHO LA DIAMETER INDEX: 1.8 CM/M2
ECHO LA DIAMETER: 3.8 CM
ECHO LA MAJOR AXIS: 4.7 CM
ECHO LA MINOR AXIS: 4.8 CM
ECHO LA VOL BP: 35 ML (ref 18–58)
ECHO LA VOL MOD A2C: 41 ML (ref 18–58)
ECHO LA VOL MOD A4C: 29 ML (ref 18–58)
ECHO LA VOL/BSA BIPLANE: 17 ML/M2 (ref 16–34)
ECHO LA VOLUME INDEX MOD A2C: 19 ML/M2 (ref 16–34)
ECHO LA VOLUME INDEX MOD A4C: 14 ML/M2 (ref 16–34)
ECHO LV E' LATERAL VELOCITY: 10 CM/S
ECHO LV E' SEPTAL VELOCITY: 9 CM/S
ECHO LV FRACTIONAL SHORTENING: 29 % (ref 28–44)
ECHO LV INTERNAL DIMENSION DIASTOLE INDEX: 2.27 CM/M2
ECHO LV INTERNAL DIMENSION DIASTOLIC: 4.8 CM (ref 4.2–5.9)
ECHO LV INTERNAL DIMENSION SYSTOLIC INDEX: 1.61 CM/M2
ECHO LV INTERNAL DIMENSION SYSTOLIC: 3.4 CM
ECHO LV ISOVOLUMETRIC RELAXATION TIME (IVRT): 70 MS
ECHO LV IVSD: 1.1 CM (ref 0.6–1)
ECHO LV MASS 2D: 206.4 G (ref 88–224)
ECHO LV MASS INDEX 2D: 97.8 G/M2 (ref 49–115)
ECHO LV POSTERIOR WALL DIASTOLIC: 1.2 CM (ref 0.6–1)
ECHO LV RELATIVE WALL THICKNESS RATIO: 0.5
ECHO LVOT PEAK GRADIENT: 4 MMHG
ECHO LVOT PEAK VELOCITY: 0.9 M/S
ECHO MV A VELOCITY: 0.91 M/S
ECHO MV E DECELERATION TIME (DT): 251 MS
ECHO MV E VELOCITY: 1.29 M/S
ECHO MV E/A RATIO: 1.42
ECHO MV E/E' LATERAL: 12.9
ECHO MV E/E' RATIO (AVERAGED): 13.62
ECHO MV REGURGITANT PEAK GRADIENT: 96 MMHG
ECHO MV REGURGITANT PEAK VELOCITY: 4.9 M/S
ECHO PULMONARY ARTERY END DIASTOLIC PRESSURE: 4 MMHG
ECHO PV MAX VELOCITY: 0.8 M/S
ECHO PV PEAK GRADIENT: 2 MMHG
ECHO PV REGURGITANT MAX VELOCITY: 1 M/S
ECHO RIGHT VENTRICULAR SYSTOLIC PRESSURE (RVSP): 37 MMHG
ECHO RV INTERNAL DIMENSION: 3 CM
ECHO RV TAPSE: 3.2 CM (ref 1.7–?)
ECHO TV E WAVE: 0.7 M/S
ECHO TV REGURGITANT MAX VELOCITY: 2.81 M/S
ECHO TV REGURGITANT PEAK GRADIENT: 32 MMHG

## 2024-04-16 PROCEDURE — 93306 TTE W/DOPPLER COMPLETE: CPT

## 2024-04-16 PROCEDURE — 93306 TTE W/DOPPLER COMPLETE: CPT | Performed by: INTERNAL MEDICINE

## 2024-05-10 PROBLEM — Z01.818 PRE-OP EVALUATION: Status: RESOLVED | Noted: 2024-04-10 | Resolved: 2024-05-10

## 2025-07-08 ENCOUNTER — HOSPITAL ENCOUNTER (EMERGENCY)
Age: 70
Discharge: HOME | End: 2025-07-08
Attending: STUDENT IN AN ORGANIZED HEALTH CARE EDUCATION/TRAINING PROGRAM
Payer: MEDICARE

## 2025-07-08 ENCOUNTER — APPOINTMENT (OUTPATIENT)
Dept: GENERAL RADIOLOGY | Age: 70
End: 2025-07-08
Payer: MEDICARE

## 2025-07-08 VITALS
DIASTOLIC BLOOD PRESSURE: 81 MMHG | HEART RATE: 63 BPM | WEIGHT: 210 LBS | HEIGHT: 70 IN | OXYGEN SATURATION: 98 % | TEMPERATURE: 98.5 F | SYSTOLIC BLOOD PRESSURE: 146 MMHG | BODY MASS INDEX: 30.06 KG/M2 | RESPIRATION RATE: 20 BRPM

## 2025-07-08 DIAGNOSIS — L03.113 CELLULITIS OF RIGHT UPPER EXTREMITY: Primary | ICD-10-CM

## 2025-07-08 LAB
ANION GAP SERPL CALCULATED.3IONS-SCNC: 9 MMOL/L (ref 9–16)
BASOPHILS # BLD: 0.1 K/UL (ref 0–0.2)
BASOPHILS NFR BLD: 1 % (ref 0–2)
BUN SERPL-MCNC: 20 MG/DL (ref 8–23)
CALCIUM SERPL-MCNC: 9 MG/DL (ref 8.6–10.4)
CHLORIDE SERPL-SCNC: 104 MMOL/L (ref 98–107)
CO2 SERPL-SCNC: 25 MMOL/L (ref 20–31)
CREAT SERPL-MCNC: 0.9 MG/DL (ref 0.7–1.2)
CRP SERPL HS-MCNC: 28.4 MG/L (ref 0–5)
EOSINOPHIL # BLD: 0.3 K/UL (ref 0–0.4)
EOSINOPHILS RELATIVE PERCENT: 3 % (ref 1–4)
ERYTHROCYTE [DISTWIDTH] IN BLOOD BY AUTOMATED COUNT: 15 % (ref 12.5–15.4)
ERYTHROCYTE [SEDIMENTATION RATE] IN BLOOD BY PHOTOMETRIC METHOD: 21 MM/HR (ref 0–20)
GFR, ESTIMATED: >90 ML/MIN/1.73M2
GLUCOSE SERPL-MCNC: 103 MG/DL (ref 74–99)
HCT VFR BLD AUTO: 45.4 % (ref 41–53)
HGB BLD-MCNC: 14.9 G/DL (ref 13.5–17.5)
LYMPHOCYTES NFR BLD: 2.3 K/UL (ref 1–4.8)
LYMPHOCYTES RELATIVE PERCENT: 19 % (ref 24–44)
MCH RBC QN AUTO: 28.9 PG (ref 26–34)
MCHC RBC AUTO-ENTMCNC: 32.8 G/DL (ref 31–37)
MCV RBC AUTO: 87.9 FL (ref 80–100)
MONOCYTES NFR BLD: 1.3 K/UL (ref 0.1–1.2)
MONOCYTES NFR BLD: 10 % (ref 2–11)
NEUTROPHILS NFR BLD: 67 % (ref 36–66)
NEUTS SEG NFR BLD: 8.2 K/UL (ref 1.8–7.7)
PLATELET # BLD AUTO: 225 K/UL (ref 140–450)
PMV BLD AUTO: 8.9 FL (ref 6–12)
POTASSIUM SERPL-SCNC: 4.5 MMOL/L (ref 3.7–5.3)
RBC # BLD AUTO: 5.16 M/UL (ref 4.5–5.9)
SODIUM SERPL-SCNC: 138 MMOL/L (ref 136–145)
WBC OTHER # BLD: 12.2 K/UL (ref 3.5–11)

## 2025-07-08 PROCEDURE — 86140 C-REACTIVE PROTEIN: CPT

## 2025-07-08 PROCEDURE — 99284 EMERGENCY DEPT VISIT MOD MDM: CPT

## 2025-07-08 PROCEDURE — 2500000003 HC RX 250 WO HCPCS: Performed by: STUDENT IN AN ORGANIZED HEALTH CARE EDUCATION/TRAINING PROGRAM

## 2025-07-08 PROCEDURE — 85652 RBC SED RATE AUTOMATED: CPT

## 2025-07-08 PROCEDURE — 85025 COMPLETE CBC W/AUTO DIFF WBC: CPT

## 2025-07-08 PROCEDURE — 73080 X-RAY EXAM OF ELBOW: CPT

## 2025-07-08 PROCEDURE — 96374 THER/PROPH/DIAG INJ IV PUSH: CPT

## 2025-07-08 PROCEDURE — 80048 BASIC METABOLIC PNL TOTAL CA: CPT

## 2025-07-08 PROCEDURE — 6360000002 HC RX W HCPCS: Performed by: STUDENT IN AN ORGANIZED HEALTH CARE EDUCATION/TRAINING PROGRAM

## 2025-07-08 PROCEDURE — 96375 TX/PRO/DX INJ NEW DRUG ADDON: CPT

## 2025-07-08 RX ORDER — KETOROLAC TROMETHAMINE 15 MG/ML
15 INJECTION, SOLUTION INTRAMUSCULAR; INTRAVENOUS ONCE
Status: COMPLETED | OUTPATIENT
Start: 2025-07-08 | End: 2025-07-08

## 2025-07-08 RX ORDER — CEPHALEXIN 500 MG/1
500 CAPSULE ORAL 4 TIMES DAILY
Qty: 28 CAPSULE | Refills: 0 | Status: ON HOLD | OUTPATIENT
Start: 2025-07-08 | End: 2025-07-11 | Stop reason: HOSPADM

## 2025-07-08 RX ADMIN — WATER 1000 MG: 1 INJECTION INTRAMUSCULAR; INTRAVENOUS; SUBCUTANEOUS at 09:43

## 2025-07-08 RX ADMIN — KETOROLAC TROMETHAMINE 15 MG: 15 INJECTION, SOLUTION INTRAMUSCULAR; INTRAVENOUS at 08:23

## 2025-07-08 ASSESSMENT — PAIN DESCRIPTION - ORIENTATION
ORIENTATION: LEFT
ORIENTATION: RIGHT
ORIENTATION: RIGHT

## 2025-07-08 ASSESSMENT — PAIN DESCRIPTION - LOCATION
LOCATION: ELBOW

## 2025-07-08 ASSESSMENT — PAIN SCALES - GENERAL
PAINLEVEL_OUTOF10: 10
PAINLEVEL_OUTOF10: 9
PAINLEVEL_OUTOF10: 10

## 2025-07-08 ASSESSMENT — LIFESTYLE VARIABLES
HOW OFTEN DO YOU HAVE A DRINK CONTAINING ALCOHOL: NEVER
HOW MANY STANDARD DRINKS CONTAINING ALCOHOL DO YOU HAVE ON A TYPICAL DAY: PATIENT DOES NOT DRINK

## 2025-07-08 ASSESSMENT — PAIN - FUNCTIONAL ASSESSMENT
PAIN_FUNCTIONAL_ASSESSMENT: 0-10
PAIN_FUNCTIONAL_ASSESSMENT: 0-10

## 2025-07-08 NOTE — ED PROVIDER NOTES
[facility] Emergency Department  [address]  [place] [address]  Phone: [phone]  Fax: [phone]      Patient Name:  Woodrow Diez  Medical Record Number:  [id number]  YOB: 1955  Date of Service:  7/8/2025  Primary Care Physician:  No primary care provider on file.      CHIEF COMPLAINT:       Chief Complaint   Patient presents with    Joint Swelling     Woke yesterday with swelling and redness to rt elbow, worse this morning, denies any injury       HISTORY OF PRESENT ILLNESS:    Woodrow Diez is a 69 y.o. male who presents with the complaint of swelling, redness and pain to the right elbow.  He denies any injury or falls.  States it started yesterday, worsened today.  He feels like his arm is swollen down to the mid forearm now.  Denies any history of similar symptoms, denies any fevers, chills, numbness or weakness.  Has limited range of motion due to pain, has not taken anything for pain.    CURRENT MEDICATIONS:      Previous Medications    ALBUTEROL SULFATE HFA (PROVENTIL;VENTOLIN;PROAIR) 108 (90 BASE) MCG/ACT INHALER    Inhale 2 puffs into the lungs every 4 hours as needed for Wheezing or Shortness of Breath    ATORVASTATIN (LIPITOR) 40 MG TABLET    Take 1 tablet by mouth daily    GABAPENTIN (NEURONTIN) 600 MG TABLET    TAKE 2 TABLETS BY MOUTH 3 TIMES DAILY FOR 30 DAYS.    TIZANIDINE (ZANAFLEX) 4 MG TABLET    Take 1 tablet by mouth 3 times daily as needed       ALLERGIES:   is allergic to norco [hydrocodone-acetaminophen].    PAST MEDICAL HISTORY:    has a past medical history of Anxiety, Arthritis, Bradycardia, Cervical spondylosis with myelopathy, COPD (chronic obstructive pulmonary disease) ([facility]), Depression, GERD (gastroesophageal reflux disease), Headache(784.0), Hyperlipidemia, Hypertension, Low back pain, Prolonged emergence from general anesthesia, Snoring, and Tobacco abuse.    SURGICAL HISTORY:      has a past surgical history that includes Colonoscopy

## 2025-07-09 ENCOUNTER — HOSPITAL ENCOUNTER (INPATIENT)
Age: 70
LOS: 2 days | Discharge: HOME | End: 2025-07-11
Attending: EMERGENCY MEDICINE | Admitting: STUDENT IN AN ORGANIZED HEALTH CARE EDUCATION/TRAINING PROGRAM
Payer: MEDICARE

## 2025-07-09 DIAGNOSIS — L03.113 CELLULITIS OF RIGHT UPPER EXTREMITY: Primary | ICD-10-CM

## 2025-07-09 LAB
ALBUMIN SERPL BCG-MCNC: 3.9 G/DL (ref 3.4–4.9)
ALP SERPL-CCNC: 106 U/L (ref 40–129)
ALT SERPL W/O P-5'-P-CCNC: 14 U/L (ref 10–50)
ANION GAP SERPL CALC-SCNC: 14 MEQ/L (ref 8–16)
AST SERPL-CCNC: 19 U/L (ref 10–50)
BASOPHILS ABSOLUTE: 0.1 THOU/MM3 (ref 0–0.1)
BASOPHILS NFR BLD AUTO: 0.8 %
BILIRUB SERPL-MCNC: 0.3 MG/DL (ref 0.3–1.2)
BUN SERPL-MCNC: 28 MG/DL (ref 8–23)
CALCIUM SERPL-MCNC: 9.2 MG/DL (ref 8.8–10.2)
CHLORIDE SERPL-SCNC: 103 MEQ/L (ref 98–111)
CO2 SERPL-SCNC: 23 MEQ/L (ref 22–29)
CREAT SERPL-MCNC: 0.9 MG/DL (ref 0.7–1.2)
CRP SERPL-MCNC: 3.5 MG/DL (ref 0–0.5)
DEPRECATED RDW RBC AUTO: 46.7 FL (ref 35–45)
EOSINOPHIL NFR BLD AUTO: 2.8 %
EOSINOPHILS ABSOLUTE: 0.4 THOU/MM3 (ref 0–0.4)
ERYTHROCYTE [DISTWIDTH] IN BLOOD BY AUTOMATED COUNT: 14.7 % (ref 11.5–14.5)
ERYTHROCYTE [SEDIMENTATION RATE] IN BLOOD BY WESTERGREN METHOD: 21 MM/HR (ref 0–20)
GFR SERPL CREATININE-BSD FRML MDRD: > 90 ML/MIN/1.73M2
GLUCOSE SERPL-MCNC: 100 MG/DL (ref 74–109)
HCT VFR BLD AUTO: 43.1 % (ref 42–52)
HGB BLD-MCNC: 14.7 GM/DL (ref 14–18)
IMM GRANULOCYTES # BLD AUTO: 0.06 THOU/MM3 (ref 0–0.07)
IMM GRANULOCYTES NFR BLD AUTO: 0.5 %
LACTIC ACID, SEPSIS: 1 MMOL/L (ref 0.5–1.9)
LYMPHOCYTES ABSOLUTE: 2.9 THOU/MM3 (ref 1–4.8)
LYMPHOCYTES NFR BLD AUTO: 23 %
MCH RBC QN AUTO: 29.8 PG (ref 26–33)
MCHC RBC AUTO-ENTMCNC: 34.1 GM/DL (ref 32.2–35.5)
MCV RBC AUTO: 87.4 FL (ref 80–94)
MONOCYTES ABSOLUTE: 1.2 THOU/MM3 (ref 0.4–1.3)
MONOCYTES NFR BLD AUTO: 9.8 %
NEUTROPHILS ABSOLUTE: 8 THOU/MM3 (ref 1.8–7.7)
NEUTROPHILS NFR BLD AUTO: 63.1 %
NRBC BLD AUTO-RTO: 0 /100 WBC
OSMOLALITY SERPL CALC.SUM OF ELEC: 285 MOSMOL/KG (ref 275–300)
PLATELET # BLD AUTO: 237 THOU/MM3 (ref 130–400)
PMV BLD AUTO: 10.9 FL (ref 9.4–12.4)
POTASSIUM SERPL-SCNC: 4.1 MEQ/L (ref 3.5–5.2)
PROCALCITONIN SERPL IA-MCNC: 0.04 NG/ML (ref 0.01–0.09)
PROT SERPL-MCNC: 7.1 G/DL (ref 6.4–8.3)
RBC # BLD AUTO: 4.93 MILL/MM3 (ref 4.7–6.1)
SODIUM SERPL-SCNC: 140 MEQ/L (ref 135–145)
WBC # BLD AUTO: 12.7 THOU/MM3 (ref 4.8–10.8)

## 2025-07-09 PROCEDURE — 85025 COMPLETE CBC W/AUTO DIFF WBC: CPT

## 2025-07-09 PROCEDURE — 96375 TX/PRO/DX INJ NEW DRUG ADDON: CPT

## 2025-07-09 PROCEDURE — 96374 THER/PROPH/DIAG INJ IV PUSH: CPT

## 2025-07-09 PROCEDURE — 87040 BLOOD CULTURE FOR BACTERIA: CPT

## 2025-07-09 PROCEDURE — 85651 RBC SED RATE NONAUTOMATED: CPT

## 2025-07-09 PROCEDURE — 99223 1ST HOSP IP/OBS HIGH 75: CPT

## 2025-07-09 PROCEDURE — 6360000002 HC RX W HCPCS

## 2025-07-09 PROCEDURE — 99285 EMERGENCY DEPT VISIT HI MDM: CPT

## 2025-07-09 PROCEDURE — 2500000003 HC RX 250 WO HCPCS

## 2025-07-09 PROCEDURE — 83605 ASSAY OF LACTIC ACID: CPT

## 2025-07-09 PROCEDURE — 84145 PROCALCITONIN (PCT): CPT

## 2025-07-09 PROCEDURE — 36415 COLL VENOUS BLD VENIPUNCTURE: CPT

## 2025-07-09 PROCEDURE — 86140 C-REACTIVE PROTEIN: CPT

## 2025-07-09 PROCEDURE — 1200000003 HC TELEMETRY R&B

## 2025-07-09 PROCEDURE — 80053 COMPREHEN METABOLIC PANEL: CPT

## 2025-07-09 RX ORDER — ACETAMINOPHEN 650 MG/1
650 SUPPOSITORY RECTAL EVERY 6 HOURS PRN
Status: DISCONTINUED | OUTPATIENT
Start: 2025-07-09 | End: 2025-07-11 | Stop reason: HOSPADM

## 2025-07-09 RX ORDER — POTASSIUM CHLORIDE 1500 MG/1
40 TABLET, EXTENDED RELEASE ORAL PRN
Status: DISCONTINUED | OUTPATIENT
Start: 2025-07-09 | End: 2025-07-11 | Stop reason: HOSPADM

## 2025-07-09 RX ORDER — MORPHINE SULFATE 2 MG/ML
1 INJECTION, SOLUTION INTRAMUSCULAR; INTRAVENOUS
Status: DISCONTINUED | OUTPATIENT
Start: 2025-07-09 | End: 2025-07-11 | Stop reason: HOSPADM

## 2025-07-09 RX ORDER — ONDANSETRON 2 MG/ML
4 INJECTION INTRAMUSCULAR; INTRAVENOUS EVERY 6 HOURS PRN
Status: DISCONTINUED | OUTPATIENT
Start: 2025-07-09 | End: 2025-07-11 | Stop reason: HOSPADM

## 2025-07-09 RX ORDER — ONDANSETRON 4 MG/1
4 TABLET, ORALLY DISINTEGRATING ORAL EVERY 8 HOURS PRN
Status: DISCONTINUED | OUTPATIENT
Start: 2025-07-09 | End: 2025-07-11 | Stop reason: HOSPADM

## 2025-07-09 RX ORDER — MORPHINE SULFATE 2 MG/ML
2 INJECTION, SOLUTION INTRAMUSCULAR; INTRAVENOUS
Status: DISCONTINUED | OUTPATIENT
Start: 2025-07-09 | End: 2025-07-11 | Stop reason: HOSPADM

## 2025-07-09 RX ORDER — SODIUM CHLORIDE 9 MG/ML
INJECTION, SOLUTION INTRAVENOUS PRN
Status: DISCONTINUED | OUTPATIENT
Start: 2025-07-09 | End: 2025-07-11 | Stop reason: HOSPADM

## 2025-07-09 RX ORDER — MAGNESIUM SULFATE IN WATER 40 MG/ML
2000 INJECTION, SOLUTION INTRAVENOUS PRN
Status: DISCONTINUED | OUTPATIENT
Start: 2025-07-09 | End: 2025-07-11 | Stop reason: HOSPADM

## 2025-07-09 RX ORDER — OXYCODONE HYDROCHLORIDE 5 MG/1
5 TABLET ORAL EVERY 4 HOURS PRN
Refills: 0 | Status: DISCONTINUED | OUTPATIENT
Start: 2025-07-09 | End: 2025-07-10

## 2025-07-09 RX ORDER — KETOROLAC TROMETHAMINE 30 MG/ML
15 INJECTION, SOLUTION INTRAMUSCULAR; INTRAVENOUS EVERY 6 HOURS PRN
Status: DISCONTINUED | OUTPATIENT
Start: 2025-07-09 | End: 2025-07-11 | Stop reason: HOSPADM

## 2025-07-09 RX ORDER — ACETAMINOPHEN 325 MG/1
650 TABLET ORAL EVERY 6 HOURS PRN
Status: DISCONTINUED | OUTPATIENT
Start: 2025-07-09 | End: 2025-07-11 | Stop reason: HOSPADM

## 2025-07-09 RX ORDER — POTASSIUM CHLORIDE 7.45 MG/ML
10 INJECTION INTRAVENOUS PRN
Status: DISCONTINUED | OUTPATIENT
Start: 2025-07-09 | End: 2025-07-11 | Stop reason: HOSPADM

## 2025-07-09 RX ORDER — MORPHINE SULFATE 4 MG/ML
4 INJECTION, SOLUTION INTRAMUSCULAR; INTRAVENOUS ONCE
Refills: 0 | Status: COMPLETED | OUTPATIENT
Start: 2025-07-09 | End: 2025-07-09

## 2025-07-09 RX ORDER — SODIUM CHLORIDE 0.9 % (FLUSH) 0.9 %
5-40 SYRINGE (ML) INJECTION PRN
Status: DISCONTINUED | OUTPATIENT
Start: 2025-07-09 | End: 2025-07-11 | Stop reason: HOSPADM

## 2025-07-09 RX ORDER — ENOXAPARIN SODIUM 100 MG/ML
40 INJECTION SUBCUTANEOUS DAILY
Status: DISCONTINUED | OUTPATIENT
Start: 2025-07-10 | End: 2025-07-11 | Stop reason: HOSPADM

## 2025-07-09 RX ORDER — POLYETHYLENE GLYCOL 3350 17 G/17G
17 POWDER, FOR SOLUTION ORAL DAILY PRN
Status: DISCONTINUED | OUTPATIENT
Start: 2025-07-09 | End: 2025-07-11 | Stop reason: HOSPADM

## 2025-07-09 RX ORDER — SODIUM CHLORIDE 9 MG/ML
INJECTION, SOLUTION INTRAVENOUS CONTINUOUS
Status: ACTIVE | OUTPATIENT
Start: 2025-07-10 | End: 2025-07-11

## 2025-07-09 RX ORDER — SODIUM CHLORIDE 0.9 % (FLUSH) 0.9 %
5-40 SYRINGE (ML) INJECTION EVERY 12 HOURS SCHEDULED
Status: DISCONTINUED | OUTPATIENT
Start: 2025-07-10 | End: 2025-07-11 | Stop reason: HOSPADM

## 2025-07-09 RX ADMIN — Medication 2500 MG: at 23:02

## 2025-07-09 RX ADMIN — WATER 2000 MG: 1 INJECTION INTRAMUSCULAR; INTRAVENOUS; SUBCUTANEOUS at 22:01

## 2025-07-09 RX ADMIN — MORPHINE SULFATE 4 MG: 4 INJECTION, SOLUTION INTRAMUSCULAR; INTRAVENOUS at 23:12

## 2025-07-09 ASSESSMENT — PAIN - FUNCTIONAL ASSESSMENT: PAIN_FUNCTIONAL_ASSESSMENT: 0-10

## 2025-07-09 ASSESSMENT — PAIN SCALES - GENERAL: PAINLEVEL_OUTOF10: 9

## 2025-07-10 PROBLEM — J44.9 CHRONIC OBSTRUCTIVE PULMONARY DISEASE (HCC): Status: ACTIVE | Noted: 2025-07-10

## 2025-07-10 PROBLEM — I10 PRIMARY HYPERTENSION: Status: ACTIVE | Noted: 2025-07-10

## 2025-07-10 PROBLEM — F17.200 TOBACCO USE DISORDER: Status: ACTIVE | Noted: 2025-07-10

## 2025-07-10 LAB
ANION GAP SERPL CALC-SCNC: 14 MEQ/L (ref 8–16)
BACTERIA: NORMAL
BASOPHILS ABSOLUTE: 0 THOU/MM3 (ref 0–0.1)
BASOPHILS NFR BLD AUTO: 0.3 %
BILIRUB UR QL STRIP: NEGATIVE
BUN SERPL-MCNC: 21 MG/DL (ref 8–23)
CALCIUM SERPL-MCNC: 9.8 MG/DL (ref 8.8–10.2)
CASTS #/AREA URNS LPF: NORMAL /LPF
CASTS #/AREA URNS LPF: NORMAL /LPF
CHARACTER UR: CLEAR
CHARCOAL URNS QL MICRO: NORMAL
CHLORIDE 24H UR-SRATE: 125 MEQ/L
CHLORIDE SERPL-SCNC: 102 MEQ/L (ref 98–111)
CO2 SERPL-SCNC: 23 MEQ/L (ref 22–29)
COLOR UR: YELLOW
CREAT SERPL-MCNC: 1.4 MG/DL (ref 0.7–1.2)
CREAT UR-MCNC: 36.3 MG/DL
CRYSTALS URNS QL MICRO: NORMAL
DEPRECATED RDW RBC AUTO: 44 FL (ref 35–45)
EOSINOPHIL NFR BLD AUTO: 1.8 %
EOSINOPHIL SMEAR, URINE: NORMAL
EOSINOPHILS ABSOLUTE: 0.2 THOU/MM3 (ref 0–0.4)
EPITHELIAL CELLS, UA: NORMAL /HPF
ERYTHROCYTE [DISTWIDTH] IN BLOOD BY AUTOMATED COUNT: 13.3 % (ref 11.5–14.5)
GFR SERPL CREATININE-BSD FRML MDRD: 55 ML/MIN/1.73M2
GLUCOSE SERPL-MCNC: 141 MG/DL (ref 74–109)
GLUCOSE UR QL STRIP.AUTO: NEGATIVE MG/DL
HCT VFR BLD AUTO: 45.8 % (ref 42–52)
HGB BLD-MCNC: 16 GM/DL (ref 14–18)
HGB UR QL STRIP.AUTO: NEGATIVE
IMM GRANULOCYTES # BLD AUTO: 0.03 THOU/MM3 (ref 0–0.07)
IMM GRANULOCYTES NFR BLD AUTO: 0.3 %
KETONES UR QL STRIP.AUTO: NEGATIVE
LEUKOCYTE ESTERASE UR QL STRIP.AUTO: NEGATIVE
LYMPHOCYTES ABSOLUTE: 1.4 THOU/MM3 (ref 1–4.8)
LYMPHOCYTES NFR BLD AUTO: 15 %
MCH RBC QN AUTO: 31.9 PG (ref 26–33)
MCHC RBC AUTO-ENTMCNC: 34.9 GM/DL (ref 32.2–35.5)
MCV RBC AUTO: 91.4 FL (ref 80–94)
MONOCYTES ABSOLUTE: 0.7 THOU/MM3 (ref 0.4–1.3)
MONOCYTES NFR BLD AUTO: 7.2 %
NEUTROPHILS ABSOLUTE: 6.9 THOU/MM3 (ref 1.8–7.7)
NEUTROPHILS NFR BLD AUTO: 75.4 %
NITRITE UR QL STRIP.AUTO: NEGATIVE
NRBC BLD AUTO-RTO: 0 /100 WBC
OSMOLALITY SERPL CALC.SUM OF ELEC: 282.9 MOSMOL/KG (ref 275–300)
PH UR STRIP.AUTO: 5.5 [PH] (ref 5–9)
PLATELET # BLD AUTO: 185 THOU/MM3 (ref 130–400)
PMV BLD AUTO: 9.7 FL (ref 9.4–12.4)
POTASSIUM SERPL-SCNC: 4.3 MEQ/L (ref 3.5–5.2)
POTASSIUM UR-SCNC: 25.3 MEQ/L
PROT UR STRIP.AUTO-MCNC: NEGATIVE MG/DL
RBC # BLD AUTO: 5.01 MILL/MM3 (ref 4.7–6.1)
RBC #/AREA URNS HPF: NORMAL /HPF
RENAL EPI CELLS #/AREA URNS HPF: NORMAL /[HPF]
SODIUM SERPL-SCNC: 139 MEQ/L (ref 135–145)
SODIUM UR-SCNC: 129 MEQ/L
SPECIFIC GRAVITY UA: 1.02 (ref 1–1.03)
URATE SERPL-MCNC: 5.3 MG/DL (ref 3.7–7)
UROBILINOGEN, URINE: 0.2 EU/DL (ref 0–1)
UUN 24H UR-MCNC: 355 MG/DL
WBC # BLD AUTO: 9.1 THOU/MM3 (ref 4.8–10.8)
WBC #/AREA URNS HPF: NORMAL /HPF
YEAST LIKE FUNGI URNS QL MICRO: NORMAL

## 2025-07-10 PROCEDURE — 36415 COLL VENOUS BLD VENIPUNCTURE: CPT

## 2025-07-10 PROCEDURE — 1200000003 HC TELEMETRY R&B

## 2025-07-10 PROCEDURE — 84133 ASSAY OF URINE POTASSIUM: CPT

## 2025-07-10 PROCEDURE — 6360000002 HC RX W HCPCS

## 2025-07-10 PROCEDURE — 84540 ASSAY OF URINE/UREA-N: CPT

## 2025-07-10 PROCEDURE — 2500000003 HC RX 250 WO HCPCS

## 2025-07-10 PROCEDURE — 84550 ASSAY OF BLOOD/URIC ACID: CPT

## 2025-07-10 PROCEDURE — 82436 ASSAY OF URINE CHLORIDE: CPT

## 2025-07-10 PROCEDURE — 99223 1ST HOSP IP/OBS HIGH 75: CPT | Performed by: STUDENT IN AN ORGANIZED HEALTH CARE EDUCATION/TRAINING PROGRAM

## 2025-07-10 PROCEDURE — 99233 SBSQ HOSP IP/OBS HIGH 50: CPT | Performed by: STUDENT IN AN ORGANIZED HEALTH CARE EDUCATION/TRAINING PROGRAM

## 2025-07-10 PROCEDURE — 81001 URINALYSIS AUTO W/SCOPE: CPT

## 2025-07-10 PROCEDURE — 85025 COMPLETE CBC W/AUTO DIFF WBC: CPT

## 2025-07-10 PROCEDURE — 82570 ASSAY OF URINE CREATININE: CPT

## 2025-07-10 PROCEDURE — 80048 BASIC METABOLIC PNL TOTAL CA: CPT

## 2025-07-10 PROCEDURE — 2580000003 HC RX 258

## 2025-07-10 PROCEDURE — 84300 ASSAY OF URINE SODIUM: CPT

## 2025-07-10 PROCEDURE — 87081 CULTURE SCREEN ONLY: CPT

## 2025-07-10 PROCEDURE — 6370000000 HC RX 637 (ALT 250 FOR IP)

## 2025-07-10 PROCEDURE — 89190 NASAL SMEAR FOR EOSINOPHILS: CPT

## 2025-07-10 RX ORDER — ALBUTEROL SULFATE 90 UG/1
2 INHALANT RESPIRATORY (INHALATION) EVERY 4 HOURS PRN
Status: DISCONTINUED | OUTPATIENT
Start: 2025-07-10 | End: 2025-07-11 | Stop reason: HOSPADM

## 2025-07-10 RX ORDER — GABAPENTIN 800 MG/1
800 TABLET ORAL 4 TIMES DAILY
COMMUNITY
Start: 2025-06-11

## 2025-07-10 RX ORDER — ATORVASTATIN CALCIUM 40 MG/1
40 TABLET, FILM COATED ORAL DAILY
Status: DISCONTINUED | OUTPATIENT
Start: 2025-07-10 | End: 2025-07-11 | Stop reason: HOSPADM

## 2025-07-10 RX ORDER — SUMATRIPTAN 50 MG/1
50 TABLET, FILM COATED ORAL ONCE
Status: COMPLETED | OUTPATIENT
Start: 2025-07-10 | End: 2025-07-10

## 2025-07-10 RX ORDER — OXYCODONE AND ACETAMINOPHEN 10; 325 MG/1; MG/1
1 TABLET ORAL EVERY 6 HOURS
Refills: 0 | Status: DISCONTINUED | OUTPATIENT
Start: 2025-07-10 | End: 2025-07-11 | Stop reason: HOSPADM

## 2025-07-10 RX ORDER — GABAPENTIN 300 MG/1
600 CAPSULE ORAL 3 TIMES DAILY
Status: DISCONTINUED | OUTPATIENT
Start: 2025-07-10 | End: 2025-07-11 | Stop reason: HOSPADM

## 2025-07-10 RX ORDER — OXYCODONE AND ACETAMINOPHEN 10; 325 MG/1; MG/1
1 TABLET ORAL EVERY 6 HOURS
COMMUNITY
Start: 2025-06-15 | End: 2025-07-15

## 2025-07-10 RX ORDER — ASPIRIN 81 MG/1
81 TABLET, CHEWABLE ORAL DAILY
Status: DISCONTINUED | OUTPATIENT
Start: 2025-07-10 | End: 2025-07-11 | Stop reason: HOSPADM

## 2025-07-10 RX ORDER — GABAPENTIN 400 MG/1
800 CAPSULE ORAL 4 TIMES DAILY
Status: DISCONTINUED | OUTPATIENT
Start: 2025-07-10 | End: 2025-07-10

## 2025-07-10 RX ADMIN — WATER 2000 MG: 1 INJECTION INTRAMUSCULAR; INTRAVENOUS; SUBCUTANEOUS at 14:54

## 2025-07-10 RX ADMIN — TIZANIDINE 4 MG: 4 TABLET ORAL at 08:11

## 2025-07-10 RX ADMIN — SODIUM CHLORIDE: 0.9 INJECTION, SOLUTION INTRAVENOUS at 12:53

## 2025-07-10 RX ADMIN — ASPIRIN 81 MG: 81 TABLET, CHEWABLE ORAL at 08:11

## 2025-07-10 RX ADMIN — ATORVASTATIN CALCIUM 40 MG: 40 TABLET, FILM COATED ORAL at 08:11

## 2025-07-10 RX ADMIN — GABAPENTIN 800 MG: 400 CAPSULE ORAL at 08:11

## 2025-07-10 RX ADMIN — OXYCODONE AND ACETAMINOPHEN 1 TABLET: 10; 325 TABLET ORAL at 16:58

## 2025-07-10 RX ADMIN — OXYCODONE AND ACETAMINOPHEN 1 TABLET: 10; 325 TABLET ORAL at 20:28

## 2025-07-10 RX ADMIN — VANCOMYCIN HYDROCHLORIDE 1000 MG: 1 INJECTION, POWDER, LYOPHILIZED, FOR SOLUTION INTRAVENOUS at 11:11

## 2025-07-10 RX ADMIN — ACETAMINOPHEN 650 MG: 325 TABLET ORAL at 20:28

## 2025-07-10 RX ADMIN — SUMATRIPTAN SUCCINATE 50 MG: 50 TABLET ORAL at 06:09

## 2025-07-10 RX ADMIN — OXYCODONE AND ACETAMINOPHEN 1 TABLET: 10; 325 TABLET ORAL at 10:28

## 2025-07-10 RX ADMIN — OXYCODONE AND ACETAMINOPHEN 1 TABLET: 10; 325 TABLET ORAL at 04:46

## 2025-07-10 RX ADMIN — WATER 2000 MG: 1 INJECTION INTRAMUSCULAR; INTRAVENOUS; SUBCUTANEOUS at 06:10

## 2025-07-10 RX ADMIN — TIZANIDINE 4 MG: 4 TABLET ORAL at 20:28

## 2025-07-10 RX ADMIN — ENOXAPARIN SODIUM 40 MG: 100 INJECTION SUBCUTANEOUS at 08:11

## 2025-07-10 RX ADMIN — SODIUM CHLORIDE: 0.9 INJECTION, SOLUTION INTRAVENOUS at 01:27

## 2025-07-10 RX ADMIN — GABAPENTIN 800 MG: 400 CAPSULE ORAL at 12:55

## 2025-07-10 RX ADMIN — GABAPENTIN 600 MG: 300 CAPSULE ORAL at 20:35

## 2025-07-10 ASSESSMENT — PAIN DESCRIPTION - LOCATION
LOCATION: ELBOW
LOCATION: BACK
LOCATION: BACK
LOCATION: HEAD
LOCATION: BACK
LOCATION: BACK
LOCATION: HEAD;BACK
LOCATION: BACK
LOCATION: HEAD

## 2025-07-10 ASSESSMENT — PAIN SCALES - GENERAL
PAINLEVEL_OUTOF10: 7
PAINLEVEL_OUTOF10: 5
PAINLEVEL_OUTOF10: 8
PAINLEVEL_OUTOF10: 6
PAINLEVEL_OUTOF10: 6
PAINLEVEL_OUTOF10: 8

## 2025-07-10 ASSESSMENT — PAIN - FUNCTIONAL ASSESSMENT
PAIN_FUNCTIONAL_ASSESSMENT: ACTIVITIES ARE NOT PREVENTED
PAIN_FUNCTIONAL_ASSESSMENT: PREVENTS OR INTERFERES SOME ACTIVE ACTIVITIES AND ADLS
PAIN_FUNCTIONAL_ASSESSMENT: PREVENTS OR INTERFERES SOME ACTIVE ACTIVITIES AND ADLS
PAIN_FUNCTIONAL_ASSESSMENT: ACTIVITIES ARE NOT PREVENTED

## 2025-07-10 ASSESSMENT — PAIN DESCRIPTION - PAIN TYPE
TYPE: ACUTE PAIN
TYPE: CHRONIC PAIN

## 2025-07-10 ASSESSMENT — PAIN DESCRIPTION - ORIENTATION
ORIENTATION: ANTERIOR
ORIENTATION: MID;LEFT
ORIENTATION: MID;LOWER
ORIENTATION: LOWER;MID
ORIENTATION: POSTERIOR;LOWER;ANTERIOR
ORIENTATION: RIGHT

## 2025-07-10 ASSESSMENT — PAIN DESCRIPTION - ONSET
ONSET: ON-GOING
ONSET: ON-GOING

## 2025-07-10 ASSESSMENT — PAIN DESCRIPTION - DESCRIPTORS
DESCRIPTORS: ACHING
DESCRIPTORS: SHARP
DESCRIPTORS: ACHING

## 2025-07-10 ASSESSMENT — PAIN DESCRIPTION - FREQUENCY
FREQUENCY: CONTINUOUS
FREQUENCY: CONTINUOUS

## 2025-07-10 NOTE — CONSULTS
Hx and P/E :Infectious D.       Patient - Woodrow Diez,  Age - 69 y.o.    - 1955      Room Number - 7K-18/018-A   N -  [id number]   [facility] # - [id number]  Date of Admission -  2025  8:49 PM  Patient's PCP: No primary care provider on file.     Requesting Physician: Jeff Garcia DO    CHIEF COMPLAINT:     Right upper extremity cellulitis    ASSESSMENT AND PLAN     Patient is a 69-year-old male who I am consulted for cellulitis involving the right upper extremity.    This patient has had significant improvement in previously noted erythema and induration involving the right arm.  This is essentially back to normal and there is no evidence of tissue thickening to suggest underlying presence of abscess.  Currently on therapy with vancomycin and cefazolin.  I have discontinued cefazolin as vancomycin will provide adequate coverage of streptococcal and staphylococcal species.  Recommend continuing therapy for 10 days total with end of therapy on doxycycline 100 mg twice daily on .      HISTORY OF PRESENT ILLNESS       This is a very pleasant 69 y.o. male who was admitted to the hospital with a chief complaints of right upper extremity cellulitis.  Infectious disease consulted for antibiotic recommendations and further evaluation.    Patient originally presented on  with complaints of right upper extremity pain that started approximately 2 days prior to admission.  He denies any precipitating triggers such as wounds/trauma/injuries to this area.  He has never had any prior episodes of cellulitis in the past.  He states that the pain progressively worsened and he ultimately presented for further evaluation.  On arrival to emergency department, patient was noted to have mildly elevated ESR without evidence of systemic infection.  He was initiated on therapy with vancomycin and cefazolin.  On examination today, there has been significant improvement in erythema        Scheduled Meds:   aspirin  81 mg Oral Daily    atorvastatin  40 mg Oral Daily    oxyCODONE-acetaminophen  1 tablet Oral Q6H    vancomycin (VANCOCIN) intermittent dosing (placeholder)   Other RX Placeholder    gabapentin  600 mg Oral TID    sodium chloride flush  5-40 mL IntraVENous 2 times per day    enoxaparin  40 mg SubCUTAneous Daily     Continuous Infusions:   sodium chloride      sodium chloride 100 mL/hr at 07/10/25 1253     PRN Meds:albuterol sulfate HFA, tiZANidine, sodium chloride flush, sodium chloride, potassium chloride **OR** potassium alternative oral replacement **OR** potassium chloride, magnesium sulfate, ondansetron **OR** ondansetron, polyethylene glycol, acetaminophen **OR** acetaminophen, ketorolac, morphine **OR** morphine  Allergies:   ALLERGIES: Norco [hydrocodone-acetaminophen]           SOCIAL HISTORY:     TOBACCO:   reports that he has been smoking cigarettes. He has a 24 pack-year smoking history. He has never used smokeless tobacco.     ETOH:   reports that he does not currently use alcohol.        FAMILY HISTORY:         Problem Relation Age of Onset    High Blood Pressure Mother     High Blood Pressure Father     Heart Disease Father         CAD    Heart Surgery Father 60        CAGB    COPD Sister     Emphysema Sister     Cancer Sister         Throat CA/bone cancer       REVIEW OF SYSTEMS:     10 point review of systems performed which is otherwise negative    PHYSICAL EXAM:     /78   Pulse 62   Temp 98.4 °F (36.9 °C) (Oral)   Resp 18   Ht 1.778 m (5' 10\")   Wt 98.7 kg (217 lb 11.2 oz)   SpO2 99%   BMI 31.24 kg/m²   General:  Awake, alert, not in distress.  HEENT: pink conjunctiva, unicteric sclera, moist oral mucosa.  Chest:  bilateral air entry, Clear to auscultation,   Cardiovascular:  RRR ,S1S2, no murmur or gallop.  Abdomen:  Soft, non tender to palpation.  Extremities: no edema  Skin:  Warm and dry.  CNS: aox3        LABS:     CBC:   Recent Labs

## 2025-07-10 NOTE — ED PROVIDER NOTES
PATIENT NAME: Woodrow Diez  MRN: [id number]  : 1955  ARANDA: 2025    I performed a history and physical examination of the patient and discussed management with the Resident. I reviewed the Resident's note and agree with the documented findings and plan of care. Any areas of disagreement are noted on the chart. I was personally present for the key portions of any procedures and have documented in the chart those procedures where I was not present during the key portions. I have reviewed the emergency nurses triage note and agree with the chief complaint, past medical history, past surgical history, allergies, medications, social and family history as documented unless otherwise noted below.    MEDICAL DEDISION MAKING (MDM)     Woodrow Diez is a 69 y.o. male presents with right arm erythema which became worse.    He noticed right arm redness since 2025.  [facility]) and was diagnosed cellulitis.  He was administered 1 dose of IV Rocephin and then discharged with oral cephalexin.  Redness is getting bigger today with increasing pain.     Physical exam: AVSS.  Heart lungs unremarkable.  Soft abdomen without tenderness.  Right elbow and the right forearm show large area of erythema, swelling, and tenderness which get bigger compared to marking at outside ED, consistent with worsening cellulitis.    IV Ancef and vancomycin are given.  Admitted to hospitalist service.         Vitals:    25 2206 25 2303 07/10/25 0014 07/10/25 0104   BP: (!) 144/81 (!) 165/96 (!) 148/88 (!) 162/82   Pulse: 70 68 71 67   Resp: 13  18   Temp:    97.8 °F (36.6 °C)   TempSrc:    Oral   SpO2: 97% 97% 97% 97%   Weight:       Height:         Labs Reviewed   CBC WITH AUTO DIFFERENTIAL - Abnormal; Notable for the following components:       Result Value    WBC 12.7 (*)     RDW-CV 14.7 (*)     RDW-SD 46.7 (*)     Neutrophils Absolute 8.0 (*)     All other components within normal

## 2025-07-10 NOTE — PROGRESS NOTES
[facility]   Pharmacy Pharmacokinetic Monitoring Service - Vancomycin     Woodrow Diez is a 69 y.o. male starting on vancomycin therapy for SSTI. Pharmacy consulted by Brianda Luo CNP for monitoring and adjustment.    Target Concentration: Goal AUC/FELIPE 400-600 mg*hr/L    Additional Antimicrobials: cefazolin    Pertinent Laboratory Values:   Wt Readings from Last 1 Encounters:   07/10/25 98.7 kg (217 lb 11.2 oz)     Estimated Creatinine Clearance: 94 mL/min (based on SCr of 0.9 mg/dL).  Recent Labs     07/08/25  0817 07/09/25 2110   CREATININE 0.9 0.9     Pertinent Cultures:  Date Source Results   7/9/2025 MRSA    7/9/2025 Blood x 2    MRSA Nasal Swab: was ordered by provider, awaiting results.    Plan:  Dosing recommendations based on Bayesian software  Start vancomycin 2500 mg loading dose, followed by 1000 mg IV q12h for anticipated AUC of 476 and trough concentration of 14.1 at steady state  Renal labs as indicated   Vancomycin concentration ordered for tomorrow with AM labs  Pharmacy will monitor renal function and adjust therapy as indicated    Thank you for the consult,  Amarilis Rivers RPh, SHADY, PETERP  7/10/2025     3:31 AM

## 2025-07-10 NOTE — PROGRESS NOTES
Pharmacy Renal Adjustment    Pharmacy renally adjusted the following medication(s) per P&T approved policy: gabapentin    Recent Labs     07/09/25  2110 07/10/25  0330   BUN 28* 21   CREATININE 0.9 1.4*     eGFR:   Recent Labs     07/08/25  0817 07/09/25  2110 07/10/25  0330   LABGLOM >90 > 90 55*     Estimated Creatinine Clearance: 60 mL/min (A) (based on SCr of 1.4 mg/dL (H)).    Assessment:  ANGELLA    Plan:   Decrease gabapentin from 800 mg QID to 600 mg TID (1.8 g max/day)    Please call pharmacy with any questions.    Joycelyn Clarke, PharmD, BCPS  7/10/2025  1:48 PM

## 2025-07-10 NOTE — ED TRIAGE NOTES
Pt to ED via private vehicle w/ report of right arm infection. Pt reports that he was seen yesterday at [facility] in [place] and reports that they dx arm infection. Pt reports that they said it was not cellulitis. Pt reports that they gave him IV antibiotics and he is taking oral antibiotics at home. Pt reports that redness has spread since yesterday. Pt presents w/ redness and warmth to dorsal elbow and anterior forearm. A&O X 4. Pt breathing even and unlabored.

## 2025-07-10 NOTE — RT PROTOCOL NOTE
RT Inhaler-Nebulizer Bronchodilator Protocol Note    There is a bronchodilator order in the chart from a provider indicating to follow the RT Bronchodilator Protocol and there is an “Initiate RT Inhaler-Nebulizer Bronchodilator Protocol” order as well (see protocol at bottom of note).    CXR Findings:  No results found.    The findings from the last RT Protocol Assessment were as follows:   History Pulmonary Disease: Smoker 15 pack years or more  Respiratory Pattern: Regular pattern and RR 12-20 bpm  Breath Sounds: Slightly diminished and/or crackles  Cough: Strong, spontaneous, non-productive  Indication for Bronchodilator Therapy: On home bronchodilators, Decreased or absent breath sounds  Bronchodilator Assessment Score: 3    Aerosolized bronchodilator medication orders have been revised according to the RT Inhaler-Nebulizer Bronchodilator Protocol below.    Respiratory Therapist to perform RT Therapy Protocol Assessment initially then follow the protocol.  Repeat RT Therapy Protocol Assessment PRN for score 0-3 or on second treatment, BID, and PRN for scores above 3.    No Indications - adjust the frequency to every 6 hours PRN wheezing or bronchospasm, if no treatments needed after 48 hours then discontinue using Per Protocol order mode.     If indication present, adjust the RT bronchodilator orders based on the Bronchodilator Assessment Score as indicated below.  Use Inhaler orders unless patient has one or more of the following: on home nebulizer, not able to hold breath for 10 seconds, is not alert and oriented, cannot activate and use MDI correctly, or respiratory rate 25 breaths per minute or more, then use the equivalent nebulizer order(s) with same Frequency and PRN reasons based on the score.  If a patient is on this medication at home then do not decrease Frequency below that used at home.    0-3 - enter or revise RT bronchodilator order(s) to equivalent RT Bronchodilator order with Frequency of every

## 2025-07-10 NOTE — CARE COORDINATION
Case Management Assessment Initial Evaluation    Date/Time of Evaluation: 7/10/2025 8:45 AM  Assessment Completed by: Crista Langley RN    If patient is discharged prior to next notation, then this note serves as note for discharge by case management.    Patient Name: Woodrow Diez                   YOB: 1955  Diagnosis: Cellulitis of right arm [L03.113]  Cellulitis of right upper extremity [L03.113]                   Date / Time: 7/9/2025  8:49 PM  Location: [facility]     Patient Admission Status: Inpatient   Readmission Risk Low 0-14, Mod 15-19), High > 20: Readmission Risk Score: 7.7    Current PCP: No primary care provider on file.  Health Care Decision Makers:   Primary Decision Maker: Aniceto Altamirano - Niece/Nephew - [phone]    Additional Case Management Notes: to ED with right arm erythema which became worse.  Initially  went to [facility] ED ([facility] ED) and was diagnosed cellulitis.  He was administered 1 dose of IV Rocephin and then discharged with oral cephalexin     On Ancef IV q 8 hours. Creat 1.4  Procedures: na    Imaging: none    Patient Goals/Plan/Treatment Preferences: Met withTerry; lives home with wife. They reside in mobile home, uses no DME, and follow for possible IV antibiotics.               07/10/25 1151   Service Assessment   Patient Orientation Alert and Oriented   Cognition Alert   History Provided By Patient   Primary Caregiver Self   Accompanied By/Relationship unaccomp   Support Systems Spouse/Significant Other;Family Members;Children   Patient's Healthcare Decision Maker is: Legal Next of Kin   PCP Verified by CM Yes   Last Visit to PCP Within last two years  (requests new PCP here)   Prior Functional Level Independent in ADLs/IADLs   Current Functional Level Independent in ADLs/IADLs   Can patient return to prior living arrangement Yes   Ability to make needs known: Good   Family able to assist with home care needs: Yes   Would you like for me to

## 2025-07-10 NOTE — ED NOTES
Pt ambulated steadily by own power to and from bathroom. Pt medicated per MAR. Pt updated on POC, pt verbalizes understanding. Pt requesting pain medication. Vitals collected. Pt breathing even and unlabored. Call light in reach.

## 2025-07-10 NOTE — ED NOTES
ED to inpatient nurses report      Chief Complaint:  Chief Complaint   Patient presents with    Arm Infection     Present to ED from: home    MOA:     LOC: alert and orientated to name, place, date  Mobility: Independent  Oxygen Baseline: RA    Current needs required: RA     Code Status:   Full Code    What abnormal results were found and what did you give/do to treat them? Cellulitis, antibiotics  Any procedures or intervention occur? Labs, admission    Mental Status:  Level of Consciousness: Alert (0)    Psych Assessment:        Vitals:  Patient Vitals for the past 24 hrs:   BP Temp Temp src Pulse Resp SpO2 Height Weight   07/10/25 0014 (!) 148/88 -- -- 71 23 97 % -- --   07/09/25 2303 (!) 165/96 -- -- 68 21 97 % -- --   07/09/25 2206 (!) 144/81 -- -- 70 13 97 % -- --   07/09/25 2054 (!) 152/82 98.3 °F (36.8 °C) Oral 83 21 99 % 1.778 m (5' 10\") 95.3 kg (210 lb)        LDAs:   Peripheral IV 07/09/25 Left;Anterior Forearm (Active)   Site Assessment Clean, dry & intact 07/09/25 2112   Line Status Blood return noted;Flushed;Specimen collected;Normal saline locked 07/09/25 2112   Dressing Intervention New 07/09/25 2112       Ambulatory Status:  Presents to emergency department  because of falls (Syncope, seizure, or loss of consciousness): No, Age > 70: No, Altered Mental Status, Intoxication with alcohol or substance confusion (Disorientation, impaired judgment, poor safety awaremess, or inability to follow instructions): No, Impaired Mobility: Ambulates or transfers with assistive devices or assistance; Unable to ambulate or transer.: No, Nursing Judgement: No    Diagnosis:  DISPOSITION Admitted 07/09/2025 11:54:41 PM   Final diagnoses:   Cellulitis of right upper extremity        Consults:  PHARMACY TO DOSE VANCOMYCIN     Pain Score:  Pain Assessment  Pain Assessment: 0-10  Pain Level: 6    C-SSRS:   Risk of Suicide: No Risk    Sepsis Screening:       [place] Fall Risk:  [place] 1 Fall Risk  Presents to emergency      GERD (gastroesophageal reflux disease)     Headache(784.0)     Hyperlipidemia     Hypertension     Low back pain     was on Xanax from pain clinic - no longer has script    Prolonged emergence from general anesthesia     Snoring     no apnea, BMI 27, neck circ.15 inches, will wake coughing, no choking, no daytime somnolence    Tobacco abuse            Electronically signed by Kayla [place]isch[place], RN on 7/10/2025 at 12:22 AM

## 2025-07-10 NOTE — ED NOTES
Pt medicated per MAR. Pt resting on cot at this time. Pt updated on POC, pt verbalizes understanding. Pt provided ice water upon request. Vitals collected. Pt breathing even and unlabored. Call light in reach.

## 2025-07-10 NOTE — PROGRESS NOTES
Hospitalist Progress Note    Patient:  Woodrow Diez      Unit/Bed:7K-18/018-A    YOB: 1955    MRN: [id number]       Acct: [id number]     PCP: No primary care provider on file.    Date of Admission: 7/9/2025    Assessment/Plan:    Right arm cellulitis: Failed outpatient treatment.  Presented with right arm swelling, redness.  Presented to ED 7/8, given Keflex.  XR right elbow moderate soft tissue swelling adjacent to elbow predominantly along the proximal ulna, no osseous abnormality.  CRP 3.50, ESR 21, WBC 12.7.  Blood cultures x 2 collected.  Started on cefazolin and vancomycin for empiric antibiotic coverage.  MRSA screen pending.  De-escalate appropriate per cultures and sensitivities.  Multimodal pain management initiated.  Consult ID.  ANGELLA: Likely prerenal etiology in setting of decreased p.o. oral intake with possible concomitant drug injury.  Baseline CR 0.9.  Escalation to 1.4, BUN 21 with BUN:Cr <20.  Check urine electrolytes, urine urea/creatinine, and urine eosinophils.  UA negative.  Defer [place].  Check uric acid.  On IVF.  Limit nephrotoxic agent.  Monitor renal function with daily BMP.  HTN: Per chart review.  No home antihypertensives.  Monitor BP.  HLD: Continue Lipitor 40 mg p.o. daily  COPD: no formal PFTs.  Not on home O2.  Endorses smoking, see below.  Continue home albuterol.  Advised outpatient pulmonology follow-up for formal PFTs and up titration of inhalers as appropriate.  Chronic back pain: Continue home gabapentin 800 mg p.o. 4 times daily  Tobacco use disorder: Endorses smoking 0.5 PPD for 24 PPY.  Counseled on smoking cessation.  Nicotine patch offered.        Expected discharge date: 7/11/2025    Disposition:    [x] Home       [] TCU       [] Rehab       [] Psych       [] SNF       [] Long Term Care Facility       [] Other-    Chief Complaint: Arm infection    Hospital Course:   The patient is a 69-year-old male with a PMH of HTN, HLD, COPD, chronic back pain,

## 2025-07-10 NOTE — H&P
Hospitalist History & Physical    Patient:  Woodrow Diez    Unit/Bed:21/021A  YOB: 1955  MRN: [id number]   Acct: [id number]   PCP: No primary care provider on file.  Code Status: Prior    Date of Service: Pt seen/examined on 07/09/25 and admitted to Inpatient with expected LOS greater than two midnights due to medical therapy.     Chief Complaint: Arm Infection    Assessment/Plan:    Right arm cellulitis, failed OP treatment: Reports right arm swelling and redness, worsening since being seen in ED yesterday and placed on Keflex. 7/8/25 XR Right Elbow: moderate soft tissue swelling adjacent to the elbow predominately along the proximal ulna, no acute osseous abnormality. CRP 3.50, ESR 21. WBC 12.7.   Blood cultures x2 pending  Continue Cefazolin, Pharm to dose Vancomycin-may deescalate as needed  MRSA screening  Pain control: PRN Toradol, Oxycodone, Morphine-may deescalate as needed  Gentle IVF overnight  Consider ID consult pending clinical course    COPD, not in acute exacerbation: No PFTs noted on chart review.   Continue PRN Albuterol    HTN: Not on any antihypertensives.   Continue ASA    Chronic back pain:   Continue gabapentin, PRN Flexeril    Tobacco use disorder: Reports smoking 0.5 ppd.   Encourage cessation  Denies need for nicotine patch      DVT prophylaxis: Lovenox  Diet: No diet orders on file  PT/OT: No  Tele: Yes  Code Status: Prior      History of Present Illness:  Woodrow Diez is a 69 y.o. male with PMHx of COPD, HTN, Chronic back pain who presented to [facility] with chief complaint of Arm Infection. 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 States that he noticed his right elbow was mildly painful starting on Monday. Denies any trauma, injury or bug bites to the area. Does report falling a couple of weeks ago and his back being sore from that but did not hit his elbow. States as the day progressed his elbow became swollen. States that on Tuesday his elbow  pelvis T10 and pelvis performed by Rubén Arthur MD at [facility] OR    PAIN MANAGEMENT PROCEDURE Right 9/28/2021    right cluneal nerve block performed by Linden Shetty DO at [facility] OR    OH UNLISTED PROCEDURE SPINE N/A 4/18/2018    LUMBAR LAMINECTOMY WITH PSF L2-S1, PLIF L5-S1 WITH SOLERA 5.5 CAPSTONE AND OPAL DBM LOCAL BONE GRAFGING performed by Hi Taylor MD at [facility] OR    ROTATOR CUFF REPAIR Left 01/2016    Dr. Pedraza @ [facility]    UPPER GASTROINTESTINAL ENDOSCOPY         Home Medications:   No current facility-administered medications on file prior to encounter.     Current Outpatient Medications on File Prior to Encounter   Medication Sig Dispense Refill    cephALEXin (KEFLEX) 500 MG capsule Take 1 capsule by mouth 4 times daily for 7 days 28 capsule 0    tiZANidine (ZANAFLEX) 4 MG tablet Take 1 tablet by mouth 3 times daily as needed      gabapentin (NEURONTIN) 600 MG tablet TAKE 2 TABLETS BY MOUTH 3 TIMES DAILY FOR 30 DAYS. 180 tablet 2    atorvastatin (LIPITOR) 40 MG tablet Take 1 tablet by mouth daily      albuterol sulfate HFA (PROVENTIL;VENTOLIN;PROAIR) 108 (90 Base) MCG/ACT inhaler Inhale 2 puffs into the lungs every 4 hours as needed for Wheezing or Shortness of Breath         Allergies:    Norco [hydrocodone-acetaminophen]    Social History:    reports that he has been smoking cigarettes. He has a 24 pack-year smoking history. He has never used smokeless tobacco. He reports current alcohol use. He reports that he does not use drugs.    Family History:       Problem Relation Age of Onset    High Blood Pressure Mother     High Blood Pressure Father     Heart Disease Father         CAD    Heart Surgery Father 60        CAGB    COPD Sister     Emphysema Sister     Cancer Sister         Throat CA/bone cancer       Diet:  No diet orders on file      Physical Exam:  BP (!) 165/96   Pulse 68   Temp 98.3 °F (36.8 °C) (Oral)   Resp 21   Ht 1.778 m (5' 10\")   Wt 95.3 kg (210 lb)   SpO2 97%

## 2025-07-10 NOTE — PLAN OF CARE
Problem: Discharge Planning  Goal: Discharge to home or other facility with appropriate resources  7/10/2025 1306 by Christa Arenas RN  Outcome: Progressing  Flowsheets (Taken 7/10/2025 0149 by Tameka Peña, RN)  Discharge to home or other facility with appropriate resources:   Identify barriers to discharge with patient and caregiver   Arrange for needed discharge resources and transportation as appropriate   Identify discharge learning needs (meds, wound care, etc)     Problem: Pain  Goal: Verbalizes/displays adequate comfort level or baseline comfort level  7/10/2025 1306 by Christa Arenas RN  Outcome: Progressing  Flowsheets (Taken 7/10/2025 0149 by Tameka Peña, RN)  Verbalizes/displays adequate comfort level or baseline comfort level:   Encourage patient to monitor pain and request assistance   Assess pain using appropriate pain scale   Administer analgesics based on type and severity of pain and evaluate response   Implement non-pharmacological measures as appropriate and evaluate response     Problem: Safety - Adult  Goal: Free from fall injury  7/10/2025 1306 by Christa Arenas RN  Outcome: Progressing  Flowsheets (Taken 7/10/2025 0149 by Tameka Peña, RN)  Free From Fall Injury: Instruct family/caregiver on patient safety     Problem: ABCDS Injury Assessment  Goal: Absence of physical injury  7/10/2025 1306 by Christa Arenas RN  Outcome: Progressing  Flowsheets (Taken 7/10/2025 1306)  Absence of Physical Injury: Implement safety measures based on patient assessment     Problem: Neurosensory - Adult  Goal: Achieves stable or improved neurological status  7/10/2025 1306 by Christa Arenas RN  Outcome: Progressing  Flowsheets (Taken 7/10/2025 0149 by Tameka Peña, RN)  Achieves stable or improved neurological status:   Assess for and report changes in neurological status   Monitor temperature, glucose, and sodium. Initiate appropriate interventions as ordered     Problem: Skin/Tissue Integrity -

## 2025-07-10 NOTE — PLAN OF CARE
Problem: Discharge Planning  Goal: Discharge to home or other facility with appropriate resources  Outcome: Progressing  Flowsheets (Taken 7/10/2025 0149)  Discharge to home or other facility with appropriate resources:   Identify barriers to discharge with patient and caregiver   Arrange for needed discharge resources and transportation as appropriate   Identify discharge learning needs (meds, wound care, etc)     Problem: Pain  Goal: Verbalizes/displays adequate comfort level or baseline comfort level  Outcome: Progressing  Flowsheets (Taken 7/10/2025 0149)  Verbalizes/displays adequate comfort level or baseline comfort level:   Encourage patient to monitor pain and request assistance   Assess pain using appropriate pain scale   Administer analgesics based on type and severity of pain and evaluate response   Implement non-pharmacological measures as appropriate and evaluate response     Problem: Safety - Adult  Goal: Free from fall injury  Outcome: Progressing  Flowsheets (Taken 7/10/2025 0149)  Free From Fall Injury: Instruct family/caregiver on patient safety     Problem: ABCDS Injury Assessment  Goal: Absence of physical injury  Outcome: Progressing  Flowsheets (Taken 7/10/2025 0149)  Absence of Physical Injury: Implement safety measures based on patient assessment     Problem: Neurosensory - Adult  Goal: Achieves stable or improved neurological status  Outcome: Progressing  Flowsheets (Taken 7/10/2025 0149)  Achieves stable or improved neurological status:   Assess for and report changes in neurological status   Monitor temperature, glucose, and sodium. Initiate appropriate interventions as ordered     Problem: Skin/Tissue Integrity - Adult  Goal: Skin integrity remains intact  Outcome: Progressing  Flowsheets (Taken 7/10/2025 0149)  Skin Integrity Remains Intact:   Monitor for areas of redness and/or skin breakdown   Assess vascular access sites hourly   Turn and reposition as indicated   Check visual cues

## 2025-07-10 NOTE — PROGRESS NOTES
Pt admitted to  7K18 in a wheelchair and from ED.     Complaints: Right Arm Cellulitis.      IV Vancomycin infusing into the hand left, condition patent and no redness at a rate of 200 mls/ hour with about 50 mls in the bag still. IV site free of s/s of infection or infiltration. Vital signs obtained. Assessment and data collection initiated.     Two nurse skin assessment performed by Tameka CORDERO and Elise MARIA RN. Oriented to room.     Explained patients right to have family, representative or physician notified of their admission.  Patient has Declined for physician to be notified.  Patient has Declined for family/representative to be notified.    The patient is interested in [facility]’s meds to beds program?:  No    Policies and procedures for  explained. All questions answered with no further questions at this time. Fall prevention and safety  discussed with patient.  Bed alarm on. Call light in reach.

## 2025-07-10 NOTE — ED PROVIDER NOTES
[facility] EMERGENCY DEPARTMENT    EMERGENCY MEDICINE     Patient Name: Woodrow Diez  MRN: [id number]  YOB: 1955  Date of Evaluation: 7/9/2025  Treating Resident Physician: Emmanuel Fonseca DO  Supervising Physician: Dr. Bay Joy MD    Chief Complaint   Patient presents with    Arm Infection       History of Present Illness   History obtained from the patient.    Woodrow Diez is a 69 y.o. male who presents to the emergency department for evaluation of arm swelling.   Patient was seen yesterday at [place] emergency department for redness and pain of the right elbow.  Patient was diagnosed with cellulitis and was given a dose of biotics in the ED and discharged on Keflex.  Patient denied any injury or falls.  No source of injury.  Patient reports since he has been home he has been taking his dose of antibiotics, however his right arm has had increasing pain swelling and redness that spread patient came to the ER for further evaluation.    Past Medical History:   Diagnosis Date    Anxiety     Arthritis     Bradycardia     HR 50 on preop EKG    Cervical spondylosis with myelopathy     COPD (chronic obstructive pulmonary disease) (HCC)     breathing worse in the heat    Depression     GERD (gastroesophageal reflux disease)     Headache(784.0)     Hyperlipidemia     Hypertension     Low back pain     was on Xanax from pain clinic - no longer has script    Prolonged emergence from general anesthesia     Snoring     no apnea, BMI 27, neck circ.15 inches, will wake coughing, no choking, no daytime somnolence    Tobacco abuse        Past Surgical History:   Procedure Laterality Date    BACK INJECTION Right 8/3/2021    right sacroiliac joint injection performed by Linden Shetty DO at [facility] [place] OR    BACK SURGERY  11/2015    cervical fusion C3-5? Dr. Taylor    CATARACT REMOVAL WITH IMPLANT Bilateral     Dr. Saeed    COLONOSCOPY  2014    FACET JOINT INJECTION N/A 9/7/2022    thoracic

## 2025-07-11 VITALS
OXYGEN SATURATION: 100 % | SYSTOLIC BLOOD PRESSURE: 155 MMHG | WEIGHT: 220.7 LBS | HEART RATE: 51 BPM | DIASTOLIC BLOOD PRESSURE: 85 MMHG | HEIGHT: 70 IN | BODY MASS INDEX: 31.6 KG/M2 | TEMPERATURE: 97.8 F | RESPIRATION RATE: 18 BRPM

## 2025-07-11 LAB
ANION GAP SERPL CALC-SCNC: 10 MEQ/L (ref 8–16)
BACTERIA BLD AEROBE CULT: NORMAL
BACTERIA BLD AEROBE CULT: NORMAL
BASOPHILS ABSOLUTE: 0.1 THOU/MM3 (ref 0–0.1)
BASOPHILS NFR BLD AUTO: 0.9 %
BUN SERPL-MCNC: 13 MG/DL (ref 8–23)
CALCIUM SERPL-MCNC: 9.1 MG/DL (ref 8.8–10.2)
CHLORIDE SERPL-SCNC: 106 MEQ/L (ref 98–111)
CO2 SERPL-SCNC: 24 MEQ/L (ref 22–29)
CREAT SERPL-MCNC: 0.8 MG/DL (ref 0.7–1.2)
DEPRECATED RDW RBC AUTO: 47.5 FL (ref 35–45)
EOSINOPHIL NFR BLD AUTO: 5.3 %
EOSINOPHILS ABSOLUTE: 0.4 THOU/MM3 (ref 0–0.4)
ERYTHROCYTE [DISTWIDTH] IN BLOOD BY AUTOMATED COUNT: 14.6 % (ref 11.5–14.5)
GFR SERPL CREATININE-BSD FRML MDRD: > 90 ML/MIN/1.73M2
GLUCOSE SERPL-MCNC: 100 MG/DL (ref 74–109)
HCT VFR BLD AUTO: 42.6 % (ref 42–52)
HGB BLD-MCNC: 14 GM/DL (ref 14–18)
IMM GRANULOCYTES # BLD AUTO: 0.02 THOU/MM3 (ref 0–0.07)
IMM GRANULOCYTES NFR BLD AUTO: 0.3 %
LYMPHOCYTES ABSOLUTE: 2.2 THOU/MM3 (ref 1–4.8)
LYMPHOCYTES NFR BLD AUTO: 29.7 %
MCH RBC QN AUTO: 29.4 PG (ref 26–33)
MCHC RBC AUTO-ENTMCNC: 32.9 GM/DL (ref 32.2–35.5)
MCV RBC AUTO: 89.5 FL (ref 80–94)
MONOCYTES ABSOLUTE: 0.9 THOU/MM3 (ref 0.4–1.3)
MONOCYTES NFR BLD AUTO: 11.7 %
MRSA SPEC QL CULT: NORMAL
NEUTROPHILS ABSOLUTE: 3.9 THOU/MM3 (ref 1.8–7.7)
NEUTROPHILS NFR BLD AUTO: 52.1 %
NRBC BLD AUTO-RTO: 0 /100 WBC
PLATELET # BLD AUTO: 235 THOU/MM3 (ref 130–400)
PMV BLD AUTO: 10.5 FL (ref 9.4–12.4)
POTASSIUM SERPL-SCNC: 4.4 MEQ/L (ref 3.5–5.2)
RBC # BLD AUTO: 4.76 MILL/MM3 (ref 4.7–6.1)
SODIUM SERPL-SCNC: 140 MEQ/L (ref 135–145)
VANCOMYCIN SERPL-MCNC: 5.7 UG/ML (ref 10–39.9)
WBC # BLD AUTO: 7.5 THOU/MM3 (ref 4.8–10.8)

## 2025-07-11 PROCEDURE — 85025 COMPLETE CBC W/AUTO DIFF WBC: CPT

## 2025-07-11 PROCEDURE — 99239 HOSP IP/OBS DSCHRG MGMT >30: CPT | Performed by: STUDENT IN AN ORGANIZED HEALTH CARE EDUCATION/TRAINING PROGRAM

## 2025-07-11 PROCEDURE — 80048 BASIC METABOLIC PNL TOTAL CA: CPT

## 2025-07-11 PROCEDURE — 99232 SBSQ HOSP IP/OBS MODERATE 35: CPT | Performed by: STUDENT IN AN ORGANIZED HEALTH CARE EDUCATION/TRAINING PROGRAM

## 2025-07-11 PROCEDURE — 6360000002 HC RX W HCPCS: Performed by: PHARMACIST

## 2025-07-11 PROCEDURE — 80202 ASSAY OF VANCOMYCIN: CPT

## 2025-07-11 PROCEDURE — 6360000002 HC RX W HCPCS

## 2025-07-11 PROCEDURE — 2500000003 HC RX 250 WO HCPCS

## 2025-07-11 PROCEDURE — 36415 COLL VENOUS BLD VENIPUNCTURE: CPT

## 2025-07-11 PROCEDURE — 6370000000 HC RX 637 (ALT 250 FOR IP)

## 2025-07-11 RX ORDER — DOXYCYCLINE HYCLATE 100 MG
100 TABLET ORAL 2 TIMES DAILY
Qty: 18 TABLET | Refills: 0 | Status: SHIPPED | OUTPATIENT
Start: 2025-07-11 | End: 2025-07-20

## 2025-07-11 RX ORDER — OXYCODONE AND ACETAMINOPHEN 5; 325 MG/1; MG/1
1 TABLET ORAL EVERY 6 HOURS PRN
Qty: 12 TABLET | Refills: 0 | Status: CANCELLED | OUTPATIENT
Start: 2025-07-11 | End: 2025-07-14

## 2025-07-11 RX ADMIN — ENOXAPARIN SODIUM 40 MG: 100 INJECTION SUBCUTANEOUS at 08:40

## 2025-07-11 RX ADMIN — OXYCODONE AND ACETAMINOPHEN 1 TABLET: 10; 325 TABLET ORAL at 10:46

## 2025-07-11 RX ADMIN — ATORVASTATIN CALCIUM 40 MG: 40 TABLET, FILM COATED ORAL at 08:40

## 2025-07-11 RX ADMIN — SODIUM CHLORIDE, PRESERVATIVE FREE 10 ML: 5 INJECTION INTRAVENOUS at 08:40

## 2025-07-11 RX ADMIN — TIZANIDINE 4 MG: 4 TABLET ORAL at 08:40

## 2025-07-11 RX ADMIN — Medication 1500 MG: at 10:46

## 2025-07-11 RX ADMIN — ASPIRIN 81 MG: 81 TABLET, CHEWABLE ORAL at 08:39

## 2025-07-11 RX ADMIN — ACETAMINOPHEN 650 MG: 325 TABLET ORAL at 05:50

## 2025-07-11 RX ADMIN — OXYCODONE AND ACETAMINOPHEN 1 TABLET: 10; 325 TABLET ORAL at 05:49

## 2025-07-11 RX ADMIN — GABAPENTIN 600 MG: 300 CAPSULE ORAL at 08:40

## 2025-07-11 ASSESSMENT — PAIN - FUNCTIONAL ASSESSMENT
PAIN_FUNCTIONAL_ASSESSMENT: ACTIVITIES ARE NOT PREVENTED
PAIN_FUNCTIONAL_ASSESSMENT: ACTIVITIES ARE NOT PREVENTED

## 2025-07-11 ASSESSMENT — PAIN DESCRIPTION - DESCRIPTORS
DESCRIPTORS: ACHING
DESCRIPTORS: ACHING

## 2025-07-11 ASSESSMENT — PAIN DESCRIPTION - LOCATION
LOCATION: BACK
LOCATION: BACK

## 2025-07-11 ASSESSMENT — PAIN DESCRIPTION - ORIENTATION
ORIENTATION: LOWER
ORIENTATION: MID;LOWER

## 2025-07-11 ASSESSMENT — PAIN SCALES - GENERAL
PAINLEVEL_OUTOF10: 8
PAINLEVEL_OUTOF10: 8
PAINLEVEL_OUTOF10: 0
PAINLEVEL_OUTOF10: 0
PAINLEVEL_OUTOF10: 8

## 2025-07-11 ASSESSMENT — PAIN DESCRIPTION - PAIN TYPE: TYPE: CHRONIC PAIN

## 2025-07-11 NOTE — CARE COORDINATION
7/11/25, 1:11 PM EDT    Patient goals/plan/ treatment preferences discussed by  and .  Patient goals/plan/ treatment preferences reviewed with patient/ family.  Patient/ family verbalize understanding of discharge plan and are in agreement with goal/plan/treatment preferences.  Understanding was demonstrated using the teach back method.  AVS provided by RN at time of discharge, which includes all necessary medical information pertaining to the patients current course of illness, treatment, post-discharge goals of care, and treatment preferences.     Services At/After Discharge: None    Home with wife and PO abx.

## 2025-07-11 NOTE — PROGRESS NOTES
Pt discharged to main lobby with all of his personal belongings. AVS reviewed at the time of discharge. Patient agreeable to  prescriptions once ready from our pharmacy. PCP to follow up in upcoming weeks.

## 2025-07-11 NOTE — PROGRESS NOTES
[facility]   Pharmacy Pharmacokinetic Monitoring Service - Vancomycin    Indication: SSTI  Target Concentration: Goal AUC/FELIPE 400-600 mg*hr/L  Day of Therapy: 2  Additional Antimicrobials: cefazolin    Pertinent Laboratory Values:   Wt Readings from Last 1 Encounters:   07/11/25 100.1 kg (220 lb 11.2 oz)     Temp Readings from Last 1 Encounters:   07/11/25 97.6 °F (36.4 °C) (Oral)     Estimated Creatinine Clearance: 106 mL/min (based on SCr of 0.8 mg/dL).  Recent Labs     07/10/25  0330 07/11/25  0622   CREATININE 1.4* 0.8   BUN 21 13   WBC 9.1 7.5     Pertinent Cultures:  Date Source Results   7/10 BCx2 ngtd   MRSA Nasal Swab: N/A. Non-respiratory infection.    Recent vancomycin administrations                     vancomycin (VANCOCIN) 1,000 mg in sodium chloride 0.9 % 250 mL IVPB ([id number]) (mg) 1,000 mg New Bag 07/10/25 1111    vancomycin (VANCOCIN) 2500 mg in sodium chloride 0.9 % 500 mL IVPB (mg) 2,500 mg New Bag 07/09/25 2302             Assessment:  Date/Time Current Dose Concentration Timing of Concentration AUC C trough,ss   7/11  0622 On hold 5.7 ~ 19 hrs post dosed 339 9.1   Note: Serum concentrations collected for AUC dosing may appear elevated if collected in close proximity to the dose administered, this is not necessarily an indication of toxicity    Plan:  Restart vancomycin. Vanc 1500mg IV q12h  Repeat vancomycin concentration not ordered at this time  Pharmacy will monitor renal function and adjust therapy as indicated    Thank you for the consult,  Doretha Pederson, Pharm.D., BCPS, BCCCP 7/11/2025 9:29 AM

## 2025-07-11 NOTE — PROGRESS NOTES
[facility] Infectious Disease         Progress Note      Woodrow Diez  MEDICAL RECORD NUMBER:  [id number]  AGE: 69 y.o.   GENDER: male  : 1955  EPISODE DATE:  2025      Assessment:     Patient is a 69-year-old male who I am consulted for right upper extremity cellulitis.    This patient had prior evidence of erythema involving the right arm with significant improvement following initiation of IV antimicrobials.  He received therapy with vancomycin and cefazolin which I subsequently transition to monotherapy with vancomycin.  This is consistent with typical strep and staph infections.  This patient can be discharged on doxycycline 100 mg twice daily with tentative end of therapy .  This will complete 10 days of treatment.      Subjective:     Chief Complaint   Patient presents with    Arm Infection         No acute events overnight, no new complaints or concerns this morning.  Patient has had continued improvement of the right upper extremity cellulitis.      Patient Active Problem List   Diagnosis Code    Cervical spondylosis with myelopathy M47.12    COPD without exacerbation ([facility]) J44.9    Tobacco abuse Z72.0    Low back pain M54.50    Headache R51.9    Depression F32.A    Anxiety F41.9    Dizziness on standing R42    SOB (shortness of breath) on exertion R06.02    S/P cardiac cath 10/18/17-LM-P, LAD MID AND DIST 40%, LCX MID 40%, RCA MID 50%, QUJCMG82%, EDP 6 MMHG, EF 60% Z98.890    Dyslipidemia E78.5    Lumbar back pain with radiculopathy affecting left lower extremity M54.16    Spinal stenosis of lumbar region with neurogenic claudication M48.062    Abnormal EKG R94.31    Family history of early CAD Z82.49    History of chest pain a while back and did not complete the work up Z87.898    Noncompliance with medication regimen and F/u Z91.148    Acute respiratory failure ([facility]) J96.00    Postoperative respiratory failure J95.821    Acute blood loss anemia D62    Postlaminectomy

## 2025-07-11 NOTE — PLAN OF CARE
Problem: Discharge Planning  Goal: Discharge to home or other facility with appropriate resources  Outcome: Completed     Problem: Pain  Goal: Verbalizes/displays adequate comfort level or baseline comfort level  Outcome: Completed     Problem: Safety - Adult  Goal: Free from fall injury  Outcome: Completed     Problem: ABCDS Injury Assessment  Goal: Absence of physical injury  Outcome: Completed     Problem: Neurosensory - Adult  Goal: Achieves stable or improved neurological status  Outcome: Completed     Problem: Skin/Tissue Integrity - Adult  Goal: Skin integrity remains intact  Outcome: Completed     Problem: Musculoskeletal - Adult  Goal: Return mobility to safest level of function  Outcome: Completed     Problem: Infection - Adult  Goal: Absence of infection at discharge  Outcome: Completed

## 2025-07-11 NOTE — DISCHARGE SUMMARY
Hospital Medicine Discharge Summary      Patient Identification:   Woodrow Diez   : 1955  MRN: [id number]   Account: [id number]      Patient's PCP: No primary care provider on file.    Admit Date: 2025     Discharge Date:   2025    Admitting Physician: Jun Tan MD     Discharge Physician: Jeff Garcia DO     Discharge Diagnoses:  Right arm cellulitis: Failed outpatient treatment.  Presented with right arm swelling, redness.  Presented to ED , given Keflex.  XR right elbow moderate soft tissue swelling adjacent to elbow predominantly along the proximal ulna, no osseous abnormality.  CRP 3.50, ESR 21, WBC 12.7.  Blood cultures x 2 collected.  S/p cefazolin and vancomycin. Multimodal pain management initiated.  Consult ID, transition to doxycycline with duration of 10 days.  ANGELLA, resolved: Likely prerenal etiology in setting of decreased p.o. oral intake with possible concomitant drug injury.  Baseline CR 0.9.  Escalation to 1.4, BUN 21 with BUN:Cr <20. S/p IVF.   HTN: Per chart review.  No home antihypertensives.  Monitor BP.  HLD: Continue Lipitor 40 mg p.o. daily  COPD: no formal PFTs.  Not on home O2.  Endorses smoking, see below.  Continue home albuterol.  Advised outpatient pulmonology follow-up for formal PFTs and up titration of inhalers as appropriate.  Chronic back pain: Continue home gabapentin 800 mg p.o. 4 times daily  Tobacco use disorder: Endorses smoking 0.5 PPD for 24 PPY.  Counseled on smoking cessation.  Nicotine patch offered.    The patient was seen and examined on day of discharge and this discharge summary is in conjunction with any daily progress note from day of discharge.    Hospital Course:   The patient is a 69-year-old male with a PMH of HTN, HLD, COPD, chronic back pain, and use disorder presents [facility] ED for complaints of arm pain. The patient reports acute onset of pain in the right elbow that started . He works at a [employer] and [employer]. He

## 2025-07-14 LAB
BACTERIA BLD AEROBE CULT: NORMAL
BACTERIA BLD AEROBE CULT: NORMAL

## (undated) DEVICE — 3M™ WARMING BLANKET, UPPER BODY, 10 PER CASE, 42268: Brand: BAIR HUGGER™

## (undated) DEVICE — 3M™ STERI-STRIP™ COMPOUND BENZOIN TINCTURE 40 BAGS/CARTON 4 CARTONS/CASE C1544: Brand: 3M™ STERI-STRIP™

## (undated) DEVICE — 3M™ STERI-DRAPE™ INSTRUMENT POUCH 1018: Brand: STERI-DRAPE™

## (undated) DEVICE — PACK PROCEDURE SURG SET UP SRMC

## (undated) DEVICE — SUTURE VCRL SZ 0 L45CM ABSRB VLT OS-6 L36.4MM 1/2 CIR REV J711T

## (undated) DEVICE — TTL1LYR 16FR10ML 100%SIL TMPST TR: Brand: MEDLINE

## (undated) DEVICE — NEEDLE HYPO 18GA L1.5IN THN WALL PIVOTING SHLD BVL ORIENTED

## (undated) DEVICE — PENCIL SMK EVAC ALL IN 1 DSGN ENH VISIBILITY IMPROVED AIR

## (undated) DEVICE — SURGIFOAM SPNG SZ 12-7

## (undated) DEVICE — TUBING, SUCTION, 1/4" X 20', STRAIGHT: Brand: MEDLINE INDUSTRIES, INC.

## (undated) DEVICE — BLADE CLIPPER GEN PURP NS

## (undated) DEVICE — BAND RUBBER ST

## (undated) DEVICE — NEEDLE SPNL 22GA L3.5IN BLK HUB S STL REG WALL FIT STYL W/

## (undated) DEVICE — 6 ML SYRINGE LUER-LOCK TIP: Brand: MONOJECT

## (undated) DEVICE — CATHETER IV 14GA L1.75IN OD2.146MM ID1.740MM ORNG VIALON

## (undated) DEVICE — WAX SURG 2.5GM HEMSTAT BNE BEESWAX PARAFFIN ISO PALMITATE

## (undated) DEVICE — CARBIDE MATCH HEAD

## (undated) DEVICE — NEEDLE SPNL L3.5IN PNK HUB S STL REG WALL FIT STYL W/ QNCKE

## (undated) DEVICE — SUTURE NONABSORBABLE MONOFILAMENT 5-0 C-1 1X24 IN PROLENE 8725H

## (undated) DEVICE — GAUZE SPONGES,USP TYPE VII GAUZE, 12 PLY: Brand: CURITY

## (undated) DEVICE — SET DRN PVC DBL RND W/ TRCR 1/8 IN MID PERF 12 H PAT

## (undated) DEVICE — DRAIN SURG 10FR PVC TB W/ TRCR MID PERF NO RESVR HUBLESS

## (undated) DEVICE — SOLUTION IV 500ML 0.9% SOD CHL PH 5 INJ USP VIAFLX PLAS

## (undated) DEVICE — EVACUATOR SURG 400CC PVC 3 SPR BLB FOR WND DRNGE

## (undated) DEVICE — ULTRACLEAN ACCESSORY ELECTRODE 6" (15.24 CM) COATED BLADE: Brand: ULTRACLEAN

## (undated) DEVICE — BAG,BANDED,W/RUBBERBAND,STERILE,30X36: Brand: MEDLINE

## (undated) DEVICE — SPONGE LAP W18XL18IN WHT COT 4 PLY FLD STRUNG RADPQ DISP ST

## (undated) DEVICE — Device

## (undated) DEVICE — APPLICATOR MEDICATED 10.5 CC SOLUTION CLR STRL CHLORAPREP

## (undated) DEVICE — BANDAGE,GAUZE,4.5"X4.1YD,STERILE,LF: Brand: MEDLINE

## (undated) DEVICE — Z CONVERTED USE 2715898 SWABSTICK MEDICATED W1.75XL6.5IN 1.6ML 3.15% CHG 70% ISO

## (undated) DEVICE — DRAPE,INSTRUMENT,MAGNETIC,10X16: Brand: MEDLINE

## (undated) DEVICE — SURGIFOAM SPNG SZ 100

## (undated) DEVICE — ROYAL SILK SURGICAL GOWN, XXL: Brand: CONVERTORS

## (undated) DEVICE — DRESSING GRMCDL 6 12FR D1N CNTR HOLE 4MM ANTMCRBL PRTCTVE DI

## (undated) DEVICE — SUTURE VCRL SZ 1 L18IN ABSRB VLT CTX L48MM 1/2 CIR J765D

## (undated) DEVICE — PROBE STIM 3 MM FOR PEDCL SCREW DISP

## (undated) DEVICE — SUTURE NRLN SZ 4-0 L18IN NONABSORBABLE BLK L13MM TF 1/2 CIR C584D

## (undated) DEVICE — CELL SAVER BOWL

## (undated) DEVICE — 3M™ BAIR HUGGER® MULTI ACCESS BLANKET, PEDIATRIC, FULL BODY, 10 PER CASE 31000: Brand: BAIR HUGGER™

## (undated) DEVICE — 450 ML BOTTLE OF 0.05% CHLORHEXIDINE GLUCONATE IN 99.95% STERILE WATER FOR IRRIGATION, USP AND APPLICATOR.: Brand: IRRISEPT ANTIMICROBIAL WOUND LAVAGE

## (undated) DEVICE — 1010 S-DRAPE TOWEL DRAPE 10/BX: Brand: STERI-DRAPE™

## (undated) DEVICE — PACK,UNIVERSAL,NO GOWNS: Brand: MEDLINE

## (undated) DEVICE — TAPE SURG W4INXL11YD 2IN PERF LINERLESS NONWOVEN MEDFIX EZ

## (undated) DEVICE — TRAY CATH 16FR F INCLUDE LUB DRNGE BG STATLOK STBL DEV

## (undated) DEVICE — GOWN,SIRUS,NON REINFRCD,LARGE,SET IN SL: Brand: MEDLINE

## (undated) DEVICE — CELL SAVER TUBING

## (undated) DEVICE — DIFFUSER: Brand: CORE, MAESTRO

## (undated) DEVICE — KAIRISON TUBING SET PNEUMATIC, (3000 MM), STERILE, DISPOSABLE, TO BE USED WITH: FK898R, PACKAGE OF 10 PIECES: Brand: KAIRISON

## (undated) DEVICE — SYRINGE MED 10ML LUERLOCK TIP W/O SFTY DISP

## (undated) DEVICE — SUTURE VCRL SZ 2-0 L27IN ABSRB UD L36MM CP-1 1/2 CIR REV J266H

## (undated) DEVICE — 7" HEIGHT PRONE HEADREST. WITH COMFORT FOAM AND RIGHT SIDE INTUBATION SLOT: Brand: SOULE MEDICAL

## (undated) DEVICE — MICRO TIP WIPE: Brand: DEVON

## (undated) DEVICE — GLOVE BIOGEL POWDER FREE SZ 8

## (undated) DEVICE — THE MILL DISPOSABLE - MEDIUM

## (undated) DEVICE — CODMAN® SURGICAL PATTIES 1/2" X1 1/2" (1.27CM X 3.81CM): Brand: CODMAN®

## (undated) DEVICE — GLOVE ORANGE PI 8 1/2   MSG9085

## (undated) DEVICE — GLOVE SURG SZ 9 THK91MIL LTX FREE SYN POLYISOPRENE ANTI

## (undated) DEVICE — CANISTER, RIGID, 2000CC: Brand: MEDLINE INDUSTRIES, INC.

## (undated) DEVICE — SOLUTION IV 1000ML 0.9% SOD CHL PH 5 INJ USP VIAFLX PLAS

## (undated) DEVICE — HYPODERMIC SAFETY NEEDLE: Brand: MAGELLAN

## (undated) DEVICE — SET CATH 20GA L1.75IN RAD ART POLYUR RADPQ W/ INTEGR

## (undated) DEVICE — TUBING PRSS 36 M F

## (undated) DEVICE — GAUZE,SPONGE,8"X4",12PLY,XRAY,STRL,LF: Brand: MEDLINE

## (undated) DEVICE — 3M™ TEGADERM™ TRANSPARENT FILM DRESSING FRAME STYLE, 1626W, 4 IN X 4-3/4 IN (10 CM X 12 CM), 50/CT 4CT/CASE: Brand: 3M™ TEGADERM™

## (undated) DEVICE — GLOVE ORANGE PI 8   MSG9080

## (undated) DEVICE — GLOVE SURG SZ 65 THK91MIL LTX FREE SYN POLYISOPRENE

## (undated) DEVICE — AGENT HEMSTAT W2XL4IN OXIDIZED REGENERATED CELOS ABSRB SFT

## (undated) DEVICE — BIPOLAR SEALER 23-112-1 AQM 6.0: Brand: AQUAMANTYS ®

## (undated) DEVICE — SET AUTOTRNS C175ML BOWL BTM OUTLT RESERVOIRXTRA

## (undated) DEVICE — DRAPE: MAGNETIC 12X16 30/CS: Brand: MEDICAL ACTION INDUSTRIES

## (undated) DEVICE — 1840 FOAM BLOCK NEEDLE COUNTER: Brand: DEVON

## (undated) DEVICE — BLADE ES L4IN INSUL EDGE

## (undated) DEVICE — CODMAN® SURGICAL PATTIES 1" X 1" (2.54CM X 2.54CM): Brand: CODMAN®

## (undated) DEVICE — TOOL MR8-14BA60 MR8 14CM BALL 6MM: Brand: MIDAS REX MR8

## (undated) DEVICE — 35 ML SYRINGE LUER-LOCK TIP: Brand: MONOJECT

## (undated) DEVICE — CORD,CAUTERY,BIPOLAR,STERILE: Brand: MEDLINE

## (undated) DEVICE — SET CATH 20GA L1.5IN RAD ART POLYUR RADPQ W/ INTEGR 0.018IN

## (undated) DEVICE — TOWEL,OR,DSP,ST,BLUE,STD,4/PK,20PK/CS: Brand: MEDLINE

## (undated) DEVICE — MARKER,SKIN,WI/RULER AND LABELS: Brand: MEDLINE

## (undated) DEVICE — SUTURE ETHLN SZ 3-0 L18IN NONABSORBABLE BLK FS-1 L24MM 3/8 663H

## (undated) DEVICE — HEAD POSITIONER, SURGICAL: Brand: DEROYAL

## (undated) DEVICE — SYRINGE IRRIG 60ML SFT PLIABLE BLB EZ TO GRP 1 HND USE W/

## (undated) DEVICE — SOLUTION SURG PREP POV IOD 7.5% 4 OZ

## (undated) DEVICE — SPLINT ARMBRD W3XL10.5IN POLYFOAM DLX A LN

## (undated) DEVICE — GOWN,SIRUS,NONRNF,SETINSLV,XL,20/CS: Brand: MEDLINE

## (undated) DEVICE — PACK-MAJOR

## (undated) DEVICE — SUTURE MCRYL SZ 3-0 L27IN ABSRB UD L24MM PS-1 3/8 CIR PRIM Y936H

## (undated) DEVICE — CODMAN® SURGICAL PATTIES 1/2" X 1/2" (1.27CM X 1.27CM): Brand: CODMAN®

## (undated) DEVICE — GLOVE ORANGE PI 7 1/2   MSG9075

## (undated) DEVICE — GLOVE SURG SZ 85 L12IN THK75MIL DK GRN LTX FREE

## (undated) DEVICE — OIL CARTRIDGE: Brand: CORE, MAESTRO

## (undated) DEVICE — SUTURE ABSRB BRAID COAT UD CP NO 2 27IN VCRL J195H

## (undated) DEVICE — YANKAUER,BULB TIP,W/O VENT,RIGID,STERILE: Brand: MEDLINE

## (undated) DEVICE — SUTURE VCRL SZ 2-0 L18IN ABSRB VLT L26MM SH 1/2 CIR J775D

## (undated) DEVICE — C-ARMOR C-ARM EQUIPMENT COVERS CLEAR STERILE UNIVERSAL FIT 12 PER CASE: Brand: C-ARMOR

## (undated) DEVICE — SUTURE PERMA-HAND SZ 3-0 L30IN NONABSORBABLE BLK L60MM KS 622H

## (undated) DEVICE — 500ML,PRESSURE INFUSER W/STOPCOCK: Brand: MEDLINE

## (undated) DEVICE — AGENT HEMOSTATIC SURGIFLOW MATRIX KIT W/THROMBIN

## (undated) DEVICE — DISPOSABLE STANDARD BIPOLAR FORCEPS, NON-STICK,: Brand: SPETZLER-MALIS

## (undated) DEVICE — 4-PORT MANIFOLD: Brand: NEPTUNE 2

## (undated) DEVICE — BLADE LARYNSCP SZ 3 ENH DIR INTUB GLIDESCOPE MCGRATH MAC

## (undated) DEVICE — KIT EVAC 400CC PVC RADPQ Y CONN

## (undated) DEVICE — STRIP,CLOSURE,WOUND,MEDI-STRIP,1/2X4: Brand: MEDLINE

## (undated) DEVICE — LINE ASPIR 1/4 ANTICOAG

## (undated) DEVICE — 3M™ IOBAN™ 2 ANTIMICROBIAL INCISE DRAPE 6650EZ: Brand: IOBAN™ 2

## (undated) DEVICE — BUR RND FLUT AGRSV 5MM

## (undated) DEVICE — SYRINGE MED 3ML CLR PLAS STD N CTRL LUERLOCK TIP DISP

## (undated) DEVICE — BASIC SINGLE BASIN BTC-LF: Brand: MEDLINE INDUSTRIES, INC.

## (undated) DEVICE — SPONGE GZ W4XL4IN COT 12 PLY TYP VII WVN C FLD DSGN

## (undated) DEVICE — PREP SOL PVP IODINE 4%  4 OZ/BTL

## (undated) DEVICE — PRESSURE MONITORING SET: Brand: TRUWAVE